# Patient Record
Sex: FEMALE | Race: WHITE | Employment: OTHER | URBAN - METROPOLITAN AREA
[De-identification: names, ages, dates, MRNs, and addresses within clinical notes are randomized per-mention and may not be internally consistent; named-entity substitution may affect disease eponyms.]

---

## 2017-01-02 ENCOUNTER — PATIENT OUTREACH (OUTPATIENT)
Dept: CASE MANAGEMENT | Age: 82
End: 2017-01-02

## 2017-01-02 NOTE — PROGRESS NOTES
Patient identified with a potential need for Chronic Care Management services. Called patient to introduce self and availability of Chronic Care Management services.   Patient informed about the following service elements:  · Health information sharing - c

## 2017-02-06 ENCOUNTER — TELEPHONE (OUTPATIENT)
Dept: FAMILY MEDICINE CLINIC | Facility: CLINIC | Age: 82
End: 2017-02-06

## 2017-02-06 DIAGNOSIS — E88.81 METABOLIC SYNDROME: ICD-10-CM

## 2017-02-06 DIAGNOSIS — R73.03 PREDIABETES: Primary | ICD-10-CM

## 2017-02-06 DIAGNOSIS — I10 HYPERTENSION GOAL BP (BLOOD PRESSURE) < 140/90: ICD-10-CM

## 2017-02-06 DIAGNOSIS — R73.9 HYPERGLYCEMIA: ICD-10-CM

## 2017-02-06 DIAGNOSIS — E78.5 HYPERLIPIDEMIA LDL GOAL <100: ICD-10-CM

## 2017-02-06 NOTE — TELEPHONE ENCOUNTER
Patient is requesting labs be placed at THE [facility], has upcoming appt on 3/29/17 with Dr. Brigitte Batista

## 2017-03-28 ENCOUNTER — TELEPHONE (OUTPATIENT)
Dept: FAMILY MEDICINE CLINIC | Facility: CLINIC | Age: 82
End: 2017-03-28

## 2017-03-28 DIAGNOSIS — Z87.2 HX OF ACTINIC KERATOSIS: Primary | ICD-10-CM

## 2017-03-28 RX ORDER — HYDROCODONE BITARTRATE AND ACETAMINOPHEN 5; 325 MG/1; MG/1
TABLET ORAL
Refills: 0 | COMMUNITY
Start: 2017-03-20 | End: 2017-11-01 | Stop reason: ALTCHOICE

## 2017-03-28 RX ORDER — CLINDAMYCIN HYDROCHLORIDE 150 MG/1
CAPSULE ORAL
Refills: 1 | COMMUNITY
Start: 2017-03-20 | End: 2017-11-01 | Stop reason: ALTCHOICE

## 2017-03-28 RX ORDER — CHLORHEXIDINE GLUCONATE 0.12 MG/ML
RINSE ORAL
Refills: 0 | COMMUNITY
Start: 2017-03-20 | End: 2017-10-02 | Stop reason: ALTCHOICE

## 2017-03-28 NOTE — TELEPHONE ENCOUNTER
Referral Request   Received:  Today       Renan [facility] Clinical Staff Cc: [facility] Referral Pool       Phone Number: [phone]                     .Reason for the order/referral: OV   PCP:  Dr Irwin Art   Refer to Provider (first and last name

## 2017-03-28 NOTE — TELEPHONE ENCOUNTER
Called and talked to [address] Meera Santos Their insurance changed and Dr Juliana Steele is no longer in network but Dr Cristy Mays is in their network

## 2017-03-28 NOTE — TELEPHONE ENCOUNTER
Patient has previously see Dr Gianfranco Harden, why are they requesting to see a Derm from [facility]? Not sure if he is in network for HMO?   Please call patient and check

## 2017-03-29 PROBLEM — Z87.2 HX OF ACTINIC KERATOSIS: Status: ACTIVE | Noted: 2017-03-29

## 2017-04-05 ENCOUNTER — CHARTING TRANS (OUTPATIENT)
Dept: OTHER | Age: 82
End: 2017-04-05

## 2017-04-27 ENCOUNTER — TELEPHONE (OUTPATIENT)
Dept: FAMILY MEDICINE CLINIC | Facility: CLINIC | Age: 82
End: 2017-04-27

## 2017-05-01 ENCOUNTER — APPOINTMENT (OUTPATIENT)
Dept: LAB | Age: 82
End: 2017-05-01
Attending: FAMILY MEDICINE
Payer: MEDICARE

## 2017-05-01 DIAGNOSIS — R73.03 PREDIABETES: ICD-10-CM

## 2017-05-01 DIAGNOSIS — E78.5 HYPERLIPIDEMIA LDL GOAL <100: ICD-10-CM

## 2017-05-01 DIAGNOSIS — E88.81 METABOLIC SYNDROME: ICD-10-CM

## 2017-05-01 DIAGNOSIS — I10 HYPERTENSION GOAL BP (BLOOD PRESSURE) < 140/90: ICD-10-CM

## 2017-05-01 DIAGNOSIS — R73.9 HYPERGLYCEMIA: ICD-10-CM

## 2017-05-01 PROCEDURE — 36415 COLL VENOUS BLD VENIPUNCTURE: CPT

## 2017-05-01 PROCEDURE — 80061 LIPID PANEL: CPT

## 2017-05-01 PROCEDURE — 80053 COMPREHEN METABOLIC PANEL: CPT

## 2017-05-01 PROCEDURE — 83036 HEMOGLOBIN GLYCOSYLATED A1C: CPT

## 2017-05-02 ENCOUNTER — MA CHART PREP (OUTPATIENT)
Dept: FAMILY MEDICINE CLINIC | Facility: CLINIC | Age: 82
End: 2017-05-02

## 2017-05-02 PROBLEM — M47.819 SPINAL ARTHRITIS: Status: ACTIVE | Noted: 2017-05-02

## 2017-05-02 PROBLEM — N18.30 CHRONIC KIDNEY DISEASE, STAGE 3 (HCC): Status: ACTIVE | Noted: 2017-05-02

## 2017-05-02 PROBLEM — I70.0 AORTIC ATHEROSCLEROSIS (HCC): Status: ACTIVE | Noted: 2017-05-02

## 2017-05-02 PROBLEM — I70.0 AORTIC ATHEROSCLEROSIS: Status: ACTIVE | Noted: 2017-05-02

## 2017-05-03 ENCOUNTER — OFFICE VISIT (OUTPATIENT)
Dept: FAMILY MEDICINE CLINIC | Facility: CLINIC | Age: 82
End: 2017-05-03

## 2017-05-03 VITALS
WEIGHT: 161 LBS | HEIGHT: 62.5 IN | RESPIRATION RATE: 12 BRPM | SYSTOLIC BLOOD PRESSURE: 118 MMHG | BODY MASS INDEX: 28.89 KG/M2 | DIASTOLIC BLOOD PRESSURE: 80 MMHG | TEMPERATURE: 97 F | HEART RATE: 66 BPM

## 2017-05-03 DIAGNOSIS — Z00.00 ANNUAL PHYSICAL EXAM: Primary | ICD-10-CM

## 2017-05-03 DIAGNOSIS — I70.0 AORTIC ATHEROSCLEROSIS (HCC): ICD-10-CM

## 2017-05-03 DIAGNOSIS — Z00.00 ENCOUNTER FOR ANNUAL HEALTH EXAMINATION: ICD-10-CM

## 2017-05-03 DIAGNOSIS — E78.5 HYPERLIPIDEMIA LDL GOAL <100: ICD-10-CM

## 2017-05-03 DIAGNOSIS — Z87.2 HX OF ACTINIC KERATOSIS: ICD-10-CM

## 2017-05-03 DIAGNOSIS — R73.03 PREDIABETES: ICD-10-CM

## 2017-05-03 DIAGNOSIS — I10 HYPERTENSION GOAL BP (BLOOD PRESSURE) < 140/90: ICD-10-CM

## 2017-05-03 DIAGNOSIS — Z78.0 POSTMENOPAUSAL: ICD-10-CM

## 2017-05-03 DIAGNOSIS — M47.819 SPINAL ARTHRITIS: ICD-10-CM

## 2017-05-03 DIAGNOSIS — N39.41 URGE INCONTINENCE OF URINE: ICD-10-CM

## 2017-05-03 DIAGNOSIS — N18.30 CHRONIC KIDNEY DISEASE, STAGE 3 (HCC): ICD-10-CM

## 2017-05-03 PROCEDURE — 96160 PT-FOCUSED HLTH RISK ASSMT: CPT | Performed by: FAMILY MEDICINE

## 2017-05-03 PROCEDURE — G0439 PPPS, SUBSEQ VISIT: HCPCS | Performed by: FAMILY MEDICINE

## 2017-05-03 PROCEDURE — 99397 PER PM REEVAL EST PAT 65+ YR: CPT | Performed by: FAMILY MEDICINE

## 2017-05-03 NOTE — PATIENT INSTRUCTIONS
Capsaicin topical cream (Chili peppers) or aspicream    Clary Quinones's SCREENING SCHEDULE   Tests on this list are recommended by your physician but may not be covered, or covered at this frequency, by your insurer.  Please check with your insurance c following criteria:   • Men who are 73-68 years old and have smoked more than 100 cigarettes in their lifetime   • Anyone with a family history    Colorectal Cancer Screening  Covered up to Age 76     Colonoscopy Screen   Covered every 10 years- more often or performed in visit on 09/23/16  -FLU VACC PRSV FREE INC ANTIG    Please get every year    Pneumococcal 13 (Prevnar)  Covered Once after 65 No orders found for this or any previous visit.  Please get once after your 65th birthday    Pneumococcal 23 (Pneum

## 2017-05-03 NOTE — PROGRESS NOTES
HPI:   Charlotte Swanson is a 80year old female who presents for a MA (Medicare Advantage) Supervisit (Once per calendar year).     Doing well, due for dexa, still with back pain, no change with voltaren  Annual Physical due on 09/23/2017        Patient C Transdermal Gel Apply 4 g topically daily as needed. Cholecalciferol (VITAMIN D) 2000 UNITS Oral Cap Take 1 tablet by mouth daily. Aspirin 81 MG Oral Chew Tab Chew 81 mg by mouth daily.    Calcium Carbonate-Vit D-Min (CALCIUM 1200 OR) Take 1 tablet by m the following:    Height as of this encounter: 62.5\". Weight as of this encounter: 161 lb.     Medicare Hearing Assessment  (Required for AWV/SWV)    Hearing Screening    Time taken:  5/3/2017  1:56 PM   Entry User:  Prabha Arn   Screening Method: Normal range of motion. Lymphadenopathy:     She has no cervical adenopathy. She has no axillary adenopathy. Neurological: She is alert and oriented to person, place, and time. She has normal strength and normal reflexes.  No cranial nerve deficit o LOVASTATIN 20 MG Oral Tab                    Musculoskeletal    Spinal arthritis ([facility] [address]    Overview     Seen on multiple Xr spine,  Mild sacroiliac arthritis, worse on the right- T#3 or Norco 5 prn         Current Assessment & Plan     Daily sx, PT in past, in [facility]:    [address] [place] [address] does not have a Power of  for [employer] on file in [facility]  The patient has this document but we do not have it in [facility], and patient is instructed to get our office a copy of it for scanning into Epic          PLAN:  T hazards?: 0-No    Are you on multiple medications?: 0-No    Does pain affect your day to day activities?: 0-No     Have you had any memory issues?: 0-No    Fall/Risk Scorin    Scoring Interpretation: 0 - 3 No Risk     Depression Screening (PHQ-2/PHQ-9) Blood Annually No results found for: FOB No flowsheet data found. Glaucoma Screening      Ophthalmology Visit Annually: Diabetics, FHx Glaucoma, AA>50, > 65 No flowsheet data found.     Bone Density Screening      Dexascan Every two years No resu 05/01/2014 5.9*       No flowsheet data found. Creat/alb ratio  Annually      LDL  Annually LDL CHOLESTEROL (mg/dL)   Date Value   05/01/2017 108     LDL CHOLESTROL (mg/dL)   Date Value   05/01/2014 69    No flowsheet data found.      Dilated Eye exam

## 2017-05-13 DIAGNOSIS — E78.5 HYPERLIPIDEMIA LDL GOAL <100: Primary | ICD-10-CM

## 2017-05-13 RX ORDER — LOVASTATIN 20 MG/1
TABLET ORAL
Qty: 90 TABLET | Refills: 1 | Status: SHIPPED | OUTPATIENT
Start: 2017-05-13 | End: 2017-11-01

## 2017-05-13 NOTE — TELEPHONE ENCOUNTER
Rx request for med Lovastatin 20 mg from [facility]. Last refilled on 11/14/16 # 90 with 1 refill. LOV 5/3/17 with Dr. Brii Leach for medicare supervisit and hyperlipidemia. Last Lipid and CMP done on 5/1/17, Alt = 23.     Future Appointments  Date Time P

## 2017-05-15 ENCOUNTER — APPOINTMENT (OUTPATIENT)
Dept: LAB | Age: 82
End: 2017-05-15
Attending: FAMILY MEDICINE
Payer: MEDICARE

## 2017-05-15 ENCOUNTER — TELEPHONE (OUTPATIENT)
Dept: FAMILY MEDICINE CLINIC | Facility: CLINIC | Age: 82
End: 2017-05-15

## 2017-05-15 DIAGNOSIS — R10.9 RIGHT FLANK PAIN: Primary | ICD-10-CM

## 2017-05-15 DIAGNOSIS — R10.9 RIGHT FLANK PAIN: ICD-10-CM

## 2017-05-15 PROCEDURE — 87086 URINE CULTURE/COLONY COUNT: CPT

## 2017-05-15 NOTE — TELEPHONE ENCOUNTER
Called [facility] to call back has had this it was discussed at last office visit now lower on Rt hand side radiating up back higher than waist awoke during night with this worried about UTI kidney having some frequency has been drinking cranberry juice so no bu

## 2017-05-16 ENCOUNTER — LAB ENCOUNTER (OUTPATIENT)
Dept: LAB | Age: 82
End: 2017-05-16
Attending: FAMILY MEDICINE
Payer: MEDICARE

## 2017-05-16 ENCOUNTER — TELEPHONE (OUTPATIENT)
Dept: FAMILY MEDICINE CLINIC | Facility: CLINIC | Age: 82
End: 2017-05-16

## 2017-05-16 DIAGNOSIS — R10.9 RT FLANK PAIN: Primary | ICD-10-CM

## 2017-05-16 DIAGNOSIS — R10.9 RT FLANK PAIN: ICD-10-CM

## 2017-05-16 PROCEDURE — 87086 URINE CULTURE/COLONY COUNT: CPT

## 2017-05-16 PROCEDURE — 81001 URINALYSIS AUTO W/SCOPE: CPT

## 2017-05-16 NOTE — TELEPHONE ENCOUNTER
Called Lab. Found out that Urine Specimen leaked all out into the bag. They need a new specimen. Called Pt and advised. Pt will go back to complete.

## 2017-05-16 NOTE — TELEPHONE ENCOUNTER
Eddie Lorenz is calling from THE [facility] lab to speak to nurse or Dr. Brad Duran to let either one know why appointment was cancelled.

## 2017-05-18 ENCOUNTER — HOSPITAL ENCOUNTER (OUTPATIENT)
Dept: BONE DENSITY | Age: 82
Discharge: HOME | End: 2017-05-18
Attending: FAMILY MEDICINE
Payer: MEDICARE

## 2017-05-18 DIAGNOSIS — Z78.0 POSTMENOPAUSAL: ICD-10-CM

## 2017-05-18 PROCEDURE — 77080 DXA BONE DENSITY AXIAL: CPT | Performed by: FAMILY MEDICINE

## 2017-05-18 NOTE — PROGRESS NOTES
Quick Note:    Your bone density has improved. Continue your current treatment measures and repeat the Bone Density Dexa Scan in 2-3 years.   ______

## 2017-09-20 DIAGNOSIS — M19.90 ARTHRITIS: ICD-10-CM

## 2017-09-20 NOTE — TELEPHONE ENCOUNTER
Pt requesting refill on Voltaren. LOV 05/03/17 and labs 05/01/17. Will you approve pended refill ? Routed to Dr Nadja Rowell.

## 2017-09-20 NOTE — TELEPHONE ENCOUNTER
Patient calling for a refill of Diclofenac Sodium (VOLTAREN) 1 % Transdermal Gel, would like sent to [place]

## 2017-10-02 ENCOUNTER — OFFICE VISIT (OUTPATIENT)
Dept: FAMILY MEDICINE CLINIC | Facility: CLINIC | Age: 82
End: 2017-10-02

## 2017-10-02 VITALS
HEART RATE: 88 BPM | WEIGHT: 163.81 LBS | RESPIRATION RATE: 16 BRPM | HEIGHT: 63.5 IN | BODY MASS INDEX: 28.66 KG/M2 | SYSTOLIC BLOOD PRESSURE: 148 MMHG | TEMPERATURE: 99 F | DIASTOLIC BLOOD PRESSURE: 78 MMHG

## 2017-10-02 DIAGNOSIS — R35.0 FREQUENCY OF URINATION: Primary | ICD-10-CM

## 2017-10-02 DIAGNOSIS — N30.00 ACUTE CYSTITIS WITHOUT HEMATURIA: ICD-10-CM

## 2017-10-02 PROCEDURE — 87186 SC STD MICRODIL/AGAR DIL: CPT | Performed by: PHYSICIAN ASSISTANT

## 2017-10-02 PROCEDURE — 87088 URINE BACTERIA CULTURE: CPT | Performed by: PHYSICIAN ASSISTANT

## 2017-10-02 PROCEDURE — 87086 URINE CULTURE/COLONY COUNT: CPT | Performed by: PHYSICIAN ASSISTANT

## 2017-10-02 PROCEDURE — 99213 OFFICE O/P EST LOW 20 MIN: CPT | Performed by: PHYSICIAN ASSISTANT

## 2017-10-02 PROCEDURE — 81003 URINALYSIS AUTO W/O SCOPE: CPT | Performed by: PHYSICIAN ASSISTANT

## 2017-10-02 RX ORDER — NITROFURANTOIN 25; 75 MG/1; MG/1
100 CAPSULE ORAL 2 TIMES DAILY
Qty: 14 CAPSULE | Refills: 0 | Status: SHIPPED | OUTPATIENT
Start: 2017-10-02 | End: 2017-10-19 | Stop reason: ALTCHOICE

## 2017-10-02 NOTE — PROGRESS NOTES
CC:  Patient presents with:  UTI: Has been experiencing frequency beginning yesterday. No c/o pain      HPI: Gisell Rashid presents with complaints of increased urinary frequency. Symptoms have been present for 2 days.  She denies nausea, vomiting, abdominal candelaria TS PO Q 4 TO 6 H PRN P Disp:  Rfl: 0   [DISCONTINUED] oxybutynin 5 MG Oral Tablet SR 24 Hr Take 1 Tab by mouth daily. Disp: 90 Tab Rfl: 3     No current facility-administered medications on file prior to visit.      Review of Systems:     Constitutional: No verbalizes understanding. Patient is notified to call with any questions, complications, allergies, or worsening or changing symptoms. Patient is to call with any side effects or complications from the treatments as a result of today.     Reviewed Past Med

## 2017-10-19 ENCOUNTER — OFFICE VISIT (OUTPATIENT)
Dept: FAMILY MEDICINE CLINIC | Facility: CLINIC | Age: 82
End: 2017-10-19

## 2017-10-19 VITALS
DIASTOLIC BLOOD PRESSURE: 60 MMHG | BODY MASS INDEX: 29.07 KG/M2 | HEIGHT: 62.25 IN | RESPIRATION RATE: 10 BRPM | HEART RATE: 60 BPM | SYSTOLIC BLOOD PRESSURE: 116 MMHG | WEIGHT: 160 LBS

## 2017-10-19 DIAGNOSIS — N30.01 ACUTE CYSTITIS WITH HEMATURIA: Primary | ICD-10-CM

## 2017-10-19 DIAGNOSIS — N39.41 URGE INCONTINENCE OF URINE: ICD-10-CM

## 2017-10-19 PROCEDURE — 87186 SC STD MICRODIL/AGAR DIL: CPT | Performed by: FAMILY MEDICINE

## 2017-10-19 PROCEDURE — 99213 OFFICE O/P EST LOW 20 MIN: CPT | Performed by: FAMILY MEDICINE

## 2017-10-19 PROCEDURE — 87088 URINE BACTERIA CULTURE: CPT | Performed by: FAMILY MEDICINE

## 2017-10-19 PROCEDURE — 81003 URINALYSIS AUTO W/O SCOPE: CPT | Performed by: FAMILY MEDICINE

## 2017-10-19 PROCEDURE — 87086 URINE CULTURE/COLONY COUNT: CPT | Performed by: FAMILY MEDICINE

## 2017-10-19 RX ORDER — CIPROFLOXACIN 250 MG/1
250 TABLET, FILM COATED ORAL 2 TIMES DAILY
Qty: 14 TABLET | Refills: 0 | Status: SHIPPED | OUTPATIENT
Start: 2017-10-19 | End: 2017-10-29

## 2017-10-19 NOTE — PROGRESS NOTES
Alek Helena is a 80year old female. HPI:   Patient here on 10/2 with UTI sx, cx showed e coli. Treated with macrobid (culture sensitive). Her sx did improved, but returned after a few days. Having frequency, incontinence.   No fever, chills or h

## 2017-10-26 ENCOUNTER — APPOINTMENT (OUTPATIENT)
Dept: LAB | Age: 82
End: 2017-10-26
Attending: FAMILY MEDICINE
Payer: MEDICARE

## 2017-10-26 DIAGNOSIS — N30.01 ACUTE CYSTITIS WITH HEMATURIA: ICD-10-CM

## 2017-10-26 PROCEDURE — 87086 URINE CULTURE/COLONY COUNT: CPT

## 2017-10-26 PROCEDURE — 36415 COLL VENOUS BLD VENIPUNCTURE: CPT

## 2017-11-01 ENCOUNTER — OFFICE VISIT (OUTPATIENT)
Dept: FAMILY MEDICINE CLINIC | Facility: CLINIC | Age: 82
End: 2017-11-01

## 2017-11-01 VITALS
HEART RATE: 64 BPM | TEMPERATURE: 97 F | BODY MASS INDEX: 29.43 KG/M2 | RESPIRATION RATE: 14 BRPM | WEIGHT: 164 LBS | DIASTOLIC BLOOD PRESSURE: 74 MMHG | SYSTOLIC BLOOD PRESSURE: 142 MMHG | HEIGHT: 62.75 IN

## 2017-11-01 DIAGNOSIS — E78.5 HYPERLIPIDEMIA LDL GOAL <100: ICD-10-CM

## 2017-11-01 DIAGNOSIS — I10 HYPERTENSION GOAL BP (BLOOD PRESSURE) < 140/90: ICD-10-CM

## 2017-11-01 DIAGNOSIS — M47.819 SPINAL ARTHRITIS: ICD-10-CM

## 2017-11-01 DIAGNOSIS — R73.03 PREDIABETES: Primary | ICD-10-CM

## 2017-11-01 DIAGNOSIS — R73.9 HYPERGLYCEMIA: ICD-10-CM

## 2017-11-01 DIAGNOSIS — N18.30 CHRONIC KIDNEY DISEASE, STAGE 3 (HCC): ICD-10-CM

## 2017-11-01 DIAGNOSIS — I70.0 AORTIC ATHEROSCLEROSIS (HCC): ICD-10-CM

## 2017-11-01 PROCEDURE — 99214 OFFICE O/P EST MOD 30 MIN: CPT | Performed by: FAMILY MEDICINE

## 2017-11-01 RX ORDER — LOVASTATIN 20 MG/1
20 TABLET ORAL NIGHTLY
Qty: 90 TABLET | Refills: 3 | Status: SHIPPED | OUTPATIENT
Start: 2017-11-01 | End: 2018-10-28

## 2017-11-02 NOTE — ASSESSMENT & PLAN NOTE
As for her Pre-Diabetes, it is well controlled, no significant medication side effects noted. Recommendations are: continue present meds, lose weight by increased dietary compliance and exercise and will check labs as ordered.       Lab Results  Compone

## 2017-11-02 NOTE — ASSESSMENT & PLAN NOTE
Stable, Continue present management.     Cholesterol Lowering Medications          Lovastatin 20 MG Oral Tab

## 2017-11-02 NOTE — ASSESSMENT & PLAN NOTE
Continue to follow.      Lab Results  Component Value Date/Time   CREATSERUM 0.96 05/01/2017 08:13 AM   GFR 54 (L) 05/01/2017 08:13 AM   GFRNAA 64 05/01/2014 08:00 AM

## 2017-11-06 ENCOUNTER — LAB ENCOUNTER (OUTPATIENT)
Dept: LAB | Age: 82
End: 2017-11-06
Attending: FAMILY MEDICINE
Payer: MEDICARE

## 2017-11-06 DIAGNOSIS — R73.9 HYPERGLYCEMIA: ICD-10-CM

## 2017-11-06 DIAGNOSIS — N18.30 CHRONIC KIDNEY DISEASE, STAGE 3 (HCC): ICD-10-CM

## 2017-11-06 DIAGNOSIS — R73.03 PREDIABETES: ICD-10-CM

## 2017-11-06 PROCEDURE — 80053 COMPREHEN METABOLIC PANEL: CPT

## 2017-11-06 PROCEDURE — 36415 COLL VENOUS BLD VENIPUNCTURE: CPT

## 2017-11-06 PROCEDURE — 83036 HEMOGLOBIN GLYCOSYLATED A1C: CPT

## 2017-11-06 PROCEDURE — 80061 LIPID PANEL: CPT

## 2017-11-06 PROCEDURE — 85025 COMPLETE CBC W/AUTO DIFF WBC: CPT

## 2018-01-15 ENCOUNTER — TELEPHONE (OUTPATIENT)
Dept: FAMILY MEDICINE CLINIC | Facility: CLINIC | Age: 83
End: 2018-01-15

## 2018-01-15 NOTE — TELEPHONE ENCOUNTER
This is a request for the PA in Dr Elvira Valle office. This patient has an active referral valid through Nov 2018 for visit to his office.    Front Staff, Please call the patient and inform her that she has an active referral valid for 6 visit through Nov

## 2018-01-15 NOTE — TELEPHONE ENCOUNTER
[facility]  [facility] Clinical Staff Cc: [facility] Referral Pool   Phone Number: [phone]             .Reason for the order/referral:appt today at 1:40   PCP:  Dr Isaac Jimenez   Refer to Provider (first and last name):Fide Odom   Specialty: Doug

## 2018-01-18 ENCOUNTER — CHARTING TRANS (OUTPATIENT)
Dept: OTHER | Age: 83
End: 2018-01-18

## 2018-01-31 ENCOUNTER — TELEPHONE (OUTPATIENT)
Dept: FAMILY MEDICINE CLINIC | Facility: CLINIC | Age: 83
End: 2018-01-31

## 2018-01-31 DIAGNOSIS — M47.819 SPINAL ARTHRITIS: Primary | ICD-10-CM

## 2018-01-31 DIAGNOSIS — M41.9 SCOLIOSIS OF LUMBAR SPINE, UNSPECIFIED SCOLIOSIS TYPE: ICD-10-CM

## 2018-01-31 DIAGNOSIS — M48.062 SPINAL STENOSIS OF LUMBAR REGION WITH NEUROGENIC CLAUDICATION: ICD-10-CM

## 2018-01-31 NOTE — TELEPHONE ENCOUNTER
A representative from [address] [place] [address] Drive a pain specialist office calling and they are recommend her for shots but now they need a referral for the injections so they are looking for a referral for 6 visits that will cover appts and the injections.  Appt is Febr

## 2018-01-31 NOTE — TELEPHONE ENCOUNTER
Talked to Marquise Santos RN we need the CPT code for the office visit, injection sites (how many sites) and the medication called office and got CPT codes for Evaluate and treat [id number] and lumbar Epidural injection CPT 26853.

## 2018-02-03 NOTE — TELEPHONE ENCOUNTER
Seeing Sherif ramachandran [place], OV 1/31/18  MEDICAL DECISION MAKING:  Impression: Lumbar scoliosis  Lumbar stenosis with claudicatory low back pain  Plan: MRI results were reviewed. Pt is intact on exam. She will f/u with pain service for LESIs.  She is not inter

## 2018-02-07 PROBLEM — M51.369 DDD (DEGENERATIVE DISC DISEASE), LUMBAR: Status: ACTIVE | Noted: 2018-02-07

## 2018-02-07 PROBLEM — M48.062 SPINAL STENOSIS OF LUMBAR REGION WITH NEUROGENIC CLAUDICATION: Status: ACTIVE | Noted: 2018-02-07

## 2018-02-07 PROBLEM — M54.16 LUMBAR RADICULITIS: Status: ACTIVE | Noted: 2018-02-07

## 2018-02-07 PROBLEM — M47.816 LUMBAR SPONDYLOSIS: Status: ACTIVE | Noted: 2018-02-07

## 2018-02-07 PROBLEM — M41.9 SCOLIOSIS OF LUMBAR SPINE, UNSPECIFIED SCOLIOSIS TYPE: Status: ACTIVE | Noted: 2018-02-07

## 2018-02-07 PROBLEM — M51.36 DDD (DEGENERATIVE DISC DISEASE), LUMBAR: Status: ACTIVE | Noted: 2018-02-07

## 2018-04-26 ENCOUNTER — TELEPHONE (OUTPATIENT)
Dept: FAMILY MEDICINE CLINIC | Facility: CLINIC | Age: 83
End: 2018-04-26

## 2018-04-30 PROBLEM — M54.16 LUMBAR RADICULITIS: Status: RESOLVED | Noted: 2018-02-07 | Resolved: 2018-04-30

## 2018-04-30 PROBLEM — M41.9 SCOLIOSIS OF LUMBAR SPINE, UNSPECIFIED SCOLIOSIS TYPE: Status: RESOLVED | Noted: 2018-02-07 | Resolved: 2018-04-30

## 2018-04-30 PROBLEM — Z87.2 HX OF ACTINIC KERATOSIS: Status: RESOLVED | Noted: 2017-03-29 | Resolved: 2018-04-30

## 2018-04-30 PROBLEM — M51.36 DDD (DEGENERATIVE DISC DISEASE), LUMBAR: Status: RESOLVED | Noted: 2018-02-07 | Resolved: 2018-04-30

## 2018-04-30 PROBLEM — M47.816 LUMBAR SPONDYLOSIS: Status: RESOLVED | Noted: 2018-02-07 | Resolved: 2018-04-30

## 2018-04-30 PROBLEM — M51.369 DDD (DEGENERATIVE DISC DISEASE), LUMBAR: Status: RESOLVED | Noted: 2018-02-07 | Resolved: 2018-04-30

## 2018-05-02 ENCOUNTER — OFFICE VISIT (OUTPATIENT)
Dept: FAMILY MEDICINE CLINIC | Facility: CLINIC | Age: 83
End: 2018-05-02

## 2018-05-02 VITALS
HEART RATE: 72 BPM | HEIGHT: 63 IN | DIASTOLIC BLOOD PRESSURE: 80 MMHG | RESPIRATION RATE: 14 BRPM | TEMPERATURE: 98 F | WEIGHT: 161 LBS | SYSTOLIC BLOOD PRESSURE: 128 MMHG | BODY MASS INDEX: 28.53 KG/M2

## 2018-05-02 DIAGNOSIS — M47.819 SPINAL ARTHRITIS: ICD-10-CM

## 2018-05-02 DIAGNOSIS — I10 HYPERTENSION GOAL BP (BLOOD PRESSURE) < 140/90: ICD-10-CM

## 2018-05-02 DIAGNOSIS — E78.5 HYPERLIPIDEMIA LDL GOAL <100: ICD-10-CM

## 2018-05-02 DIAGNOSIS — Z00.00 ANNUAL PHYSICAL EXAM: Primary | ICD-10-CM

## 2018-05-02 DIAGNOSIS — Z13.29 SCREENING FOR THYROID DISORDER: ICD-10-CM

## 2018-05-02 DIAGNOSIS — I70.0 AORTIC ATHEROSCLEROSIS (HCC): ICD-10-CM

## 2018-05-02 DIAGNOSIS — R73.03 PREDIABETES: ICD-10-CM

## 2018-05-02 DIAGNOSIS — N39.41 URGE INCONTINENCE OF URINE: ICD-10-CM

## 2018-05-02 DIAGNOSIS — Z00.00 LABORATORY EXAMINATION ORDERED AS PART OF A ROUTINE GENERAL MEDICAL EXAMINATION: ICD-10-CM

## 2018-05-02 DIAGNOSIS — M48.062 SPINAL STENOSIS OF LUMBAR REGION WITH NEUROGENIC CLAUDICATION: ICD-10-CM

## 2018-05-02 DIAGNOSIS — R73.9 HYPERGLYCEMIA: ICD-10-CM

## 2018-05-02 DIAGNOSIS — Z00.00 ENCOUNTER FOR ANNUAL HEALTH EXAMINATION: ICD-10-CM

## 2018-05-02 DIAGNOSIS — N18.30 CHRONIC KIDNEY DISEASE, STAGE 3 (HCC): ICD-10-CM

## 2018-05-02 PROCEDURE — G0439 PPPS, SUBSEQ VISIT: HCPCS | Performed by: FAMILY MEDICINE

## 2018-05-02 PROCEDURE — 96160 PT-FOCUSED HLTH RISK ASSMT: CPT | Performed by: FAMILY MEDICINE

## 2018-05-02 PROCEDURE — 99397 PER PM REEVAL EST PAT 65+ YR: CPT | Performed by: FAMILY MEDICINE

## 2018-05-02 NOTE — PROGRESS NOTES
HPI:   Alek Ahnr is a 80year old female who presents for a MA (Medicare Advantage) Supervisit (Once per calendar year). Doing well  Annual Physical due on 05/03/2018    dry cough for a long time. Ongoing since February, PND.  And just     Fall/ 22 11/06/2017   ALT 23 11/06/2017   CA 9.4 11/06/2017   ALB 3.8 11/06/2017   CREATSERUM 1.03 (H) 11/06/2017   GLU 98 11/06/2017        CBC  (most recent labs)     Lab Results  Component Value Date   WBC 5.2 11/06/2017   HGB 13.3 11/06/2017   .0 11/0 Negative. Negative for activity change, appetite change, chills and fever. HENT: Negative. Eyes: Negative. Respiratory: Negative. Negative for shortness of breath. Cardiovascular: Negative. Negative for chest pain and palpitations.    Yi Sosa dry. No oropharyngeal exudate. Eyes: Conjunctivae and EOM are normal. Pupils are equal, round, and reactive to light. Neck: Trachea normal, normal range of motion and phonation normal. Neck supple. Normal carotid pulses present. No edema present.  No th 02/26/2014        ASSESSMENT AND OTHER RELEVANT CHRONIC CONDITIONS:   Sparkle Denis is a 80year old female who presents for a Medicare Assessment.      PLAN SUMMARY:   Diagnoses and all orders for this visit:    Annual physical exam    Hypertension go Genitourinary    Chronic kidney disease, stage 3    Overview     GFR 54, due to HTN         Current Assessment & Plan     Stable GFR, continue to follow         Relevant Orders    MICROALB/CREAT RATIO, RANDOM URINE       Other    Urinary incontinence    Ov for quick review of chart, separate sheet to patient  [address] Internal Lab or Procedure External Lab or Procedure   Diabetes Screening      HbgA1C   Annually HGBA1C (%)   Date Value   05/01/2014 5.9 (H)     HgbA1C (% Activity if applicable)     Influenza  Covered Annually 09/29/2017 Please get every year    Pneumococcal 13 (Prevnar)  Covered Once after 65 09/25/2015 Please get once after your 65th birthday    Pneumococcal 23 (Pneumovax)  Covered Once after 65 09/10/200

## 2018-05-02 NOTE — PATIENT INSTRUCTIONS
Zantac 150 2x daily to replace prilosec 20      For cough: Mucinex 600 twice daily to loosen mucous. Take as needed. Store brand guaifenesin is OK as well.      Clary Quinones's SCREENING SCHEDULE   Tests on this list are recommended by your physician b covered service except at the Welcome to Medicare Visit    Abdominal aortic aneurysm screening (once between ages 73-68) IPPE only No results found for this or any previous visit.  Limited to patients who meet one of the following criteria:   • Men who are CHLAMYDIA No flowsheet data found. Screening Mammogram      Mammogram    Recommend Annually to at least age 76, and as needed after 76 There are no preventive care reminders to display for this patient.  Please get this Mammogram regularly   Immunization and print using their home computer and printer. (the forms are also available in [address])  [email or web address] org  This link also has information from the [facility] regarding Advance Directives.

## 2018-10-03 ENCOUNTER — TELEPHONE (OUTPATIENT)
Dept: FAMILY MEDICINE CLINIC | Facility: CLINIC | Age: 83
End: 2018-10-03

## 2018-10-03 DIAGNOSIS — L57.0 ACTINIC KERATOSIS: Primary | ICD-10-CM

## 2018-10-03 NOTE — TELEPHONE ENCOUNTER
Referral request Dr. Edison Peña, Dermatology    Patient seen by Dr. Lidia Clifford M.D. 5/2/2018  Office notes from Dr. Brad Duran 5/2/18  MEDICAL INFORMATION:   She  has a past medical history of Back pain; Back problem; and actinic keratosis (3/29/2017).

## 2018-10-17 ENCOUNTER — CHARTING TRANS (OUTPATIENT)
Dept: OTHER | Age: 83
End: 2018-10-17

## 2018-10-18 ENCOUNTER — APPOINTMENT (OUTPATIENT)
Dept: LAB | Age: 83
End: 2018-10-18
Attending: RADIOLOGY
Payer: MEDICARE

## 2018-10-18 DIAGNOSIS — R73.9 HYPERGLYCEMIA: ICD-10-CM

## 2018-10-18 DIAGNOSIS — E78.5 HYPERLIPIDEMIA LDL GOAL <100: ICD-10-CM

## 2018-10-18 DIAGNOSIS — I10 HYPERTENSION GOAL BP (BLOOD PRESSURE) < 140/90: ICD-10-CM

## 2018-10-18 DIAGNOSIS — N18.30 CHRONIC KIDNEY DISEASE, STAGE 3 (HCC): ICD-10-CM

## 2018-10-18 DIAGNOSIS — Z13.29 SCREENING FOR THYROID DISORDER: ICD-10-CM

## 2018-10-18 DIAGNOSIS — R73.03 PREDIABETES: ICD-10-CM

## 2018-10-18 PROCEDURE — 83036 HEMOGLOBIN GLYCOSYLATED A1C: CPT

## 2018-10-18 PROCEDURE — 84443 ASSAY THYROID STIM HORMONE: CPT

## 2018-10-18 PROCEDURE — 82043 UR ALBUMIN QUANTITATIVE: CPT

## 2018-10-18 PROCEDURE — 82570 ASSAY OF URINE CREATININE: CPT

## 2018-10-18 PROCEDURE — 80061 LIPID PANEL: CPT

## 2018-10-18 PROCEDURE — 80053 COMPREHEN METABOLIC PANEL: CPT

## 2018-10-28 DIAGNOSIS — E78.5 HYPERLIPIDEMIA LDL GOAL <100: ICD-10-CM

## 2018-10-30 RX ORDER — LOVASTATIN 20 MG/1
TABLET ORAL
Qty: 90 TABLET | Refills: 2 | Status: SHIPPED | OUTPATIENT
Start: 2018-10-30 | End: 2019-05-01

## 2018-11-01 ENCOUNTER — OFFICE VISIT (OUTPATIENT)
Dept: FAMILY MEDICINE CLINIC | Facility: CLINIC | Age: 83
End: 2018-11-01
Payer: COMMERCIAL

## 2018-11-01 VITALS
WEIGHT: 163 LBS | TEMPERATURE: 97 F | HEART RATE: 70 BPM | DIASTOLIC BLOOD PRESSURE: 64 MMHG | RESPIRATION RATE: 16 BRPM | BODY MASS INDEX: 28.53 KG/M2 | HEIGHT: 63.5 IN | SYSTOLIC BLOOD PRESSURE: 120 MMHG

## 2018-11-01 DIAGNOSIS — N18.30 CHRONIC KIDNEY DISEASE, STAGE 3 (HCC): ICD-10-CM

## 2018-11-01 DIAGNOSIS — I10 HYPERTENSION GOAL BP (BLOOD PRESSURE) < 140/90: Primary | ICD-10-CM

## 2018-11-01 DIAGNOSIS — E78.5 HYPERLIPIDEMIA LDL GOAL <100: ICD-10-CM

## 2018-11-01 DIAGNOSIS — R73.03 PREDIABETES: ICD-10-CM

## 2018-11-01 DIAGNOSIS — I70.0 AORTIC ATHEROSCLEROSIS (HCC): ICD-10-CM

## 2018-11-01 PROCEDURE — 99214 OFFICE O/P EST MOD 30 MIN: CPT | Performed by: FAMILY MEDICINE

## 2018-11-01 NOTE — ASSESSMENT & PLAN NOTE
As for her Pre-Diabetes, it is well controlled, no significant medication side effects noted. Recommendations are: continue present meds, lose weight by increased dietary compliance and exercise and will check labs as ordered.     Lab Results   Componen

## 2018-11-01 NOTE — PROGRESS NOTES
HPI:   Sparkle Denis is a 80year old female who presents for recheck of her Pre-diabetes. We have been following this elevated Blood sugars and patieint is doing very well.     Patient presents with:  Blood Sugar: 6 month prediabetes f/u  Chest Conges Cardiovascular: Normal rate, regular rhythm, S1 normal, S2 normal and normal heart sounds. No murmur heard. Pulses:       Posterior tibial pulses are 2+ on the right side, and 2+ on the left side. Edema not present.   Pulmonary/Chest: Effort normal 10/18/2018 08:03 AM                  Other    Prediabetes    Overview     A1c 6.0% 10/2014         Current Assessment & Plan     As for her Pre-Diabetes, it is well controlled, no significant medication side effects noted.      Recommendations are: continue

## 2018-11-01 NOTE — ASSESSMENT & PLAN NOTE
Stable, annual labs just done .   Lab Results   Component Value Date/Time    CREATSERUM 0.89 10/18/2018 08:03 AM    GFR 49 (L) 11/06/2017 08:23 AM    GFRNAA 58 (L) 10/18/2018 08:03 AM

## 2019-01-17 ENCOUNTER — OFFICE VISIT (OUTPATIENT)
Dept: DERMATOLOGY | Age: 84
End: 2019-01-17

## 2019-01-17 DIAGNOSIS — D48.5 NEOPLASM OF UNCERTAIN BEHAVIOR OF SKIN: Primary | ICD-10-CM

## 2019-01-17 PROCEDURE — 11310 SHAVE SKIN LESION 0.5 CM/<: CPT | Performed by: DERMATOLOGY

## 2019-01-17 PROCEDURE — 99214 OFFICE O/P EST MOD 30 MIN: CPT | Performed by: DERMATOLOGY

## 2019-01-17 RX ORDER — NIACIN 500 MG
500 TABLET ORAL
COMMUNITY

## 2019-01-17 RX ORDER — ASPIRIN 81 MG/1
81 TABLET, CHEWABLE ORAL
COMMUNITY

## 2019-01-17 RX ORDER — OMEPRAZOLE 20 MG/1
20 CAPSULE, DELAYED RELEASE ORAL
COMMUNITY

## 2019-01-17 RX ORDER — LOVASTATIN 20 MG/1
TABLET ORAL
COMMUNITY
Start: 2018-10-30

## 2019-01-17 RX ORDER — CHLORAL HYDRATE 500 MG
1 CAPSULE ORAL
COMMUNITY

## 2019-01-22 LAB — PATH REPORT PLASRBC-IMP: NORMAL

## 2019-02-17 RX ORDER — MULTIVIT-MIN/IRON/FOLIC ACID/K 18-600-40
CAPSULE ORAL
COMMUNITY

## 2019-04-02 ENCOUNTER — TELEPHONE (OUTPATIENT)
Dept: FAMILY MEDICINE CLINIC | Facility: CLINIC | Age: 84
End: 2019-04-02

## 2019-04-02 DIAGNOSIS — L82.1 SEBORRHEIC KERATOSES: Primary | ICD-10-CM

## 2019-04-02 NOTE — TELEPHONE ENCOUNTER
Referral request Dr. Bay Solis M.D.  Dermatology    Patient had seen Dr. Bay Solis M.D. 10/17/18 with a history of inflamed/irritating seborrheic keratoses

## 2019-04-03 ENCOUNTER — OFFICE VISIT (OUTPATIENT)
Dept: DERMATOLOGY | Age: 84
End: 2019-04-03

## 2019-04-03 DIAGNOSIS — L57.0 ACTINIC KERATOSES: Primary | ICD-10-CM

## 2019-04-03 PROCEDURE — 99213 OFFICE O/P EST LOW 20 MIN: CPT | Performed by: DERMATOLOGY

## 2019-04-03 PROCEDURE — 17000 DESTRUCT PREMALG LESION: CPT | Performed by: DERMATOLOGY

## 2019-04-25 ENCOUNTER — TELEPHONE (OUTPATIENT)
Dept: FAMILY MEDICINE CLINIC | Facility: CLINIC | Age: 84
End: 2019-04-25

## 2019-04-27 ENCOUNTER — MA CHART PREP (OUTPATIENT)
Dept: FAMILY MEDICINE CLINIC | Facility: CLINIC | Age: 84
End: 2019-04-27

## 2019-05-01 ENCOUNTER — OFFICE VISIT (OUTPATIENT)
Dept: FAMILY MEDICINE CLINIC | Facility: CLINIC | Age: 84
End: 2019-05-01
Payer: COMMERCIAL

## 2019-05-01 VITALS
TEMPERATURE: 98 F | SYSTOLIC BLOOD PRESSURE: 112 MMHG | DIASTOLIC BLOOD PRESSURE: 60 MMHG | HEIGHT: 63 IN | HEART RATE: 72 BPM | WEIGHT: 156 LBS | RESPIRATION RATE: 14 BRPM | BODY MASS INDEX: 27.64 KG/M2

## 2019-05-01 DIAGNOSIS — M47.819 SPINAL ARTHRITIS: ICD-10-CM

## 2019-05-01 DIAGNOSIS — N18.30 CHRONIC KIDNEY DISEASE, STAGE 3 (HCC): ICD-10-CM

## 2019-05-01 DIAGNOSIS — M48.062 SPINAL STENOSIS OF LUMBAR REGION WITH NEUROGENIC CLAUDICATION: ICD-10-CM

## 2019-05-01 DIAGNOSIS — E78.5 HYPERLIPIDEMIA LDL GOAL <100: ICD-10-CM

## 2019-05-01 DIAGNOSIS — N39.41 URGE INCONTINENCE OF URINE: ICD-10-CM

## 2019-05-01 DIAGNOSIS — Z00.00 ANNUAL PHYSICAL EXAM: Primary | ICD-10-CM

## 2019-05-01 DIAGNOSIS — R73.9 HYPERGLYCEMIA: ICD-10-CM

## 2019-05-01 DIAGNOSIS — Z00.00 ENCOUNTER FOR ANNUAL HEALTH EXAMINATION: ICD-10-CM

## 2019-05-01 DIAGNOSIS — I10 ESSENTIAL HYPERTENSION: ICD-10-CM

## 2019-05-01 DIAGNOSIS — R73.03 PREDIABETES: ICD-10-CM

## 2019-05-01 DIAGNOSIS — Z00.00 LABORATORY EXAMINATION ORDERED AS PART OF A ROUTINE GENERAL MEDICAL EXAMINATION: ICD-10-CM

## 2019-05-01 DIAGNOSIS — I70.0 AORTIC ATHEROSCLEROSIS (HCC): ICD-10-CM

## 2019-05-01 PROCEDURE — 96160 PT-FOCUSED HLTH RISK ASSMT: CPT | Performed by: FAMILY MEDICINE

## 2019-05-01 PROCEDURE — G0439 PPPS, SUBSEQ VISIT: HCPCS | Performed by: FAMILY MEDICINE

## 2019-05-01 PROCEDURE — 99397 PER PM REEVAL EST PAT 65+ YR: CPT | Performed by: FAMILY MEDICINE

## 2019-05-01 RX ORDER — LOVASTATIN 20 MG/1
20 TABLET ORAL NIGHTLY
Qty: 90 TABLET | Refills: 4 | Status: SHIPPED | OUTPATIENT
Start: 2019-05-01 | End: 2020-05-11

## 2019-05-01 NOTE — PROGRESS NOTES
HPI:   Paty Mcgarry is a 80year old female who presents for a MA (Medicare Advantage) Supervisit (Once per calendar year). Doing well, rizwana eleft knee pain with ROM, more stress from 's GNB surgery .  Going to [facility] [place] next month  Kennedy Sullivan Component Value Date    AST 19 10/18/2018    ALT 19 10/18/2018    CA 9.6 10/18/2018    ALB 3.8 10/18/2018    TSH 3.290 10/18/2018    CREATSERUM 0.89 10/18/2018    GLU 94 10/18/2018        CBC  (most recent labs)   Lab Results   Component Value Date    WB 112/60   Pulse 72   Temp 97.8 °F (36.6 °C) (Oral)   Resp 14   Ht 63\"   Wt 156 lb   BMI 27.63 kg/m²  Estimated body mass index is 27.63 kg/m² as calculated from the following:    Height as of this encounter: 63\". Weight as of this encounter: 156 lb. There is no tenderness. There is no rebound and no guarding. No hernia. Musculoskeletal: Normal range of motion. Right knee: Normal.        Left knee: She exhibits swelling. She exhibits normal range of motion and no effusion.    Lymphadenopathy: Overview     Lovastatin 20, niacin 500         Current Assessment & Plan     Stable, Continue present management.     Cholesterol Lowering Medications          LOVASTATIN 20 MG Oral Tab                 Relevant Medications    Lovastatin 20 MG Oral Tab    Ao are well controlled, no significant medication side effects noted.    PLAN: will continue present medications, reviewed diet, exercise and weight control, and labs as ordered                 Other Visit Diagnoses     Annual physical exam    -  Primary    En preventive care reminders to display for this patient. Update Health Maintenance if applicable    Flex Sigmoidoscopy Screen every 10 years No results found for this or any previous visit. No flowsheet data found.      Fecal Occult Blood Annually No results cover unless Medically needed    Zoster   Not covered by Medicare Part B No vaccine history found This may be covered with your pharmacy  prescription benefits                    Template: [address] [[address]]

## 2019-05-01 NOTE — PATIENT INSTRUCTIONS
Clary Quinones's SCREENING SCHEDULE   Tests on this list are recommended by your physician but may not be covered, or covered at this frequency, by your insurer. Please check with your insurance carrier before scheduling to verify coverage.    PREVENT patients who meet one of the following criteria:   • Men who are 73-68 years old and have smoked more than 100 cigarettes in their lifetime   • Anyone with a family history    Colorectal Cancer Screening  Covered up to Age 76     Colonoscopy Screen   Cover this patient.  Please get this Mammogram regularly   Immunizations      Influenza  Covered Annually Orders placed or performed in visit on 09/23/16   • FLU VACC PRSV FREE INC ANTIG    Please get every year    Pneumococcal 13 (Prevnar)  Covered Once after 65 regarding Advance Directives.

## 2019-07-15 DIAGNOSIS — E78.5 HYPERLIPIDEMIA LDL GOAL <100: ICD-10-CM

## 2019-07-16 RX ORDER — LOVASTATIN 20 MG/1
TABLET ORAL
Qty: 90 TABLET | Refills: 1 | Status: SHIPPED | OUTPATIENT
Start: 2019-07-16 | End: 2019-11-06

## 2019-07-26 ENCOUNTER — TELEPHONE (OUTPATIENT)
Dept: FAMILY MEDICINE CLINIC | Facility: CLINIC | Age: 84
End: 2019-07-26

## 2019-07-26 NOTE — TELEPHONE ENCOUNTER
Called patient and they are using [facility] now no longer with [facility] called and informed [facility] of this change

## 2019-07-26 NOTE — TELEPHONE ENCOUNTER
[facility] called checking status of refill for the LOVASTATIN 20 MG Oral Tab, states did not received the refill from 07/16 asked if can resend?

## 2019-08-07 ENCOUNTER — TELEPHONE (OUTPATIENT)
Dept: FAMILY MEDICINE CLINIC | Facility: CLINIC | Age: 84
End: 2019-08-07

## 2019-08-07 DIAGNOSIS — R73.03 PREDIABETES: Primary | ICD-10-CM

## 2019-08-07 DIAGNOSIS — Z98.49 HISTORY OF CATARACT EXTRACTION, UNSPECIFIED LATERALITY: ICD-10-CM

## 2019-08-07 NOTE — TELEPHONE ENCOUNTER
Referral request Dr. Lu Glover M.D.,Ophthalmology    Patient seen by Lis Trinh M.D. 5/1/19  Office notes from Dr. Lis Trinh M.D. 5/1/19  cataract surgery, complex (2005);

## 2019-10-07 ENCOUNTER — OFFICE VISIT (OUTPATIENT)
Dept: FAMILY MEDICINE CLINIC | Facility: CLINIC | Age: 84
End: 2019-10-07
Payer: COMMERCIAL

## 2019-10-07 VITALS
WEIGHT: 159.38 LBS | TEMPERATURE: 97 F | HEART RATE: 65 BPM | BODY MASS INDEX: 28.59 KG/M2 | SYSTOLIC BLOOD PRESSURE: 140 MMHG | DIASTOLIC BLOOD PRESSURE: 68 MMHG | HEIGHT: 62.5 IN | RESPIRATION RATE: 18 BRPM

## 2019-10-07 DIAGNOSIS — R35.1 NOCTURIA MORE THAN TWICE PER NIGHT: ICD-10-CM

## 2019-10-07 DIAGNOSIS — R35.89 POLYURIA: ICD-10-CM

## 2019-10-07 DIAGNOSIS — R05.9 COUGH: Primary | ICD-10-CM

## 2019-10-07 DIAGNOSIS — J01.00 ACUTE NON-RECURRENT MAXILLARY SINUSITIS: ICD-10-CM

## 2019-10-07 PROCEDURE — 99213 OFFICE O/P EST LOW 20 MIN: CPT | Performed by: FAMILY MEDICINE

## 2019-10-07 RX ORDER — BENZONATATE 200 MG/1
200 CAPSULE ORAL 3 TIMES DAILY PRN
Qty: 30 CAPSULE | Refills: 0 | Status: SHIPPED | OUTPATIENT
Start: 2019-10-07 | End: 2019-11-06 | Stop reason: ALTCHOICE

## 2019-10-07 RX ORDER — AZITHROMYCIN 250 MG/1
TABLET, FILM COATED ORAL
Qty: 6 TABLET | Refills: 0 | Status: SHIPPED | OUTPATIENT
Start: 2019-10-07 | End: 2019-11-06 | Stop reason: ALTCHOICE

## 2019-10-07 NOTE — PROGRESS NOTES
Roger Hernandez is a 80year old female. S:  Patient presents today with the following concerns:  · Began as a cold. Cough is productive especially in the morning. Yellow mucous. Less as day goes on. Occasionally feels dyspnea. No fevers/chills. any unusual skin lesions  EYES:denies vision change  LUNGS: denies shortness of breath with exertion  CARDIOVASCULAR: denies chest pain on exertion  GI: denies abdominal pain.   No N/V/D/C  : denies dysuria  MUSCULOSKELETAL: denies back pain  NEURO: denie

## 2019-10-11 ENCOUNTER — TELEPHONE (OUTPATIENT)
Dept: FAMILY MEDICINE CLINIC | Facility: CLINIC | Age: 84
End: 2019-10-11

## 2019-10-11 DIAGNOSIS — L82.1 OTHER SEBORRHEIC KERATOSIS: Primary | ICD-10-CM

## 2019-10-11 NOTE — TELEPHONE ENCOUNTER
Referral request Dr. Amando Fall M.D.,Dermatology    Office notes from Dr. Cassy Owens M.D. 5/1/2019  Rizwana Hampton (DERMATOLOGY) Actinic keratoses

## 2019-10-23 ENCOUNTER — OFFICE VISIT (OUTPATIENT)
Dept: DERMATOLOGY | Age: 84
End: 2019-10-23

## 2019-10-23 DIAGNOSIS — L57.0 ACTINIC KERATOSES: Primary | ICD-10-CM

## 2019-10-23 PROCEDURE — 99213 OFFICE O/P EST LOW 20 MIN: CPT | Performed by: DERMATOLOGY

## 2019-10-23 PROCEDURE — 17000 DESTRUCT PREMALG LESION: CPT | Performed by: DERMATOLOGY

## 2019-10-28 ENCOUNTER — APPOINTMENT (OUTPATIENT)
Dept: LAB | Age: 84
End: 2019-10-28
Attending: FAMILY MEDICINE
Payer: MEDICARE

## 2019-10-28 DIAGNOSIS — R73.9 HYPERGLYCEMIA: ICD-10-CM

## 2019-10-28 DIAGNOSIS — Z00.00 LABORATORY EXAMINATION ORDERED AS PART OF A ROUTINE GENERAL MEDICAL EXAMINATION: ICD-10-CM

## 2019-10-28 PROCEDURE — 83036 HEMOGLOBIN GLYCOSYLATED A1C: CPT

## 2019-10-28 PROCEDURE — 85027 COMPLETE CBC AUTOMATED: CPT

## 2019-10-28 PROCEDURE — 80053 COMPREHEN METABOLIC PANEL: CPT

## 2019-10-28 PROCEDURE — 80061 LIPID PANEL: CPT

## 2019-10-28 PROCEDURE — 84443 ASSAY THYROID STIM HORMONE: CPT

## 2019-11-06 ENCOUNTER — OFFICE VISIT (OUTPATIENT)
Dept: FAMILY MEDICINE CLINIC | Facility: CLINIC | Age: 84
End: 2019-11-06
Payer: COMMERCIAL

## 2019-11-06 VITALS
SYSTOLIC BLOOD PRESSURE: 130 MMHG | RESPIRATION RATE: 12 BRPM | DIASTOLIC BLOOD PRESSURE: 72 MMHG | HEART RATE: 60 BPM | HEIGHT: 62 IN | WEIGHT: 158 LBS | TEMPERATURE: 99 F | BODY MASS INDEX: 29.08 KG/M2

## 2019-11-06 DIAGNOSIS — N18.30 CHRONIC KIDNEY DISEASE, STAGE 3 (HCC): ICD-10-CM

## 2019-11-06 DIAGNOSIS — I10 ESSENTIAL HYPERTENSION: Primary | ICD-10-CM

## 2019-11-06 DIAGNOSIS — R73.03 PREDIABETES: ICD-10-CM

## 2019-11-06 DIAGNOSIS — M19.90 ARTHRITIS: ICD-10-CM

## 2019-11-06 DIAGNOSIS — M47.816 ARTHRITIS OF FACET JOINT OF LUMBAR SPINE: ICD-10-CM

## 2019-11-06 DIAGNOSIS — E78.2 MIXED HYPERLIPIDEMIA: ICD-10-CM

## 2019-11-06 PROCEDURE — 99214 OFFICE O/P EST MOD 30 MIN: CPT | Performed by: FAMILY MEDICINE

## 2019-11-06 NOTE — ASSESSMENT & PLAN NOTE
Lab Results   Component Value Date/Time    CREATSERUM 1.08 (H) 10/28/2019 08:10 AM    GFR 49 (L) 11/06/2017 08:23 AM    GFRNAA 46 (L) 10/28/2019 08:10 AM

## 2019-11-06 NOTE — PROGRESS NOTES
HPI:   Carol Hopkins is a 80year old female who presents for recheck of her Pre-diabetes. We have been following this elevated Blood sugars and patieint is doing well overall. .  Back pain persistent, limiting cleaning, no real help with injections. Normocephalic. Neck: Normal range of motion. Cardiovascular: Normal rate, regular rhythm, S1 normal, S2 normal and normal heart sounds. No murmur heard. Pulses:       Posterior tibial pulses are 2+ on the right side and 2+ on the left side.     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  Now voltaren Gel

## 2020-01-06 ENCOUNTER — OFFICE VISIT (OUTPATIENT)
Dept: FAMILY MEDICINE CLINIC | Facility: CLINIC | Age: 85
End: 2020-01-06
Payer: COMMERCIAL

## 2020-01-06 VITALS
DIASTOLIC BLOOD PRESSURE: 70 MMHG | RESPIRATION RATE: 12 BRPM | WEIGHT: 160 LBS | HEART RATE: 60 BPM | HEIGHT: 62.5 IN | BODY MASS INDEX: 28.71 KG/M2 | TEMPERATURE: 97 F | SYSTOLIC BLOOD PRESSURE: 136 MMHG

## 2020-01-06 DIAGNOSIS — I70.0 AORTIC ATHEROSCLEROSIS (HCC): ICD-10-CM

## 2020-01-06 DIAGNOSIS — I10 ESSENTIAL HYPERTENSION: Primary | ICD-10-CM

## 2020-01-06 DIAGNOSIS — N18.30 CHRONIC KIDNEY DISEASE, STAGE 3 (HCC): ICD-10-CM

## 2020-01-06 DIAGNOSIS — M47.816 ARTHRITIS OF FACET JOINT OF LUMBAR SPINE: ICD-10-CM

## 2020-01-06 PROCEDURE — 99213 OFFICE O/P EST LOW 20 MIN: CPT | Performed by: FAMILY MEDICINE

## 2020-01-06 RX ORDER — LISINOPRIL 10 MG/1
10 TABLET ORAL DAILY
Qty: 90 TABLET | Refills: 3 | Status: SHIPPED | OUTPATIENT
Start: 2020-01-06 | End: 2020-10-27

## 2020-01-06 NOTE — PROGRESS NOTES
Patient presents with:  Blood Pressure: has been elevated for the past few days       Subjective   HPI:   This is a 80year old female coming in for elevated BP and light headed feeling.  Using no current meds, but lisinopril in past, stopped when lower lev demonstration of an abdominal aortic aneurysm. \" lovastatin 20         Current Assessment & Plan     Stable function. Continue present management          Essential hypertension - Primary    Overview     lisinoril 20 until 6/2015, then off with /80.

## 2020-01-30 ENCOUNTER — TELEPHONE (OUTPATIENT)
Dept: FAMILY MEDICINE CLINIC | Facility: CLINIC | Age: 85
End: 2020-01-30

## 2020-02-04 NOTE — ASSESSMENT & PLAN NOTE
Lab Results   Component Value Date/Time    CREATSERUM 1.08 (H) 10/28/2019 08:10 AM    GFR 49 (L) 11/06/2017 08:23 AM    GFRNAA 46 (L) 10/28/2019 08:10 AM      grasdulally worsening, continue to follow

## 2020-02-05 ENCOUNTER — OFFICE VISIT (OUTPATIENT)
Dept: FAMILY MEDICINE CLINIC | Facility: CLINIC | Age: 85
End: 2020-02-05
Payer: COMMERCIAL

## 2020-02-05 VITALS
RESPIRATION RATE: 18 BRPM | SYSTOLIC BLOOD PRESSURE: 128 MMHG | BODY MASS INDEX: 28.71 KG/M2 | HEIGHT: 62.5 IN | HEART RATE: 67 BPM | DIASTOLIC BLOOD PRESSURE: 70 MMHG | TEMPERATURE: 97 F | WEIGHT: 160 LBS

## 2020-02-05 DIAGNOSIS — R73.03 PREDIABETES: ICD-10-CM

## 2020-02-05 DIAGNOSIS — E78.2 MIXED HYPERLIPIDEMIA: ICD-10-CM

## 2020-02-05 DIAGNOSIS — N39.41 URGE INCONTINENCE OF URINE: ICD-10-CM

## 2020-02-05 DIAGNOSIS — N18.30 CHRONIC KIDNEY DISEASE, STAGE 3 (HCC): ICD-10-CM

## 2020-02-05 DIAGNOSIS — M48.062 SPINAL STENOSIS OF LUMBAR REGION WITH NEUROGENIC CLAUDICATION: ICD-10-CM

## 2020-02-05 DIAGNOSIS — M47.816 ARTHRITIS OF FACET JOINT OF LUMBAR SPINE: ICD-10-CM

## 2020-02-05 DIAGNOSIS — I70.0 AORTIC ATHEROSCLEROSIS (HCC): ICD-10-CM

## 2020-02-05 DIAGNOSIS — Z00.00 ENCOUNTER FOR ANNUAL HEALTH EXAMINATION: ICD-10-CM

## 2020-02-05 DIAGNOSIS — Z00.00 ANNUAL PHYSICAL EXAM: Primary | ICD-10-CM

## 2020-02-05 DIAGNOSIS — I10 ESSENTIAL HYPERTENSION: ICD-10-CM

## 2020-02-05 PROCEDURE — G0439 PPPS, SUBSEQ VISIT: HCPCS | Performed by: FAMILY MEDICINE

## 2020-02-05 PROCEDURE — 96160 PT-FOCUSED HLTH RISK ASSMT: CPT | Performed by: FAMILY MEDICINE

## 2020-02-05 PROCEDURE — 99397 PER PM REEVAL EST PAT 65+ YR: CPT | Performed by: FAMILY MEDICINE

## 2020-02-05 NOTE — PATIENT INSTRUCTIONS
Clary Quinones's SCREENING SCHEDULE   Tests on this list are recommended by your physician but may not be covered, or covered at this frequency, by your insurer. Please check with your insurance carrier before scheduling to verify coverage.    PREVENT Limited to patients who meet one of the following criteria:   • Men who are 73-68 years old and have smoked more than 100 cigarettes in their lifetime   • Anyone with a family history    Colorectal Cancer Screening  Covered up to Age 76     Colonoscopy Scr display for this patient.  Please get this Mammogram regularly   Immunizations      Influenza  Covered Annually Orders placed or performed in visit on 09/23/16   • FLU VACC PRSV FREE INC ANTIG    Please get every year    Pneumococcal 13 (Prevnar)  Covered O Association regarding Advance Directives.

## 2020-02-05 NOTE — PROGRESS NOTES
HPI:   Sparkle Denis is a 80year old female who presents for a MA (Medicare Advantage) Supervisit (Once per calendar year). 2-3 weeks ago had one night of some SOB.    Annual Physical due on 05/01/2020        Fall/Risk Assessment   She has been scr Chemistry Labs:   Lab Results   Component Value Date    AST 22 10/28/2019    ALT 21 10/28/2019    CA 9.3 10/28/2019    ALB 3.8 10/28/2019    TSH 3.470 10/28/2019    CREATSERUM 1.08 (H) 10/28/2019     (H) 10/28/2019        CBC  (most recent labs)   L abdominal pain. Endocrine: Negative for polydipsia, polyphagia and polyuria. Skin: Negative for rash. Allergic/Immunologic: Negative for immunocompromised state. Neurological: Negative for weakness and numbness.    Hematological: Negative for adenop bruit not present. Pulmonary/Chest: Effort normal and breath sounds normal. No respiratory distress. She has no decreased breath sounds. She has no wheezes. She has no rales. She exhibits no tenderness. Abdominal: Soft.  Bowel sounds are normal. She exhi Aortic atherosclerosis ([address]    Overview     Seen on 2011 L spine Xray, \"Arterial calcifications are present. No direct demonstration of an abdominal aortic aneurysm. \" lovastatin 20         Current Assessment & Plan     Stable in statin.  Continue pres it is well controlled, no significant medication side effects noted. Recommendations are: continue present meds, lose weight by increased dietary compliance and exercise and will check labs as ordered.     Lab Results   Component Value Date    A1C 5.9 ( Value   05/01/2014 69        EKG - w/ Initial Preventative Physical Exam only, or if medically necessary Electrocardiogram date     Colorectal Cancer Screening      Colonoscopy Screen every 10 years There are no preventive care reminders to display for [facility] abusers     Tetanus Toxoid  Only covered with a cut with metal- TD and TDaP Not covered by Medicare Part B) No vaccine history found This may be covered with your prescription benefits, but Medicare does not cover unless Medically needed    Zoster   Not co

## 2020-04-02 ENCOUNTER — TELEPHONE (OUTPATIENT)
Dept: DERMATOLOGY | Age: 85
End: 2020-04-02

## 2020-04-07 ENCOUNTER — APPOINTMENT (OUTPATIENT)
Dept: DERMATOLOGY | Age: 85
End: 2020-04-07

## 2020-04-14 ENCOUNTER — TELEPHONE (OUTPATIENT)
Dept: UROLOGY | Facility: HOSPITAL | Age: 85
End: 2020-04-14

## 2020-04-14 NOTE — TELEPHONE ENCOUNTER
Given circumstances surrounding COVID-19 recommend this visit be conducted as a video visit with pt's consent.   Pt agrees  Edda Vallecillo

## 2020-04-21 ENCOUNTER — VIRTUAL PHONE E/M (OUTPATIENT)
Dept: UROLOGY | Facility: HOSPITAL | Age: 85
End: 2020-04-21
Attending: OBSTETRICS & GYNECOLOGY
Payer: MEDICARE

## 2020-04-21 ENCOUNTER — TELEPHONE (OUTPATIENT)
Dept: UROLOGY | Facility: HOSPITAL | Age: 85
End: 2020-04-21

## 2020-04-21 VITALS — BODY MASS INDEX: 27.63 KG/M2 | WEIGHT: 154 LBS | HEIGHT: 62.5 IN

## 2020-04-21 DIAGNOSIS — N39.41 URGE INCONTINENCE: ICD-10-CM

## 2020-04-21 DIAGNOSIS — R35.1 NOCTURIA: Primary | ICD-10-CM

## 2020-04-21 DIAGNOSIS — R39.15 URINARY URGENCY: ICD-10-CM

## 2020-04-21 DIAGNOSIS — R35.0 URINARY FREQUENCY: ICD-10-CM

## 2020-04-21 RX ORDER — POLYETHYLENE GLYCOL 3350 17 G/17G
17 POWDER, FOR SOLUTION ORAL DAILY
COMMUNITY

## 2020-04-21 RX ORDER — TROSPIUM CHLORIDE ER 60 MG/1
60 CAPSULE ORAL DAILY
Qty: 90 CAPSULE | Refills: 3 | Status: SHIPPED | OUTPATIENT
Start: 2020-04-21 | End: 2020-05-06 | Stop reason: RX

## 2020-04-21 NOTE — PATIENT INSTRUCTIONS
[facility]    TIMED VOIDING    For women experiencing urinary incontinence from overactive bladder or pelvic weakness, retraining the bladder is of great importance.   The bladder responds very well to this te suck on a hard candy or take small sips of water if you become thirsty instead of drinking large amounts. Diuretic medications such as high blood pressure medicine should be taken during the daytime if possible.     When you are able to hold your urine wit

## 2020-04-21 NOTE — PROGRESS NOTES
Pastor Hwang, DO  4/21/2020     Referred by Dr. Ayden De Los Santos  Given circumstances surrounding COVID-19 this visit is being conducted as a video visit with pt's consent. History collected via telephone  Video portion via doxy. me/khurram (pt unable to conne Allergies:    Latex                   RASH  Penicillins             RASH  Polysporin [Bacitra*    RASH  Adhesive Tape           RASH    Medications:  PEG 3350 Oral Powd Pack, Take 17 g by mouth daily. , Disp: , Rfl:   lisinopril 10 MG Oral Tab, Take 1 t Discussed dietary & weight management with potential improvements in symptoms with weight loss.     Diagnostic Items:  ucx with s/sx of UTI    Medications Discussed:  OAB meds    Treatment Plan, Non-surgical:   RN teaching/pt education done    Treatment Kandice

## 2020-04-27 ENCOUNTER — TELEPHONE (OUTPATIENT)
Dept: UROLOGY | Facility: HOSPITAL | Age: 85
End: 2020-04-27

## 2020-04-27 DIAGNOSIS — N39.41 URGE INCONTINENCE: Primary | ICD-10-CM

## 2020-04-27 RX ORDER — TROSPIUM CHLORIDE ER 60 MG/1
60 CAPSULE ORAL DAILY
Qty: 30 CAPSULE | Refills: 3 | Status: SHIPPED | OUTPATIENT
Start: 2020-04-27 | End: 2020-05-06 | Stop reason: RX

## 2020-04-27 NOTE — TELEPHONE ENCOUNTER
Patient  Cynthia Bridges called today requesting that the Trospium Chloride er 60 mg be sent to [facility]. Which order sent per his request. Patient's  verbalized understanding.

## 2020-04-30 ENCOUNTER — TELEPHONE (OUTPATIENT)
Dept: UROLOGY | Facility: HOSPITAL | Age: 85
End: 2020-04-30

## 2020-04-30 DIAGNOSIS — N39.41 URGE INCONTINENCE: Primary | ICD-10-CM

## 2020-04-30 RX ORDER — TROSPIUM CHLORIDE ER 60 MG/1
60 CAPSULE ORAL DAILY
Qty: 30 CAPSULE | Refills: 3 | Status: SHIPPED | OUTPATIENT
Start: 2020-04-30 | End: 2020-05-06

## 2020-04-30 NOTE — TELEPHONE ENCOUNTER
Spoke with patients  Jo Duran) he is requesting that we send his wife's prescription for Trospium Chloride ER 60 mg to [facility] pharmacy. He has called and verified he would be able to receive this prescription for less money per month at [facility] vs [facility].

## 2020-05-04 ENCOUNTER — TELEPHONE (OUTPATIENT)
Dept: UROLOGY | Facility: HOSPITAL | Age: 85
End: 2020-05-04

## 2020-05-04 NOTE — TELEPHONE ENCOUNTER
calling to check status of Trospium sent to [facility] mail order pharmacy  Trospium Chloride ER 60 MG Oral Capsule SR 24 Hr 30 capsule 3 4/30/2020     Sig - Route:  Take 1 capsule (60 mg total) by mouth daily. - Oral    Sent to pharmacy as: Trospium

## 2020-05-06 ENCOUNTER — TELEPHONE (OUTPATIENT)
Dept: UROLOGY | Facility: HOSPITAL | Age: 85
End: 2020-05-06

## 2020-05-06 DIAGNOSIS — N39.41 URGE INCONTINENCE: ICD-10-CM

## 2020-05-06 RX ORDER — TROSPIUM CHLORIDE ER 60 MG/1
60 CAPSULE ORAL DAILY
Qty: 90 CAPSULE | Refills: 2 | Status: SHIPPED | OUTPATIENT
Start: 2020-05-06 | End: 2020-05-11

## 2020-05-06 RX ORDER — TROSPIUM CHLORIDE ER 60 MG/1
60 CAPSULE ORAL DAILY
Qty: 30 CAPSULE | Refills: 3 | Status: SHIPPED | OUTPATIENT
Start: 2020-05-06 | End: 2020-05-06 | Stop reason: RX

## 2020-05-06 NOTE — TELEPHONE ENCOUNTER
called, still having difficulty getting Trospium from [facility], called Tianna at [phone] per Tawny Hylton, pt [facility] local [facility] to get RX 90 day supply for $123.21, called , [facility] [place], [place]

## 2020-05-11 ENCOUNTER — TELEPHONE (OUTPATIENT)
Dept: UROLOGY | Facility: HOSPITAL | Age: 85
End: 2020-05-11

## 2020-05-11 DIAGNOSIS — E78.5 HYPERLIPIDEMIA LDL GOAL <100: ICD-10-CM

## 2020-05-11 RX ORDER — LOVASTATIN 20 MG/1
20 TABLET ORAL NIGHTLY
Qty: 90 TABLET | Refills: 0 | Status: SHIPPED | OUTPATIENT
Start: 2020-05-11 | End: 2020-06-15

## 2020-05-11 RX ORDER — LOVASTATIN 20 MG/1
20 TABLET ORAL NIGHTLY
Qty: 90 TABLET | Refills: 4 | Status: CANCELLED | OUTPATIENT
Start: 2020-05-11

## 2020-05-11 RX ORDER — TROSPIUM CHLORIDE ER 60 MG/1
60 CAPSULE ORAL DAILY
Qty: 60 CAPSULE | Refills: 3 | Status: SHIPPED | OUTPATIENT
Start: 2020-05-11 | End: 2020-05-11

## 2020-05-11 RX ORDER — TROSPIUM CHLORIDE ER 60 MG/1
60 CAPSULE ORAL DAILY
Qty: 60 CAPSULE | Refills: 3 | Status: SHIPPED | OUTPATIENT
Start: 2020-05-11 | End: 2020-08-25

## 2020-05-11 NOTE — TELEPHONE ENCOUNTER
Requested Prescriptions     Pending Prescriptions Disp Refills   • Lovastatin 20 MG Oral Tab 90 tablet 4     Sig: Take 1 tablet (20 mg total) by mouth nightly.      LOV 2/5/2020     Patient was asked to follow-up in: 6 months    Appointment scheduled: 8/5/2

## 2020-05-11 NOTE — TELEPHONE ENCOUNTER
Received call from pt's  regarding Trospium prescription. Trospium is currently on back order from mail order [facility] #[id number]). [facility] retail pharmacy in [facility] has 60 day supply.  Script changed to Trospium 60mg daily #60 with 3 refi

## 2020-05-18 ENCOUNTER — TELEPHONE (OUTPATIENT)
Dept: UROLOGY | Facility: HOSPITAL | Age: 85
End: 2020-05-18

## 2020-05-18 DIAGNOSIS — R35.0 URINARY FREQUENCY: Primary | ICD-10-CM

## 2020-05-18 RX ORDER — NITROFURANTOIN 25; 75 MG/1; MG/1
100 CAPSULE ORAL 2 TIMES DAILY
Qty: 14 CAPSULE | Refills: 0 | Status: SHIPPED | OUTPATIENT
Start: 2020-05-18 | End: 2020-08-05

## 2020-05-18 NOTE — TELEPHONE ENCOUNTER
Patient called today stating she \"had a bad night.' She was up every hour peeing through out the night. \" She reports burning once while voiding. Denies fever at this time.  Patient states she feels uncomfortably dropping of a urine culture at this time, D

## 2020-05-28 ENCOUNTER — TELEPHONE (OUTPATIENT)
Dept: UROLOGY | Facility: HOSPITAL | Age: 85
End: 2020-05-28

## 2020-05-28 NOTE — TELEPHONE ENCOUNTER
Pt called to report she completed empiric antibiotics and feels better. She received patient education materials from Dr. Junior Francisco recent telephone visit. Answered all questions about the materials, pt verbalizes understanding.   She reports also getti

## 2020-06-08 ENCOUNTER — TELEPHONE (OUTPATIENT)
Dept: UROLOGY | Facility: HOSPITAL | Age: 85
End: 2020-06-08

## 2020-06-13 DIAGNOSIS — E78.5 HYPERLIPIDEMIA LDL GOAL <100: ICD-10-CM

## 2020-06-15 RX ORDER — LOVASTATIN 20 MG/1
TABLET ORAL
Qty: 90 TABLET | Refills: 0 | Status: SHIPPED | OUTPATIENT
Start: 2020-06-15 | End: 2020-09-08

## 2020-06-15 NOTE — TELEPHONE ENCOUNTER
Requested Prescriptions     Pending Prescriptions Disp Refills   • LOVASTATIN 20 MG Oral Tab [Pharmacy Med Name: LOVASTATIN  20MG  TAB] 90 tablet 0     Sig: TAKE 1 TABLET BY MOUTH IN  THE EVENING     LOV 2/5/2020       Appointment scheduled: 8/5/2020 Allie Arevalo,

## 2020-06-22 ENCOUNTER — TELEPHONE (OUTPATIENT)
Dept: FAMILY MEDICINE CLINIC | Facility: CLINIC | Age: 85
End: 2020-06-22

## 2020-06-22 NOTE — TELEPHONE ENCOUNTER
Patient's spouse called would like to know if patient''s lisinopril 10mg can be transfer to [facility]. Patient was using local pharmacy for refills but would like to switch to mail order. If any questions please contact patient.

## 2020-06-22 NOTE — TELEPHONE ENCOUNTER
Advised Pt to have script transferred to [facility].  Pt has 3 (90) refills at [facility] and will transfer to [facility]

## 2020-07-29 ENCOUNTER — TELEPHONE (OUTPATIENT)
Dept: FAMILY MEDICINE CLINIC | Facility: CLINIC | Age: 85
End: 2020-07-29

## 2020-07-29 DIAGNOSIS — Z00.00 LABORATORY EXAMINATION ORDERED AS PART OF A ROUTINE GENERAL MEDICAL EXAMINATION: ICD-10-CM

## 2020-07-29 DIAGNOSIS — E78.2 MIXED HYPERLIPIDEMIA: Primary | ICD-10-CM

## 2020-07-29 DIAGNOSIS — N18.30 CHRONIC KIDNEY DISEASE, STAGE 3 (HCC): ICD-10-CM

## 2020-07-29 DIAGNOSIS — R73.03 PREDIABETES: ICD-10-CM

## 2020-07-29 DIAGNOSIS — R73.9 HYPERGLYCEMIA: ICD-10-CM

## 2020-07-29 NOTE — TELEPHONE ENCOUNTER
Patient is scheduled to see Dr. René Stokes 8/5 for follow up and would like to know if blood work is needed prior to visit. Pt scheduled lab appointment for tomorrow in case lab work was needed. Please contact Spouse.  Thanks

## 2020-07-29 NOTE — TELEPHONE ENCOUNTER
Griselda Nailer from the [place] office asking if blood work will be needed as pt has an appt tomorrow morning

## 2020-07-30 ENCOUNTER — APPOINTMENT (OUTPATIENT)
Dept: LAB | Age: 85
End: 2020-07-30
Attending: FAMILY MEDICINE
Payer: MEDICARE

## 2020-07-30 DIAGNOSIS — E78.2 MIXED HYPERLIPIDEMIA: ICD-10-CM

## 2020-07-30 DIAGNOSIS — N18.30 CHRONIC KIDNEY DISEASE, STAGE 3 (HCC): ICD-10-CM

## 2020-07-30 DIAGNOSIS — Z00.00 LABORATORY EXAMINATION ORDERED AS PART OF A ROUTINE GENERAL MEDICAL EXAMINATION: ICD-10-CM

## 2020-07-30 DIAGNOSIS — R73.03 PREDIABETES: ICD-10-CM

## 2020-07-30 DIAGNOSIS — R73.9 HYPERGLYCEMIA: ICD-10-CM

## 2020-07-30 LAB
ALBUMIN SERPL-MCNC: 3.5 G/DL (ref 3.4–5)
ALBUMIN/GLOB SERPL: 0.9 {RATIO} (ref 1–2)
ALP LIVER SERPL-CCNC: 123 U/L (ref 55–142)
ALT SERPL-CCNC: 16 U/L (ref 13–56)
ANION GAP SERPL CALC-SCNC: 2 MMOL/L (ref 0–18)
AST SERPL-CCNC: 18 U/L (ref 15–37)
BILIRUB SERPL-MCNC: 0.4 MG/DL (ref 0.1–2)
BUN BLD-MCNC: 32 MG/DL (ref 7–18)
BUN/CREAT SERPL: 18.2 (ref 10–20)
CALCIUM BLD-MCNC: 9.7 MG/DL (ref 8.5–10.1)
CHLORIDE SERPL-SCNC: 108 MMOL/L (ref 98–112)
CHOLEST SMN-MCNC: 152 MG/DL (ref ?–200)
CO2 SERPL-SCNC: 28 MMOL/L (ref 21–32)
CREAT BLD-MCNC: 1.76 MG/DL (ref 0.55–1.02)
DEPRECATED RDW RBC AUTO: 41.1 FL (ref 35.1–46.3)
ERYTHROCYTE [DISTWIDTH] IN BLOOD BY AUTOMATED COUNT: 11.4 % (ref 11–15)
EST. AVERAGE GLUCOSE BLD GHB EST-MCNC: 120 MG/DL (ref 68–126)
GLOBULIN PLAS-MCNC: 3.7 G/DL (ref 2.8–4.4)
GLUCOSE BLD-MCNC: 105 MG/DL (ref 70–99)
HBA1C MFR BLD HPLC: 5.8 % (ref ?–5.7)
HCT VFR BLD AUTO: 38.4 % (ref 35–48)
HDLC SERPL-MCNC: 31 MG/DL (ref 40–59)
HGB BLD-MCNC: 12.4 G/DL (ref 12–16)
LDLC SERPL CALC-MCNC: 86 MG/DL (ref ?–100)
M PROTEIN MFR SERPL ELPH: 7.2 G/DL (ref 6.4–8.2)
MCH RBC QN AUTO: 31.5 PG (ref 26–34)
MCHC RBC AUTO-ENTMCNC: 32.3 G/DL (ref 31–37)
MCV RBC AUTO: 97.5 FL (ref 80–100)
NONHDLC SERPL-MCNC: 121 MG/DL (ref ?–130)
OSMOLALITY SERPL CALC.SUM OF ELEC: 293 MOSM/KG (ref 275–295)
PATIENT FASTING Y/N/NP: YES
PATIENT FASTING Y/N/NP: YES
PLATELET # BLD AUTO: 252 10(3)UL (ref 150–450)
POTASSIUM SERPL-SCNC: 5.2 MMOL/L (ref 3.5–5.1)
RBC # BLD AUTO: 3.94 X10(6)UL (ref 3.8–5.3)
SODIUM SERPL-SCNC: 138 MMOL/L (ref 136–145)
TRIGL SERPL-MCNC: 173 MG/DL (ref 30–149)
VLDLC SERPL CALC-MCNC: 35 MG/DL (ref 0–30)
WBC # BLD AUTO: 6.7 X10(3) UL (ref 4–11)

## 2020-07-30 PROCEDURE — 80061 LIPID PANEL: CPT

## 2020-07-30 PROCEDURE — 83036 HEMOGLOBIN GLYCOSYLATED A1C: CPT

## 2020-07-30 PROCEDURE — 85027 COMPLETE CBC AUTOMATED: CPT

## 2020-07-30 PROCEDURE — 80053 COMPREHEN METABOLIC PANEL: CPT

## 2020-07-30 NOTE — TELEPHONE ENCOUNTER
Diagnoses and all orders for this visit:    Mixed hyperlipidemia  -     COMP METABOLIC PANEL (14); Future  -     LIPID PANEL; Future    Chronic kidney disease, stage 3 (HCC)  -     COMP METABOLIC PANEL (14);  Future  -     CBC, PLATELET; NO DIFFERENTIAL; Fu

## 2020-08-03 PROBLEM — N18.4 CHRONIC KIDNEY DISEASE, STAGE 4 (SEVERE) (HCC): Status: ACTIVE | Noted: 2017-05-02

## 2020-08-05 ENCOUNTER — OFFICE VISIT (OUTPATIENT)
Dept: DERMATOLOGY | Age: 85
End: 2020-08-05

## 2020-08-05 ENCOUNTER — OFFICE VISIT (OUTPATIENT)
Dept: FAMILY MEDICINE CLINIC | Facility: CLINIC | Age: 85
End: 2020-08-05
Payer: COMMERCIAL

## 2020-08-05 ENCOUNTER — TELEPHONE (OUTPATIENT)
Dept: FAMILY MEDICINE CLINIC | Facility: CLINIC | Age: 85
End: 2020-08-05

## 2020-08-05 VITALS
DIASTOLIC BLOOD PRESSURE: 68 MMHG | TEMPERATURE: 97 F | HEART RATE: 66 BPM | WEIGHT: 154.19 LBS | RESPIRATION RATE: 17 BRPM | HEIGHT: 62.5 IN | SYSTOLIC BLOOD PRESSURE: 144 MMHG | BODY MASS INDEX: 27.67 KG/M2

## 2020-08-05 DIAGNOSIS — Z00.00 LABORATORY EXAMINATION ORDERED AS PART OF A ROUTINE GENERAL MEDICAL EXAMINATION: ICD-10-CM

## 2020-08-05 DIAGNOSIS — E78.2 MIXED HYPERLIPIDEMIA: ICD-10-CM

## 2020-08-05 DIAGNOSIS — I10 ESSENTIAL HYPERTENSION: Primary | ICD-10-CM

## 2020-08-05 DIAGNOSIS — N18.4 CHRONIC KIDNEY DISEASE, STAGE 4 (SEVERE) (HCC): ICD-10-CM

## 2020-08-05 DIAGNOSIS — R73.03 PREDIABETES: ICD-10-CM

## 2020-08-05 DIAGNOSIS — E03.8 SUBCLINICAL HYPOTHYROIDISM: ICD-10-CM

## 2020-08-05 DIAGNOSIS — I70.0 AORTIC ATHEROSCLEROSIS (HCC): ICD-10-CM

## 2020-08-05 DIAGNOSIS — L57.0 ACTINIC KERATOSES: Primary | ICD-10-CM

## 2020-08-05 DIAGNOSIS — R73.9 HYPERGLYCEMIA: ICD-10-CM

## 2020-08-05 PROCEDURE — 3078F DIAST BP <80 MM HG: CPT | Performed by: FAMILY MEDICINE

## 2020-08-05 PROCEDURE — 99213 OFFICE O/P EST LOW 20 MIN: CPT | Performed by: DERMATOLOGY

## 2020-08-05 PROCEDURE — 3008F BODY MASS INDEX DOCD: CPT | Performed by: FAMILY MEDICINE

## 2020-08-05 PROCEDURE — 99214 OFFICE O/P EST MOD 30 MIN: CPT | Performed by: FAMILY MEDICINE

## 2020-08-05 PROCEDURE — 3077F SYST BP >= 140 MM HG: CPT | Performed by: FAMILY MEDICINE

## 2020-08-05 PROCEDURE — 17000 DESTRUCT PREMALG LESION: CPT | Performed by: DERMATOLOGY

## 2020-08-05 NOTE — TELEPHONE ENCOUNTER
Please enter lab orders for the patient's upcoming physical appointment. Physical scheduled:    Your appointments     Date & Time Appointment Department [facility])    Feb 10, 2021  9:30 AM LACHELLE CALVO Supervisit with Carito Lebron MD [address] [place] [address] Claudene Justin

## 2020-08-05 NOTE — PROGRESS NOTES
HPI:   Patient presents with: Follow - Up: 6 month. Post Nasal Drip: \"has been going on forever\"    Subjective   Peter Sanchez is a 80year old female who presents for recheck of her hypertension.  She has been taking medications as instructed, wi well-nourished. No distress. HENT:   Head: Normocephalic. Neck: Normal range of motion. Neck supple. Cardiovascular: Normal rate, regular rhythm, S1 normal, S2 normal and normal heart sounds. No murmur heard.   Pulses:       Posterior tibial pulses 07/30/2020    A1C 5.9 (H) 10/28/2019                       Labs in 2-4 weeks. If GFR still low, will get renal eval  Return in about 6 months (around 2/5/2021).     Pinky Underwood, 8/5/2020  10:46 AM

## 2020-08-10 ENCOUNTER — APPOINTMENT (OUTPATIENT)
Dept: LAB | Age: 85
End: 2020-08-10
Attending: FAMILY MEDICINE
Payer: MEDICARE

## 2020-08-10 ENCOUNTER — TELEPHONE (OUTPATIENT)
Dept: FAMILY MEDICINE CLINIC | Facility: CLINIC | Age: 85
End: 2020-08-10

## 2020-08-10 DIAGNOSIS — R35.0 URINARY FREQUENCY: Primary | ICD-10-CM

## 2020-08-10 DIAGNOSIS — N18.4 CHRONIC KIDNEY DISEASE, STAGE 4 (SEVERE) (HCC): ICD-10-CM

## 2020-08-10 LAB
CREAT BLD-MCNC: 1.78 MG/DL (ref 0.55–1.02)
POTASSIUM SERPL-SCNC: 5 MMOL/L (ref 3.5–5.1)

## 2020-08-10 PROCEDURE — 82565 ASSAY OF CREATININE: CPT

## 2020-08-10 PROCEDURE — 84132 ASSAY OF SERUM POTASSIUM: CPT

## 2020-08-10 NOTE — TELEPHONE ENCOUNTER
Referral request Dr. Devorah Mazariegos DO  Urogynecology    Patient had virtual visit with Dr. Ryanne Maria 4/21/2020    Office notes from Dr. Rich Sommer DO 4/21/2020  Diagnostic Items:  ucx with s/sx of UTI     Medications Discussed:  OAB meds     Tr

## 2020-08-11 ENCOUNTER — TELEPHONE (OUTPATIENT)
Dept: FAMILY MEDICINE CLINIC | Facility: CLINIC | Age: 85
End: 2020-08-11

## 2020-08-11 DIAGNOSIS — N17.9 AKI (ACUTE KIDNEY INJURY) (HCC): Primary | ICD-10-CM

## 2020-08-11 NOTE — PROGRESS NOTES
Still increased creating, will get   Diagnoses and all orders for this visit:    MARY (acute kidney injury) ([facility] [address]  -     CREATININE, SERUM; Future  -     POTASSIUM; Future  -     CREATININE, 24-HOUR URINE;  Future     renal if elevalted

## 2020-08-11 NOTE — TELEPHONE ENCOUNTER
Patient notified she will need to  24 hour urine container from lab and will then collect urine into container every time she goes for 24 hours. She verbalized understanding and agrees with plan.

## 2020-08-14 ENCOUNTER — APPOINTMENT (OUTPATIENT)
Dept: LAB | Age: 85
End: 2020-08-14
Attending: FAMILY MEDICINE
Payer: MEDICARE

## 2020-08-14 DIAGNOSIS — N17.9 AKI (ACUTE KIDNEY INJURY) (HCC): ICD-10-CM

## 2020-08-14 LAB
CREAT BLD-MCNC: 1.88 MG/DL (ref 0.55–1.02)
CREAT UR-SCNC: 0.78 G/24 HR (ref 0.6–1.8)
POTASSIUM SERPL-SCNC: 4.5 MMOL/L (ref 3.5–5.1)
SPECIMEN VOL UR: 1500 ML

## 2020-08-14 PROCEDURE — 82565 ASSAY OF CREATININE: CPT

## 2020-08-14 PROCEDURE — 82570 ASSAY OF URINE CREATININE: CPT

## 2020-08-14 PROCEDURE — 84132 ASSAY OF SERUM POTASSIUM: CPT

## 2020-08-17 ENCOUNTER — TELEPHONE (OUTPATIENT)
Dept: FAMILY MEDICINE CLINIC | Facility: CLINIC | Age: 85
End: 2020-08-17

## 2020-08-17 NOTE — TELEPHONE ENCOUNTER
Per Referral Department Dr. Edwin Ramesh is no longer in her [address] plan. Asked patient to return my call.

## 2020-08-17 NOTE — TELEPHONE ENCOUNTER
[facility] Marcel[facility] Collins M[facility] Chaparro Vallecillo no longer takes this insurance. I called her insurance and was given the name of Dr. Marilu Stern M.D. in [facility]. He is a urologist.   Can I refer patient to him instead?

## 2020-08-18 NOTE — TELEPHONE ENCOUNTER
Do we have a link I could look at.  UROLOGY is NOT approriate, urogynecology is a specific specialty

## 2020-08-18 NOTE — TELEPHONE ENCOUNTER
Dr. Toyin Quiroz again this morning and since they do not have an in network urogynecologist they are letting me put in for an out of network exception.    Kaleb Malone know that I think they are going to let her go see this specialist

## 2020-08-20 ENCOUNTER — TELEPHONE (OUTPATIENT)
Dept: FAMILY MEDICINE CLINIC | Facility: CLINIC | Age: 85
End: 2020-08-20

## 2020-08-20 NOTE — TELEPHONE ENCOUNTER
Patient notified I have spoken to the insurance again today, referral is still pending for Olimpia Gonzalez M.D. Patient voiced understanding.   I will let her know when I hear back from [facility]

## 2020-08-20 NOTE — TELEPHONE ENCOUNTER
Patient has problem with kidnies and she would like to get a diet that will help sent 2 diet plans for kidney dysfunction

## 2020-08-22 ENCOUNTER — TELEPHONE (OUTPATIENT)
Dept: FAMILY MEDICINE CLINIC | Facility: CLINIC | Age: 85
End: 2020-08-22

## 2020-08-22 NOTE — TELEPHONE ENCOUNTER
Received medical records request from [address] [place] [address] requesting last office notes and last 3 lab results.  All records in [address] [place] [address] Rd, printed and faxed to [address] [address]

## 2020-08-25 ENCOUNTER — TELEPHONE (OUTPATIENT)
Dept: UROLOGY | Facility: HOSPITAL | Age: 85
End: 2020-08-25

## 2020-08-25 RX ORDER — TROSPIUM CHLORIDE ER 60 MG/1
60 CAPSULE ORAL DAILY
Qty: 90 CAPSULE | Refills: 0 | Status: SHIPPED | OUTPATIENT
Start: 2020-08-25 | End: 2020-09-15

## 2020-08-25 NOTE — TELEPHONE ENCOUNTER
patient left a message asking if she could have a cheaper medication other than Trospium, her insurance will cover Vesicare and Ditropan at a cost savings, discussed with patient 's recommendation for Trospium as it does not cause memory loss as some of

## 2020-09-01 NOTE — TELEPHONE ENCOUNTER
Called [facility] today to speak with representative Sravani Snell asked them to return my call.

## 2020-09-02 ENCOUNTER — OFFICE VISIT (OUTPATIENT)
Dept: FAMILY MEDICINE CLINIC | Facility: CLINIC | Age: 85
End: 2020-09-02
Payer: COMMERCIAL

## 2020-09-02 VITALS
TEMPERATURE: 98 F | HEIGHT: 62.5 IN | RESPIRATION RATE: 17 BRPM | HEART RATE: 97 BPM | DIASTOLIC BLOOD PRESSURE: 60 MMHG | BODY MASS INDEX: 27.02 KG/M2 | SYSTOLIC BLOOD PRESSURE: 116 MMHG | WEIGHT: 150.63 LBS

## 2020-09-02 DIAGNOSIS — N17.9 AKI (ACUTE KIDNEY INJURY) (HCC): ICD-10-CM

## 2020-09-02 DIAGNOSIS — N18.4 CHRONIC KIDNEY DISEASE, STAGE 4 (SEVERE) (HCC): Primary | ICD-10-CM

## 2020-09-02 PROCEDURE — 99213 OFFICE O/P EST LOW 20 MIN: CPT | Performed by: FAMILY MEDICINE

## 2020-09-02 PROCEDURE — 3074F SYST BP LT 130 MM HG: CPT | Performed by: FAMILY MEDICINE

## 2020-09-02 PROCEDURE — 3008F BODY MASS INDEX DOCD: CPT | Performed by: FAMILY MEDICINE

## 2020-09-02 PROCEDURE — 3078F DIAST BP <80 MM HG: CPT | Performed by: FAMILY MEDICINE

## 2020-09-02 RX ORDER — OMEPRAZOLE 20 MG/1
20 CAPSULE, DELAYED RELEASE ORAL
COMMUNITY
End: 2020-10-27

## 2020-09-02 NOTE — PROGRESS NOTES
Patient presents with:  Test Results: on Kidney test       Subjective   HPI:   This is a 80year old female coming in for MARY, increased creatini, and elevated urone 24 urine, feeling tired, low BP recently , rare use of PPI since Dx this last months.    de She has a normal mood and affect.  Her speech is normal and behavior is normal. Judgment and thought content normal. Cognition and memory are normal.            Assessment and Plan:     Problem List Items Addressed This Visit        Genitourinary    Chronic

## 2020-09-03 NOTE — TELEPHONE ENCOUNTER
Patient notified that Dr. Jayson Shrestha is out of network. Patient voiced understanding and has discussed this with Dr. René Stokes M.D. as well.

## 2020-09-06 DIAGNOSIS — E78.5 HYPERLIPIDEMIA LDL GOAL <100: ICD-10-CM

## 2020-09-08 ENCOUNTER — LAB ENCOUNTER (OUTPATIENT)
Dept: LAB | Age: 85
End: 2020-09-08
Attending: FAMILY MEDICINE
Payer: MEDICARE

## 2020-09-08 DIAGNOSIS — N18.4 CHRONIC KIDNEY DISEASE, STAGE 4 (SEVERE) (HCC): ICD-10-CM

## 2020-09-08 DIAGNOSIS — N17.9 AKI (ACUTE KIDNEY INJURY) (HCC): ICD-10-CM

## 2020-09-08 LAB
CREAT BLD-MCNC: 1.91 MG/DL (ref 0.55–1.02)
POTASSIUM SERPL-SCNC: 4.3 MMOL/L (ref 3.5–5.1)

## 2020-09-08 PROCEDURE — 82565 ASSAY OF CREATININE: CPT

## 2020-09-08 PROCEDURE — 84132 ASSAY OF SERUM POTASSIUM: CPT

## 2020-09-08 RX ORDER — LOVASTATIN 20 MG/1
TABLET ORAL
Qty: 90 TABLET | Refills: 0 | Status: SHIPPED | OUTPATIENT
Start: 2020-09-08 | End: 2020-11-30

## 2020-09-08 NOTE — TELEPHONE ENCOUNTER
Requested Prescriptions     Pending Prescriptions Disp Refills   • LOVASTATIN 20 MG Oral Tab [Pharmacy Med Name: LOVASTATIN  20MG  TAB] 90 tablet 3     Sig: TAKE 1 TABLET BY MOUTH IN  THE EVENING     LOV 9/2/2020         Appointment scheduled: 2/10/2021 Demond

## 2020-09-09 ENCOUNTER — MED REC SCAN ONLY (OUTPATIENT)
Dept: FAMILY MEDICINE CLINIC | Facility: CLINIC | Age: 85
End: 2020-09-09

## 2020-09-15 RX ORDER — TROSPIUM CHLORIDE ER 60 MG/1
60 CAPSULE ORAL DAILY
Qty: 90 CAPSULE | Refills: 3 | Status: SHIPPED | OUTPATIENT
Start: 2020-09-15

## 2020-09-15 RX ORDER — TROSPIUM CHLORIDE ER 60 MG/1
60 CAPSULE ORAL DAILY
Qty: 90 CAPSULE | Refills: 0 | Status: SHIPPED | OUTPATIENT
Start: 2020-09-15 | End: 2020-09-15

## 2020-10-27 ENCOUNTER — OFFICE VISIT (OUTPATIENT)
Dept: NEPHROLOGY | Facility: CLINIC | Age: 85
End: 2020-10-27
Payer: COMMERCIAL

## 2020-10-27 VITALS — SYSTOLIC BLOOD PRESSURE: 144 MMHG | DIASTOLIC BLOOD PRESSURE: 82 MMHG | BODY MASS INDEX: 27 KG/M2 | WEIGHT: 149.38 LBS

## 2020-10-27 DIAGNOSIS — N17.9 AKI (ACUTE KIDNEY INJURY) (HCC): Primary | ICD-10-CM

## 2020-10-27 DIAGNOSIS — R63.4 WEIGHT LOSS: ICD-10-CM

## 2020-10-27 DIAGNOSIS — N18.30 STAGE 3 CHRONIC KIDNEY DISEASE, UNSPECIFIED WHETHER STAGE 3A OR 3B CKD (HCC): ICD-10-CM

## 2020-10-27 PROCEDURE — 3077F SYST BP >= 140 MM HG: CPT | Performed by: INTERNAL MEDICINE

## 2020-10-27 PROCEDURE — 99204 OFFICE O/P NEW MOD 45 MIN: CPT | Performed by: INTERNAL MEDICINE

## 2020-10-27 PROCEDURE — 3079F DIAST BP 80-89 MM HG: CPT | Performed by: INTERNAL MEDICINE

## 2020-10-27 NOTE — PROGRESS NOTES
Nephrology Consult Note    REASON FOR CONSULT: MARY / CKD 3    ASSESSMENT/PLAN:        1) MARY / CKD 3- recently higher serum creatinine of 1.7 to 1.8 mg/dL noted in July above her baseline of 1.0 mg/dL 10/19 may be due to urinary retention given history of or hepatic disease    ROS:    Denies fever/chills  Denies wt loss/gain  Denies HA or visual changes  Denies CP or palpitations  Denies SOB/cough/hemoptysis  Denies abd or flank pain  Denies N/V/D  Denies change in urinary habits or gross hematuria  Denies RASH  Polysporin [facility]*    RASH  Adhesive Tape           RASH    Social History:  Social History    Socioeconomic History      Marital status:       Spouse name: Not on file      Number of children: Not on file      Years of education: Not on ike

## 2020-11-11 ENCOUNTER — LAB ENCOUNTER (OUTPATIENT)
Dept: LAB | Age: 85
End: 2020-11-11
Attending: PEDIATRICS
Payer: MEDICARE

## 2020-11-11 ENCOUNTER — APPOINTMENT (OUTPATIENT)
Dept: LAB | Age: 85
End: 2020-11-11
Attending: PEDIATRICS
Payer: MEDICARE

## 2020-11-11 ENCOUNTER — HOSPITAL ENCOUNTER (OUTPATIENT)
Dept: ULTRASOUND IMAGING | Age: 85
Discharge: HOME | End: 2020-11-11
Attending: INTERNAL MEDICINE
Payer: MEDICARE

## 2020-11-11 DIAGNOSIS — R63.4 WEIGHT LOSS: ICD-10-CM

## 2020-11-11 DIAGNOSIS — N18.30 STAGE 3 CHRONIC KIDNEY DISEASE, UNSPECIFIED WHETHER STAGE 3A OR 3B CKD (HCC): ICD-10-CM

## 2020-11-11 DIAGNOSIS — N17.9 AKI (ACUTE KIDNEY INJURY) (HCC): ICD-10-CM

## 2020-11-11 PROCEDURE — 83516 IMMUNOASSAY NONANTIBODY: CPT

## 2020-11-11 PROCEDURE — 86038 ANTINUCLEAR ANTIBODIES: CPT

## 2020-11-11 PROCEDURE — 83883 ASSAY NEPHELOMETRY NOT SPEC: CPT

## 2020-11-11 PROCEDURE — 86160 COMPLEMENT ANTIGEN: CPT

## 2020-11-11 PROCEDURE — 80048 BASIC METABOLIC PNL TOTAL CA: CPT

## 2020-11-11 PROCEDURE — 86255 FLUORESCENT ANTIBODY SCREEN: CPT

## 2020-11-11 PROCEDURE — 83876 ASSAY MYELOPEROXIDASE: CPT

## 2020-11-11 PROCEDURE — 84165 PROTEIN E-PHORESIS SERUM: CPT

## 2020-11-11 PROCEDURE — 36415 COLL VENOUS BLD VENIPUNCTURE: CPT

## 2020-11-11 PROCEDURE — 86334 IMMUNOFIX E-PHORESIS SERUM: CPT

## 2020-11-11 PROCEDURE — 76700 US EXAM ABDOM COMPLETE: CPT | Performed by: INTERNAL MEDICINE

## 2020-11-16 ENCOUNTER — TELEPHONE (OUTPATIENT)
Dept: NEPHROLOGY | Facility: CLINIC | Age: 85
End: 2020-11-16

## 2020-11-16 DIAGNOSIS — R80.9 PROTEINURIA, UNSPECIFIED TYPE: ICD-10-CM

## 2020-11-16 DIAGNOSIS — N18.30 STAGE 3 CHRONIC KIDNEY DISEASE, UNSPECIFIED WHETHER STAGE 3A OR 3B CKD (HCC): Primary | ICD-10-CM

## 2020-11-16 NOTE — TELEPHONE ENCOUNTER
Left VM for pt- US with atrophic R kidney of unclear duration; await UA / quantify proteinuria; serologies / SPEP neg- thx kendall

## 2020-11-23 ENCOUNTER — PATIENT MESSAGE (OUTPATIENT)
Dept: NEPHROLOGY | Facility: CLINIC | Age: 85
End: 2020-11-23

## 2020-11-23 DIAGNOSIS — N18.30 STAGE 3 CHRONIC KIDNEY DISEASE, UNSPECIFIED WHETHER STAGE 3A OR 3B CKD (HCC): Primary | ICD-10-CM

## 2020-11-26 DIAGNOSIS — E78.5 HYPERLIPIDEMIA LDL GOAL <100: ICD-10-CM

## 2020-11-30 ENCOUNTER — LAB ENCOUNTER (OUTPATIENT)
Dept: LAB | Age: 85
End: 2020-11-30
Attending: INTERNAL MEDICINE
Payer: MEDICARE

## 2020-11-30 DIAGNOSIS — N18.30 STAGE 3 CHRONIC KIDNEY DISEASE, UNSPECIFIED WHETHER STAGE 3A OR 3B CKD (HCC): ICD-10-CM

## 2020-11-30 PROCEDURE — 82570 ASSAY OF URINE CREATININE: CPT

## 2020-11-30 PROCEDURE — 87086 URINE CULTURE/COLONY COUNT: CPT

## 2020-11-30 PROCEDURE — 87077 CULTURE AEROBIC IDENTIFY: CPT

## 2020-11-30 PROCEDURE — 81001 URINALYSIS AUTO W/SCOPE: CPT

## 2020-11-30 PROCEDURE — 84156 ASSAY OF PROTEIN URINE: CPT

## 2020-11-30 RX ORDER — LOVASTATIN 20 MG/1
TABLET ORAL
Qty: 90 TABLET | Refills: 1 | Status: SHIPPED | OUTPATIENT
Start: 2020-11-30 | End: 2021-05-12

## 2020-11-30 NOTE — TELEPHONE ENCOUNTER
Requested Prescriptions     Pending Prescriptions Disp Refills   • LOVASTATIN 20 MG Oral Tab [Pharmacy Med Name: LOVASTATIN  20MG  TAB] 90 tablet 3     Sig: TAKE 1 TABLET BY MOUTH IN  THE EVENING     LOV 9/2/2020     Patient was asked to follow-up in: 6 mo

## 2020-11-30 NOTE — TELEPHONE ENCOUNTER
D/W pt and - US shows small R kidney of unknown etiology / duration; no mass / obstruction; await urine studies- ursula argueta

## 2020-11-30 NOTE — TELEPHONE ENCOUNTER
From: Danilo Marino  To: María Merino MD  Sent: 11/23/2020 9:41 AM CST  Subject: Test Results Andrés Umer is waiting for your comments regarding her tests .  Thanks

## 2020-12-02 ENCOUNTER — TELEPHONE (OUTPATIENT)
Dept: NEPHROLOGY | Facility: CLINIC | Age: 85
End: 2020-12-02

## 2020-12-02 DIAGNOSIS — N18.30 STAGE 3 CHRONIC KIDNEY DISEASE, UNSPECIFIED WHETHER STAGE 3A OR 3B CKD (HCC): Primary | ICD-10-CM

## 2020-12-03 NOTE — TELEPHONE ENCOUNTER
D/W pt- UA bland without proteinuria; no sx of UTI despite pyuria- will defer abx; US shows only unilateral renal atrophy; serologies / SPEP all neg- no further w/u; no biopsy at this point; will check BMP q3 months- ursula argueta

## 2020-12-03 NOTE — TELEPHONE ENCOUNTER
Addendum- d/w pt- agree with avoiding PPI (stopped recently by Dr. Donavan Severe) to eliminate risk of interstitial nephritis)- use H2 blocker as needed- ursula argueta

## 2021-01-11 PROBLEM — E03.8 SUBCLINICAL HYPOTHYROIDISM: Status: ACTIVE | Noted: 2021-01-11

## 2021-02-01 ENCOUNTER — TELEPHONE (OUTPATIENT)
Dept: FAMILY MEDICINE CLINIC | Facility: CLINIC | Age: 86
End: 2021-02-01

## 2021-02-01 ENCOUNTER — OFFICE VISIT (OUTPATIENT)
Dept: FAMILY MEDICINE CLINIC | Facility: CLINIC | Age: 86
End: 2021-02-01
Payer: COMMERCIAL

## 2021-02-01 VITALS
HEIGHT: 63 IN | SYSTOLIC BLOOD PRESSURE: 128 MMHG | DIASTOLIC BLOOD PRESSURE: 80 MMHG | WEIGHT: 149.19 LBS | RESPIRATION RATE: 22 BRPM | BODY MASS INDEX: 26.43 KG/M2 | TEMPERATURE: 98 F | HEART RATE: 98 BPM

## 2021-02-01 DIAGNOSIS — R35.0 URINARY FREQUENCY: Primary | ICD-10-CM

## 2021-02-01 LAB
MULTISTIX LOT#: 1044 NUMERIC
PH, URINE: 7 (ref 4.5–8)
SPECIFIC GRAVITY: 1.02 (ref 1–1.03)
URINE-COLOR: YELLOW
UROBILINOGEN,SEMI-QN: 0.2 MG/DL (ref 0–1.9)

## 2021-02-01 PROCEDURE — 87086 URINE CULTURE/COLONY COUNT: CPT | Performed by: FAMILY MEDICINE

## 2021-02-01 PROCEDURE — 81003 URINALYSIS AUTO W/O SCOPE: CPT | Performed by: FAMILY MEDICINE

## 2021-02-01 PROCEDURE — 3008F BODY MASS INDEX DOCD: CPT | Performed by: FAMILY MEDICINE

## 2021-02-01 PROCEDURE — 99213 OFFICE O/P EST LOW 20 MIN: CPT | Performed by: FAMILY MEDICINE

## 2021-02-01 PROCEDURE — 87077 CULTURE AEROBIC IDENTIFY: CPT | Performed by: FAMILY MEDICINE

## 2021-02-01 PROCEDURE — 3079F DIAST BP 80-89 MM HG: CPT | Performed by: FAMILY MEDICINE

## 2021-02-01 PROCEDURE — 3074F SYST BP LT 130 MM HG: CPT | Performed by: FAMILY MEDICINE

## 2021-02-01 NOTE — PROGRESS NOTES
Subjective   HPI:   This is a 80year old female coming in for had concerns including Urinary Frequency (its been going on for a week, frequency of going to the bathroom). .  No dysuria. But up 304 x daily. Medium amount.      HPI   See reviewed tab for PMSF

## 2021-02-01 NOTE — TELEPHONE ENCOUNTER
Pt requesting a call back regarding a bladder infection.  Appt offered but wants to talk to a nurse first

## 2021-02-01 NOTE — TELEPHONE ENCOUNTER
Pt states that she has had to get up 3-4 times last night and the night before to urinate. Denies pain with urination or a fever.     Future Appointments   Date Time Provider Laurent Sawyer   2/1/2021  3:30 PM Rosalia Cesar MD [facility]   2/2/202

## 2021-02-02 ENCOUNTER — LAB ENCOUNTER (OUTPATIENT)
Dept: LAB | Age: 86
End: 2021-02-02
Attending: FAMILY MEDICINE
Payer: MEDICARE

## 2021-02-02 ENCOUNTER — TELEPHONE (OUTPATIENT)
Dept: FAMILY MEDICINE CLINIC | Facility: CLINIC | Age: 86
End: 2021-02-02

## 2021-02-02 DIAGNOSIS — E78.2 MIXED HYPERLIPIDEMIA: ICD-10-CM

## 2021-02-02 DIAGNOSIS — N30.00 ACUTE CYSTITIS WITHOUT HEMATURIA: Primary | ICD-10-CM

## 2021-02-02 DIAGNOSIS — Z00.00 LABORATORY EXAMINATION ORDERED AS PART OF A ROUTINE GENERAL MEDICAL EXAMINATION: ICD-10-CM

## 2021-02-02 DIAGNOSIS — R73.9 HYPERGLYCEMIA: ICD-10-CM

## 2021-02-02 DIAGNOSIS — N18.4 CHRONIC KIDNEY DISEASE, STAGE 4 (SEVERE) (HCC): ICD-10-CM

## 2021-02-02 DIAGNOSIS — R73.03 PREDIABETES: ICD-10-CM

## 2021-02-02 DIAGNOSIS — E03.8 SUBCLINICAL HYPOTHYROIDISM: ICD-10-CM

## 2021-02-02 LAB
ALBUMIN SERPL-MCNC: 3.5 G/DL (ref 3.4–5)
ALBUMIN/GLOB SERPL: 0.8 {RATIO} (ref 1–2)
ALP LIVER SERPL-CCNC: 138 U/L
ALT SERPL-CCNC: 19 U/L
ANION GAP SERPL CALC-SCNC: 4 MMOL/L (ref 0–18)
AST SERPL-CCNC: 18 U/L (ref 15–37)
BILIRUB SERPL-MCNC: 0.3 MG/DL (ref 0.1–2)
BUN BLD-MCNC: 37 MG/DL (ref 7–18)
BUN/CREAT SERPL: 20.7 (ref 10–20)
CALCIUM BLD-MCNC: 9.7 MG/DL (ref 8.5–10.1)
CHLORIDE SERPL-SCNC: 107 MMOL/L (ref 98–112)
CHOLEST SMN-MCNC: 146 MG/DL (ref ?–200)
CO2 SERPL-SCNC: 29 MMOL/L (ref 21–32)
CREAT BLD-MCNC: 1.79 MG/DL
DEPRECATED RDW RBC AUTO: 42.5 FL (ref 35.1–46.3)
ERYTHROCYTE [DISTWIDTH] IN BLOOD BY AUTOMATED COUNT: 12.2 % (ref 11–15)
EST. AVERAGE GLUCOSE BLD GHB EST-MCNC: 126 MG/DL (ref 68–126)
GLOBULIN PLAS-MCNC: 4.3 G/DL (ref 2.8–4.4)
GLUCOSE BLD-MCNC: 93 MG/DL (ref 70–99)
HBA1C MFR BLD HPLC: 6 % (ref ?–5.7)
HCT VFR BLD AUTO: 38.6 %
HDLC SERPL-MCNC: 38 MG/DL (ref 40–59)
HGB BLD-MCNC: 12.4 G/DL
LDLC SERPL CALC-MCNC: 78 MG/DL (ref ?–100)
M PROTEIN MFR SERPL ELPH: 7.8 G/DL (ref 6.4–8.2)
MCH RBC QN AUTO: 30.8 PG (ref 26–34)
MCHC RBC AUTO-ENTMCNC: 32.1 G/DL (ref 31–37)
MCV RBC AUTO: 95.8 FL
NONHDLC SERPL-MCNC: 108 MG/DL (ref ?–130)
OSMOLALITY SERPL CALC.SUM OF ELEC: 298 MOSM/KG (ref 275–295)
PATIENT FASTING Y/N/NP: NO
PATIENT FASTING Y/N/NP: NO
PLATELET # BLD AUTO: 243 10(3)UL (ref 150–450)
POTASSIUM SERPL-SCNC: 4.7 MMOL/L (ref 3.5–5.1)
RBC # BLD AUTO: 4.03 X10(6)UL
SODIUM SERPL-SCNC: 140 MMOL/L (ref 136–145)
TRIGL SERPL-MCNC: 149 MG/DL (ref 30–149)
TSI SER-ACNC: 2.84 MIU/ML (ref 0.36–3.74)
VLDLC SERPL CALC-MCNC: 30 MG/DL (ref 0–30)
WBC # BLD AUTO: 6.4 X10(3) UL (ref 4–11)

## 2021-02-02 PROCEDURE — 85027 COMPLETE CBC AUTOMATED: CPT

## 2021-02-02 PROCEDURE — 36415 COLL VENOUS BLD VENIPUNCTURE: CPT

## 2021-02-02 PROCEDURE — 80053 COMPREHEN METABOLIC PANEL: CPT

## 2021-02-02 PROCEDURE — 83036 HEMOGLOBIN GLYCOSYLATED A1C: CPT

## 2021-02-02 PROCEDURE — 84443 ASSAY THYROID STIM HORMONE: CPT

## 2021-02-02 PROCEDURE — 80061 LIPID PANEL: CPT

## 2021-02-02 RX ORDER — CEPHALEXIN 500 MG/1
500 CAPSULE ORAL 3 TIMES DAILY
Qty: 21 CAPSULE | Refills: 0 | Status: SHIPPED | OUTPATIENT
Start: 2021-02-02 | End: 2021-02-09

## 2021-02-02 NOTE — TELEPHONE ENCOUNTER
Diagnoses and all orders for this visit:    Acute cystitis without hematuria  -     cephALEXin 500 MG Oral Cap; Take 1 capsule (500 mg total) by mouth 3 (three) times daily for 7 days. OK to notify.  Thanks, Tye Willis MD

## 2021-02-02 NOTE — TELEPHONE ENCOUNTER
Pt was in yesterday and Dr Ayden De Los Santos sent them a my chart message about putting pt on Cephalexin and her  couldn't get his computer to respond Dr Ayden De Los Santos so he calling to let us know that they would like Dr Ayden De Los Santos to call that into her local pharmacy.

## 2021-02-04 ENCOUNTER — TELEPHONE (OUTPATIENT)
Dept: FAMILY MEDICINE CLINIC | Facility: CLINIC | Age: 86
End: 2021-02-04

## 2021-02-09 NOTE — ASSESSMENT & PLAN NOTE
Stable, Continue present management.     Thyroid  (most recent labs)   Lab Results   Component Value Date/Time    TSH 2.840 02/02/2021 09:08 AM

## 2021-02-09 NOTE — ASSESSMENT & PLAN NOTE
Stable GFR.  Continue present management   Lab Results   Component Value Date/Time    CREATSERUM 1.79 (H) 02/02/2021 09:08 AM    GFR 49 (L) 11/06/2017 08:23 AM    GFRNAA 25 (L) 02/02/2021 09:08 AM

## 2021-02-10 ENCOUNTER — OFFICE VISIT (OUTPATIENT)
Dept: FAMILY MEDICINE CLINIC | Facility: CLINIC | Age: 86
End: 2021-02-10
Payer: COMMERCIAL

## 2021-02-10 ENCOUNTER — TELEPHONE (OUTPATIENT)
Dept: FAMILY MEDICINE CLINIC | Facility: CLINIC | Age: 86
End: 2021-02-10

## 2021-02-10 VITALS
BODY MASS INDEX: 24.92 KG/M2 | HEART RATE: 83 BPM | SYSTOLIC BLOOD PRESSURE: 132 MMHG | HEIGHT: 63 IN | DIASTOLIC BLOOD PRESSURE: 80 MMHG | WEIGHT: 140.63 LBS | TEMPERATURE: 97 F | RESPIRATION RATE: 18 BRPM

## 2021-02-10 DIAGNOSIS — N18.4 CHRONIC KIDNEY DISEASE, STAGE 4 (SEVERE) (HCC): ICD-10-CM

## 2021-02-10 DIAGNOSIS — E03.8 SUBCLINICAL HYPOTHYROIDISM: ICD-10-CM

## 2021-02-10 DIAGNOSIS — I70.0 AORTIC ATHEROSCLEROSIS (HCC): ICD-10-CM

## 2021-02-10 DIAGNOSIS — I10 ESSENTIAL HYPERTENSION: ICD-10-CM

## 2021-02-10 DIAGNOSIS — M48.062 SPINAL STENOSIS OF LUMBAR REGION WITH NEUROGENIC CLAUDICATION: ICD-10-CM

## 2021-02-10 DIAGNOSIS — M47.816 ARTHRITIS OF FACET JOINT OF LUMBAR SPINE: ICD-10-CM

## 2021-02-10 DIAGNOSIS — R73.03 PREDIABETES: ICD-10-CM

## 2021-02-10 DIAGNOSIS — E78.2 MIXED HYPERLIPIDEMIA: ICD-10-CM

## 2021-02-10 DIAGNOSIS — Z00.00 ENCOUNTER FOR ANNUAL HEALTH EXAMINATION: ICD-10-CM

## 2021-02-10 DIAGNOSIS — N39.41 URGE INCONTINENCE OF URINE: ICD-10-CM

## 2021-02-10 DIAGNOSIS — Z00.00 ANNUAL PHYSICAL EXAM: Primary | ICD-10-CM

## 2021-02-10 PROCEDURE — 3075F SYST BP GE 130 - 139MM HG: CPT | Performed by: FAMILY MEDICINE

## 2021-02-10 PROCEDURE — 96160 PT-FOCUSED HLTH RISK ASSMT: CPT | Performed by: FAMILY MEDICINE

## 2021-02-10 PROCEDURE — 99397 PER PM REEVAL EST PAT 65+ YR: CPT | Performed by: FAMILY MEDICINE

## 2021-02-10 PROCEDURE — 3079F DIAST BP 80-89 MM HG: CPT | Performed by: FAMILY MEDICINE

## 2021-02-10 PROCEDURE — G0439 PPPS, SUBSEQ VISIT: HCPCS | Performed by: FAMILY MEDICINE

## 2021-02-10 PROCEDURE — 3008F BODY MASS INDEX DOCD: CPT | Performed by: FAMILY MEDICINE

## 2021-02-10 RX ORDER — DOXYCYCLINE HYCLATE 100 MG
100 TABLET ORAL 2 TIMES DAILY
Qty: 20 TABLET | Refills: 0 | Status: SHIPPED | OUTPATIENT
Start: 2021-02-10 | End: 2021-02-20

## 2021-02-10 RX ORDER — AMOXICILLIN 500 MG/1
1000 CAPSULE ORAL 2 TIMES DAILY
Qty: 40 CAPSULE | Refills: 0 | Status: CANCELLED | OUTPATIENT
Start: 2021-02-10

## 2021-02-10 NOTE — TELEPHONE ENCOUNTER
Pt's  called in stating he and his wife just had an appointment with Dr. Zenovia Cooks and he was supposed to send a prescription for amoxicillin to her pharmacy so that pt could start today.  Spouse states prescription was never sent and Dr. Zenovia Cooks wanted pt t

## 2021-02-10 NOTE — TELEPHONE ENCOUNTER
Spoke with Darryleryle Burr and her , telling them that Jagdish Hurst sent Doxycycline (due to her allergy to PCN) into [facility] in [place]. They both verbalized understanding.

## 2021-02-10 NOTE — PROGRESS NOTES
HPI:   Wayne Horne is a [address]year old female who presents for a MA (Medicare Advantage) Supervisit (Once per calendar year).     Overall doing well, no new complaints, stable overall function, work-up with Dr. Galindo Daniel for kidney stage IV is underway and s Labs:   Lab Results   Component Value Date    CHOLEST 146 02/02/2021    HDL 38 (L) 02/02/2021    LDL 78 02/02/2021    TRIG 149 02/02/2021          Last Chemistry Labs:   Lab Results   Component Value Date    AST 18 02/02/2021    ALT 19 02/02/2021    CA 9.7 reports previous alcohol use. She reports that she does not use drugs.      REVIEW OF SYSTEMS:   Review of Systems      EXAM:   /80   Pulse 83   Temp 97.3 °F (36.3 °C) (Temporal)   Resp 18   Ht 5' 3\" (1.6 m)   Wt 140 lb 9.6 oz (63.8 kg)   BMI 24.91 k spine Xray, \"Arterial calcifications are present. No direct demonstration of an abdominal aortic aneurysm. \" lovastatin 20         Current Assessment & Plan     Stable continue present management          Essential hypertension    Overview     lisinoril 2 PLATELET    Urinary incontinence    Overview     Stopped Vesicare 5,  Oxybutynin XL 5 2016.  Myrtbiq too expensive         Current Assessment & Plan     Stable continue present management             Other    Prediabetes    Overview     A1c 6.0% 10/2014 provided for quick review of chart, separate sheet to patient  [address] Internal Lab or Procedure External Lab or Procedure   Diabetes Screening      HbgA1C   Annually HGBA1C (%)   Date Value   05/01/2014 5.9 (H) applicable)     Influenza  Covered Annually 09/18/2020 Please get every year    Pneumococcal 13 (Prevnar)  Covered Once after 65 09/25/2015 Please get once after your 65th birthday    Pneumococcal 23 (Pneumovax)  Covered Once after 65 09/10/2008 Please get

## 2021-02-18 ENCOUNTER — OFFICE VISIT (OUTPATIENT)
Dept: DERMATOLOGY | Age: 86
End: 2021-02-18

## 2021-02-18 DIAGNOSIS — L82.1 SEBORRHEIC KERATOSIS: Primary | ICD-10-CM

## 2021-02-18 DIAGNOSIS — D18.01 CHERRY ANGIOMA: ICD-10-CM

## 2021-02-18 PROCEDURE — 99213 OFFICE O/P EST LOW 20 MIN: CPT | Performed by: DERMATOLOGY

## 2021-03-01 ENCOUNTER — TELEPHONE (OUTPATIENT)
Dept: NEPHROLOGY | Facility: CLINIC | Age: 86
End: 2021-03-01

## 2021-03-01 ENCOUNTER — LAB ENCOUNTER (OUTPATIENT)
Dept: LAB | Age: 86
End: 2021-03-01
Attending: INTERNAL MEDICINE
Payer: MEDICARE

## 2021-03-01 DIAGNOSIS — R80.9 PROTEINURIA, UNSPECIFIED TYPE: ICD-10-CM

## 2021-03-01 DIAGNOSIS — N18.30 STAGE 3 CHRONIC KIDNEY DISEASE, UNSPECIFIED WHETHER STAGE 3A OR 3B CKD (HCC): ICD-10-CM

## 2021-03-01 LAB
ANION GAP SERPL CALC-SCNC: 9 MMOL/L (ref 0–18)
BILIRUB UR QL STRIP.AUTO: NEGATIVE
BUN BLD-MCNC: 31 MG/DL (ref 7–18)
BUN/CREAT SERPL: 17.4 (ref 10–20)
CALCIUM BLD-MCNC: 10 MG/DL (ref 8.5–10.1)
CHLORIDE SERPL-SCNC: 107 MMOL/L (ref 98–112)
CLARITY UR REFRACT.AUTO: CLEAR
CO2 SERPL-SCNC: 25 MMOL/L (ref 21–32)
COLOR UR AUTO: YELLOW
CREAT BLD-MCNC: 1.78 MG/DL
CREAT UR-SCNC: 111 MG/DL
GLUCOSE BLD-MCNC: 110 MG/DL (ref 70–99)
GLUCOSE UR STRIP.AUTO-MCNC: NEGATIVE MG/DL
KETONES UR STRIP.AUTO-MCNC: NEGATIVE MG/DL
NITRITE UR QL STRIP.AUTO: NEGATIVE
OSMOLALITY SERPL CALC.SUM OF ELEC: 299 MOSM/KG (ref 275–295)
PATIENT FASTING Y/N/NP: NO
PH UR STRIP.AUTO: 6 [PH] (ref 5–8)
POTASSIUM SERPL-SCNC: 4.2 MMOL/L (ref 3.5–5.1)
PROT UR STRIP.AUTO-MCNC: NEGATIVE MG/DL
PROT UR-MCNC: 19.7 MG/DL
RBC UR QL AUTO: NEGATIVE
SODIUM SERPL-SCNC: 141 MMOL/L (ref 136–145)
SP GR UR STRIP.AUTO: 1.01 (ref 1–1.03)
UROBILINOGEN UR STRIP.AUTO-MCNC: <2 MG/DL

## 2021-03-01 PROCEDURE — 82570 ASSAY OF URINE CREATININE: CPT

## 2021-03-01 PROCEDURE — 36415 COLL VENOUS BLD VENIPUNCTURE: CPT

## 2021-03-01 PROCEDURE — 81001 URINALYSIS AUTO W/SCOPE: CPT

## 2021-03-01 PROCEDURE — 84156 ASSAY OF PROTEIN URINE: CPT

## 2021-03-01 PROCEDURE — 80048 BASIC METABOLIC PNL TOTAL CA: CPT

## 2021-05-07 DIAGNOSIS — E78.5 HYPERLIPIDEMIA LDL GOAL <100: ICD-10-CM

## 2021-05-12 RX ORDER — LOVASTATIN 20 MG/1
TABLET ORAL
Qty: 90 TABLET | Refills: 3 | Status: SHIPPED | OUTPATIENT
Start: 2021-05-12

## 2021-05-12 NOTE — TELEPHONE ENCOUNTER
LOVASTATIN  20MG  TAB     Sig: TAKE 1 TABLET BY MOUTH IN  THE EVENING    Disp:  90 tablet    Refills:  3    Start: 5/7/2021    Class: Normal    Non-formulary For: Hyperlipidemia LDL goal <100    To pharmacy: Requesting 1 year supply    Last ordered: 5 jeff

## 2021-05-12 NOTE — TELEPHONE ENCOUNTER
LOV 2/10/21 w/Glas for physical  Last lipid 2/2/21  Upcoming visit schedule July 21 w/Gls  Will refill for one year      Mixed hyperlipidemia      Overview       Lovastatin 20, niacin 500           Current Assessment & Plan       Stable, Continue present m

## 2021-06-09 ENCOUNTER — HOSPITAL ENCOUNTER (OUTPATIENT)
Age: 86
Discharge: HOME | End: 2021-06-09
Payer: MEDICARE

## 2021-06-09 ENCOUNTER — APPOINTMENT (OUTPATIENT)
Dept: CT IMAGING | Age: 86
End: 2021-06-09
Attending: PHYSICIAN ASSISTANT
Payer: MEDICARE

## 2021-06-09 VITALS
DIASTOLIC BLOOD PRESSURE: 89 MMHG | HEART RATE: 84 BPM | RESPIRATION RATE: 14 BRPM | OXYGEN SATURATION: 95 % | SYSTOLIC BLOOD PRESSURE: 175 MMHG

## 2021-06-09 DIAGNOSIS — S01.01XA LACERATION OF SCALP, INITIAL ENCOUNTER: ICD-10-CM
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
## 2021-06-09 PROCEDURE — 90714 TD VACC NO PRESV 7 YRS+ IM: CPT | Performed by: PHYSICIAN ASSISTANT

## 2021-06-09 PROCEDURE — 72125 CT NECK SPINE W/O DYE: CPT | Performed by: PHYSICIAN ASSISTANT

## 2021-06-09 PROCEDURE — 99213 OFFICE O/P EST LOW 20 MIN: CPT | Performed by: PHYSICIAN ASSISTANT

## 2021-06-09 PROCEDURE — 90471 IMMUNIZATION ADMIN: CPT | Performed by: PHYSICIAN ASSISTANT

## 2021-06-09 PROCEDURE — 12011 RPR F/E/E/N/L/M 2.5 CM/<: CPT | Performed by: PHYSICIAN ASSISTANT

## 2021-06-09 PROCEDURE — 70450 CT HEAD/BRAIN W/O DYE: CPT | Performed by: PHYSICIAN ASSISTANT

## 2021-06-09 RX ORDER — TETANUS AND DIPHTHERIA TOXOIDS ADSORBED 2; 2 [LF]/.5ML; [LF]/.5ML
0.5 INJECTION INTRAMUSCULAR ONCE
Status: COMPLETED | OUTPATIENT
Start: 2021-06-09 | End: 2021-06-09

## 2021-06-09 NOTE — ED PROVIDER NOTES
Patient Seen in: [facility] [address] [facility]      History   Patient presents with:  Laceration/Abrasion    Stated Complaint: head injury/fell at home    HPI/Subjective:   HPI    75-year-old female arrives with family.   Just prior to arrival she experienced a f air)       Current:BP (!) 172/88   Pulse 97   Resp 12   SpO2 95%         Physical Exam      Gen: Well appearing, well groomed, alert and aware x 3  Neck: Supple, full range of motion, no thyromegaly or lymphadenopathy. No cervical point tenderness.   Eye e List

## 2021-06-09 NOTE — ED INITIAL ASSESSMENT (HPI)
Pt sts lost balance at 11:40 and hit right side of back of the head on the corner of a wall at home. No LOC.

## 2021-06-21 ENCOUNTER — HOSPITAL ENCOUNTER (OUTPATIENT)
Age: 86
Discharge: HOME | End: 2021-06-21
Payer: MEDICARE

## 2021-06-21 VITALS
TEMPERATURE: 97 F | OXYGEN SATURATION: 95 % | RESPIRATION RATE: 18 BRPM | DIASTOLIC BLOOD PRESSURE: 70 MMHG | HEART RATE: 88 BPM | SYSTOLIC BLOOD PRESSURE: 130 MMHG

## 2021-06-21 DIAGNOSIS — Z48.02 ENCOUNTER FOR STAPLE REMOVAL: Primary | ICD-10-CM

## 2021-06-21 PROCEDURE — 99024 POSTOP FOLLOW-UP VISIT: CPT | Performed by: NURSE PRACTITIONER

## 2021-06-21 NOTE — ED PROVIDER NOTES
Patient Seen in: [facility]      History   Patient presents with:  Sut Stap Aston    Stated Complaint: suture removal    HPI/Subjective:   60-year-old female presents to the IC for removal of 6 staples from the back of the head.   Patient /70   Pulse 88   Resp 18   Temp 97.3 °F (36.3 °C)   Temp src    SpO2 95 %   O2 Device None (Room air)       Current:/70   Pulse 88   Temp 97.3 °F (36.3 °C)   Resp 18   SpO2 95%         Physical Exam  Vitals and nursing note reviewed.    Constitu

## 2021-07-08 ENCOUNTER — OFFICE VISIT (OUTPATIENT)
Dept: FAMILY MEDICINE CLINIC | Facility: CLINIC | Age: 86
End: 2021-07-08
Payer: COMMERCIAL

## 2021-07-08 VITALS
RESPIRATION RATE: 12 BRPM | HEART RATE: 86 BPM | SYSTOLIC BLOOD PRESSURE: 132 MMHG | TEMPERATURE: 98 F | WEIGHT: 151 LBS | DIASTOLIC BLOOD PRESSURE: 80 MMHG | HEIGHT: 62 IN | BODY MASS INDEX: 27.79 KG/M2

## 2021-07-08 DIAGNOSIS — R73.03 PREDIABETES: ICD-10-CM

## 2021-07-08 DIAGNOSIS — E03.8 SUBCLINICAL HYPOTHYROIDISM: ICD-10-CM

## 2021-07-08 DIAGNOSIS — I70.0 AORTIC ATHEROSCLEROSIS (HCC): ICD-10-CM

## 2021-07-08 DIAGNOSIS — I10 ESSENTIAL HYPERTENSION: Primary | ICD-10-CM

## 2021-07-08 DIAGNOSIS — N18.4 CHRONIC KIDNEY DISEASE, STAGE 4 (SEVERE) (HCC): ICD-10-CM

## 2021-07-08 DIAGNOSIS — E78.2 MIXED HYPERLIPIDEMIA: ICD-10-CM

## 2021-07-08 PROCEDURE — 3008F BODY MASS INDEX DOCD: CPT | Performed by: FAMILY MEDICINE

## 2021-07-08 PROCEDURE — 99214 OFFICE O/P EST MOD 30 MIN: CPT | Performed by: FAMILY MEDICINE

## 2021-07-08 PROCEDURE — 3079F DIAST BP 80-89 MM HG: CPT | Performed by: FAMILY MEDICINE

## 2021-07-08 PROCEDURE — 3075F SYST BP GE 130 - 139MM HG: CPT | Performed by: FAMILY MEDICINE

## 2021-07-08 NOTE — PROGRESS NOTES
HPI/Subjective:   Roger Hernandez is a 80year old female who presents for recheck of her Pre-diabetes. We have been following this elevated Blood sugars and patieint is doing well. Fell 1 jeff ago with bending over to get lettuce. 6 stabples.  .    had Heart sounds: Normal heart sounds. No murmur heard. Pulmonary:      Effort: Pulmonary effort is normal. No respiratory distress. Breath sounds: Normal breath sounds. No wheezing.    Abdominal:      General: Bowel sounds are normal.      Palpations: Continue present management. Cholesterol Lowering Medications          LOVASTATIN 20 MG Oral Tab          3. Aortic atherosclerosis (HCC)  Overview:  Seen on 2011 L spine Xray, \"Arterial calcifications are present.  No direct demonstration of an abdomin

## 2021-08-18 ENCOUNTER — OFFICE VISIT (OUTPATIENT)
Dept: DERMATOLOGY | Age: 86
End: 2021-08-18

## 2021-08-18 DIAGNOSIS — L82.1 SEBORRHEIC KERATOSIS: Primary | ICD-10-CM

## 2021-08-18 DIAGNOSIS — Z12.83 SCREENING EXAM FOR SKIN CANCER: ICD-10-CM

## 2021-08-18 PROCEDURE — 99212 OFFICE O/P EST SF 10 MIN: CPT | Performed by: DERMATOLOGY

## 2022-02-04 ENCOUNTER — TELEPHONE (OUTPATIENT)
Dept: FAMILY MEDICINE CLINIC | Facility: CLINIC | Age: 87
End: 2022-02-04

## 2022-02-07 ENCOUNTER — LAB ENCOUNTER (OUTPATIENT)
Dept: LAB | Age: 87
End: 2022-02-07
Attending: FAMILY MEDICINE
Payer: MEDICARE

## 2022-02-07 DIAGNOSIS — E78.2 MIXED HYPERLIPIDEMIA: ICD-10-CM

## 2022-02-07 DIAGNOSIS — I10 ESSENTIAL HYPERTENSION: ICD-10-CM

## 2022-02-07 DIAGNOSIS — N18.4 CHRONIC KIDNEY DISEASE, STAGE 4 (SEVERE) (HCC): ICD-10-CM

## 2022-02-07 DIAGNOSIS — R73.03 PREDIABETES: ICD-10-CM

## 2022-02-07 DIAGNOSIS — E03.8 SUBCLINICAL HYPOTHYROIDISM: ICD-10-CM

## 2022-02-07 PROBLEM — M46.96 UNSPECIFIED INFLAMMATORY SPONDYLOPATHY, LUMBAR REGION: Status: ACTIVE | Noted: 2017-05-02

## 2022-02-07 PROBLEM — M46.96 UNSPECIFIED INFLAMMATORY SPONDYLOPATHY, LUMBAR REGION (HCC): Status: ACTIVE | Noted: 2017-05-02

## 2022-02-07 LAB
ALBUMIN SERPL-MCNC: 3.8 G/DL (ref 3.4–5)
ALBUMIN/GLOB SERPL: 1.2 {RATIO} (ref 1–2)
ALP LIVER SERPL-CCNC: 108 U/L
ALT SERPL-CCNC: 21 U/L
ANION GAP SERPL CALC-SCNC: 5 MMOL/L (ref 0–18)
AST SERPL-CCNC: 17 U/L (ref 15–37)
BASOPHILS # BLD AUTO: 0.05 X10(3) UL (ref 0–0.2)
BASOPHILS NFR BLD AUTO: 0.8 %
BILIRUB SERPL-MCNC: 0.5 MG/DL (ref 0.1–2)
BUN BLD-MCNC: 37 MG/DL (ref 7–18)
CALCIUM BLD-MCNC: 10.1 MG/DL (ref 8.5–10.1)
CHLORIDE SERPL-SCNC: 105 MMOL/L (ref 98–112)
CHOLEST SERPL-MCNC: 155 MG/DL (ref ?–200)
CO2 SERPL-SCNC: 29 MMOL/L (ref 21–32)
CREAT BLD-MCNC: 1.89 MG/DL
EOSINOPHIL # BLD AUTO: 0.24 X10(3) UL (ref 0–0.7)
EOSINOPHIL NFR BLD AUTO: 3.6 %
ERYTHROCYTE [DISTWIDTH] IN BLOOD BY AUTOMATED COUNT: 12.3 %
EST. AVERAGE GLUCOSE BLD GHB EST-MCNC: 123 MG/DL (ref 68–126)
FASTING PATIENT LIPID ANSWER: YES
FASTING STATUS PATIENT QL REPORTED: YES
GLOBULIN PLAS-MCNC: 3.2 G/DL (ref 2.8–4.4)
GLUCOSE BLD-MCNC: 103 MG/DL (ref 70–99)
HBA1C MFR BLD: 5.9 % (ref ?–5.7)
HCT VFR BLD AUTO: 42.7 %
HDLC SERPL-MCNC: 47 MG/DL (ref 40–59)
HGB BLD-MCNC: 13.7 G/DL
IMM GRANULOCYTES # BLD AUTO: 0.02 X10(3) UL (ref 0–1)
LDLC SERPL CALC-MCNC: 84 MG/DL (ref ?–100)
LYMPHOCYTES # BLD AUTO: 1.17 X10(3) UL (ref 1–4)
LYMPHOCYTES NFR BLD AUTO: 17.8 %
MCH RBC QN AUTO: 30.6 PG (ref 26–34)
MCHC RBC AUTO-ENTMCNC: 32.1 G/DL (ref 31–37)
MCV RBC AUTO: 95.3 FL
MONOCYTES # BLD AUTO: 0.52 X10(3) UL (ref 0.1–1)
MONOCYTES NFR BLD AUTO: 7.9 %
NEUTROPHILS # BLD AUTO: 4.58 X10 (3) UL (ref 1.5–7.7)
NEUTROPHILS # BLD AUTO: 4.58 X10(3) UL (ref 1.5–7.7)
NEUTROPHILS NFR BLD AUTO: 69.6 %
NONHDLC SERPL-MCNC: 108 MG/DL (ref ?–130)
OSMOLALITY SERPL CALC.SUM OF ELEC: 297 MOSM/KG (ref 275–295)
PLATELET # BLD AUTO: 211 10(3)UL (ref 150–450)
POTASSIUM SERPL-SCNC: 4.5 MMOL/L (ref 3.5–5.1)
PROT SERPL-MCNC: 7 G/DL (ref 6.4–8.2)
RBC # BLD AUTO: 4.48 X10(6)UL
SODIUM SERPL-SCNC: 139 MMOL/L (ref 136–145)
T4 FREE SERPL-MCNC: 1.1 NG/DL (ref 0.8–1.7)
TRIGL SERPL-MCNC: 136 MG/DL (ref 30–149)
TSI SER-ACNC: 2.45 MIU/ML (ref 0.36–3.74)
VLDLC SERPL CALC-MCNC: 22 MG/DL (ref 0–30)
WBC # BLD AUTO: 6.6 X10(3) UL (ref 4–11)

## 2022-02-07 PROCEDURE — 84443 ASSAY THYROID STIM HORMONE: CPT

## 2022-02-07 PROCEDURE — 36415 COLL VENOUS BLD VENIPUNCTURE: CPT

## 2022-02-07 PROCEDURE — 85025 COMPLETE CBC W/AUTO DIFF WBC: CPT

## 2022-02-07 PROCEDURE — 80053 COMPREHEN METABOLIC PANEL: CPT

## 2022-02-07 PROCEDURE — 83036 HEMOGLOBIN GLYCOSYLATED A1C: CPT

## 2022-02-07 PROCEDURE — 80061 LIPID PANEL: CPT

## 2022-02-07 PROCEDURE — 84439 ASSAY OF FREE THYROXINE: CPT

## 2022-02-08 NOTE — ASSESSMENT & PLAN NOTE
As for her Pre-Diabetes, it is well controlled, no significant medication side effects noted. Recommendations are: continue present meds, lose weight by increased dietary compliance and exercise and will check labs as ordered.     Lab Results   Component Value Date    A1C 5.9 (H) 02/07/2022    A1C 6.0 (H) 02/02/2021

## 2022-02-08 NOTE — ASSESSMENT & PLAN NOTE
Stable, continue present management   Thyroid  (most recent labs)   Lab Results   Component Value Date/Time    TSH 2.450 02/07/2022 09:04 AM    T4F 1.1 02/07/2022 09:04 AM

## 2022-02-08 NOTE — ASSESSMENT & PLAN NOTE
Lab Results   Component Value Date/Time    CREATSERUM 1.89 (H) 02/07/2022 09:04 AM    GFR 49 (L) 11/06/2017 08:23 AM    GFRNAA 23 (L) 02/07/2022 09:04 AM      Stable, continue present management

## 2022-02-09 ENCOUNTER — OFFICE VISIT (OUTPATIENT)
Dept: FAMILY MEDICINE CLINIC | Facility: CLINIC | Age: 87
End: 2022-02-09
Payer: MEDICARE

## 2022-02-09 VITALS
HEART RATE: 74 BPM | SYSTOLIC BLOOD PRESSURE: 132 MMHG | WEIGHT: 145.5 LBS | RESPIRATION RATE: 18 BRPM | BODY MASS INDEX: 26.78 KG/M2 | HEIGHT: 62 IN | DIASTOLIC BLOOD PRESSURE: 66 MMHG

## 2022-02-09 DIAGNOSIS — E78.2 MIXED HYPERLIPIDEMIA: ICD-10-CM

## 2022-02-09 DIAGNOSIS — N18.4 CHRONIC KIDNEY DISEASE, STAGE 4 (SEVERE) (HCC): ICD-10-CM

## 2022-02-09 DIAGNOSIS — M46.96 UNSPECIFIED INFLAMMATORY SPONDYLOPATHY, LUMBAR REGION (HCC): ICD-10-CM

## 2022-02-09 DIAGNOSIS — M48.062 SPINAL STENOSIS OF LUMBAR REGION WITH NEUROGENIC CLAUDICATION: ICD-10-CM

## 2022-02-09 DIAGNOSIS — E03.8 SUBCLINICAL HYPOTHYROIDISM: ICD-10-CM

## 2022-02-09 DIAGNOSIS — E78.5 HYPERLIPIDEMIA LDL GOAL <100: ICD-10-CM

## 2022-02-09 DIAGNOSIS — N39.41 URGE INCONTINENCE OF URINE: ICD-10-CM

## 2022-02-09 DIAGNOSIS — Z00.00 ANNUAL PHYSICAL EXAM: Primary | ICD-10-CM

## 2022-02-09 DIAGNOSIS — I70.0 AORTIC ATHEROSCLEROSIS (HCC): ICD-10-CM

## 2022-02-09 DIAGNOSIS — R73.03 PREDIABETES: ICD-10-CM

## 2022-02-09 DIAGNOSIS — I10 ESSENTIAL HYPERTENSION: ICD-10-CM

## 2022-02-09 DIAGNOSIS — Z00.00 ENCOUNTER FOR ANNUAL HEALTH EXAMINATION: ICD-10-CM

## 2022-02-09 PROCEDURE — G0439 PPPS, SUBSEQ VISIT: HCPCS | Performed by: FAMILY MEDICINE

## 2022-02-09 PROCEDURE — 99214 OFFICE O/P EST MOD 30 MIN: CPT | Performed by: FAMILY MEDICINE

## 2022-02-09 RX ORDER — TROSPIUM CHLORIDE ER 60 MG/1
60 CAPSULE ORAL DAILY
Qty: 90 CAPSULE | Refills: 3 | Status: SHIPPED | OUTPATIENT
Start: 2022-02-09

## 2022-02-09 RX ORDER — LOVASTATIN 20 MG/1
20 TABLET ORAL EVERY EVENING
Qty: 90 TABLET | Refills: 3 | Status: SHIPPED | OUTPATIENT
Start: 2022-02-09

## 2022-02-10 ENCOUNTER — HOSPITAL ENCOUNTER (OUTPATIENT)
Age: 87
Discharge: HOME | End: 2022-02-10
Payer: MEDICARE

## 2022-02-10 ENCOUNTER — APPOINTMENT (OUTPATIENT)
Dept: CT IMAGING | Age: 87
End: 2022-02-10
Attending: PHYSICIAN ASSISTANT
Payer: MEDICARE

## 2022-02-10 VITALS
BODY MASS INDEX: 26.13 KG/M2 | DIASTOLIC BLOOD PRESSURE: 92 MMHG | RESPIRATION RATE: 16 BRPM | SYSTOLIC BLOOD PRESSURE: 164 MMHG | OXYGEN SATURATION: 94 % | HEIGHT: 62 IN | HEART RATE: 101 BPM | TEMPERATURE: 97 F | WEIGHT: 142 LBS
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
## 2022-02-10 DIAGNOSIS — S00.03XA HEMATOMA OF SCALP, INITIAL ENCOUNTER: Primary | ICD-10-CM

## 2022-02-10 PROCEDURE — 99213 OFFICE O/P EST LOW 20 MIN: CPT | Performed by: PHYSICIAN ASSISTANT

## 2022-02-10 PROCEDURE — 70450 CT HEAD/BRAIN W/O DYE: CPT | Performed by: PHYSICIAN ASSISTANT

## 2022-02-10 PROCEDURE — 72125 CT NECK SPINE W/O DYE: CPT | Performed by: PHYSICIAN ASSISTANT

## 2022-02-10 NOTE — ED INITIAL ASSESSMENT (HPI)
Pt here after she slipped opening a door handle. Pt landed on buttocks first then went back, hit head. No LOC. Some neck soreness.

## 2022-02-10 NOTE — ED QUICK NOTES
Spoke to daughter and pt regarding closely watching pt tonight and s/s to watch for. Daughter staying with patient.

## 2022-04-11 NOTE — PROGRESS NOTES
HPI:   Patient presents with:  Hypertension: 6 month f/u   Hyperlipidemia: 6 month f/u       Imelda Méndez is a 80year old female who presents for recheck of her hypertension.  She has been taking medications as instructed, with no medication side eff daily.   Niacin 500 MG Oral Tab Take 500 mg by mouth daily with breakfast.   Omega-3 Fatty Acids (FISH OIL) 1000 MG Oral Cap Take 1 capsule by mouth daily. Multiple Vitamins-Minerals (MULTIVITAMIN & MINERAL OR) Take 1 tablet by mouth daily.      Buffy maxwell Pressure is well controlled, no significant medication side effects noted.      PLAN: will continue present medications, reviewed diet, exercise and weight control, and labs as ordered    As for her cholesterol, Lipids are well controlled, no significant me Current Assessment & Plan     As for her Pre-Diabetes, it is well controlled, no significant medication side effects noted.      Recommendations are: continue present meds, lose weight by increased dietary compliance and exercise and will check labs a Male

## 2022-06-09 ENCOUNTER — PATIENT OUTREACH (OUTPATIENT)
Dept: CASE MANAGEMENT | Age: 87
End: 2022-06-09

## 2022-08-09 NOTE — ASSESSMENT & PLAN NOTE
Stable, Continue present management.     Thyroid  (most recent labs)   Lab Results   Component Value Date/Time    TSH 2.450 02/07/2022 09:04 AM    T4F 1.1 02/07/2022 09:04 AM

## 2022-08-10 ENCOUNTER — TELEPHONE (OUTPATIENT)
Dept: FAMILY MEDICINE CLINIC | Facility: CLINIC | Age: 87
End: 2022-08-10

## 2022-08-10 ENCOUNTER — OFFICE VISIT (OUTPATIENT)
Dept: FAMILY MEDICINE CLINIC | Facility: CLINIC | Age: 87
End: 2022-08-10
Payer: MEDICARE

## 2022-08-10 VITALS
SYSTOLIC BLOOD PRESSURE: 130 MMHG | HEART RATE: 80 BPM | RESPIRATION RATE: 18 BRPM | WEIGHT: 153.19 LBS | BODY MASS INDEX: 28.19 KG/M2 | DIASTOLIC BLOOD PRESSURE: 80 MMHG | HEIGHT: 62 IN

## 2022-08-10 DIAGNOSIS — R73.03 PREDIABETES: Primary | ICD-10-CM

## 2022-08-10 DIAGNOSIS — I10 ESSENTIAL HYPERTENSION: ICD-10-CM

## 2022-08-10 DIAGNOSIS — N18.4 CHRONIC KIDNEY DISEASE, STAGE 4 (SEVERE) (HCC): ICD-10-CM

## 2022-08-10 DIAGNOSIS — E78.2 MIXED HYPERLIPIDEMIA: ICD-10-CM

## 2022-08-10 DIAGNOSIS — E03.8 SUBCLINICAL HYPOTHYROIDISM: ICD-10-CM

## 2022-08-10 PROCEDURE — 99214 OFFICE O/P EST MOD 30 MIN: CPT | Performed by: FAMILY MEDICINE

## 2022-08-10 NOTE — TELEPHONE ENCOUNTER
Please enter lab orders for the patient's upcoming physical appointment. Physical scheduled: Your appointments     Date & Time Appointment Department [facility])    Feb 10, 2023  9:00 AM CST Medicare Annual Well Visit with MD [address], [address], [place]  ([address])            [address], [address], [place] [address] [address] [place] [address] [phone]         [facility])     The patient has been notified to complete fasting labs prior to their physical appointment.

## 2023-02-04 DIAGNOSIS — E78.5 HYPERLIPIDEMIA LDL GOAL <100: ICD-10-CM

## 2023-02-04 DIAGNOSIS — N39.41 URGE INCONTINENCE OF URINE: ICD-10-CM

## 2023-02-06 ENCOUNTER — TELEPHONE (OUTPATIENT)
Dept: FAMILY MEDICINE CLINIC | Facility: CLINIC | Age: 88
End: 2023-02-06

## 2023-02-08 ENCOUNTER — LAB ENCOUNTER (OUTPATIENT)
Dept: LAB | Age: 88
End: 2023-02-08
Attending: FAMILY MEDICINE
Payer: MEDICARE

## 2023-02-08 DIAGNOSIS — N18.4 CHRONIC KIDNEY DISEASE, STAGE 4 (SEVERE) (HCC): ICD-10-CM

## 2023-02-08 DIAGNOSIS — R73.03 PREDIABETES: ICD-10-CM

## 2023-02-08 DIAGNOSIS — E78.2 MIXED HYPERLIPIDEMIA: ICD-10-CM

## 2023-02-08 DIAGNOSIS — E03.8 SUBCLINICAL HYPOTHYROIDISM: ICD-10-CM

## 2023-02-08 DIAGNOSIS — Z00.00 LABORATORY EXAMINATION ORDERED AS PART OF A ROUTINE GENERAL MEDICAL EXAMINATION: ICD-10-CM

## 2023-02-08 DIAGNOSIS — R73.9 HYPERGLYCEMIA: ICD-10-CM

## 2023-02-08 LAB
ALBUMIN SERPL-MCNC: 3.4 G/DL (ref 3.4–5)
ALBUMIN/GLOB SERPL: 0.8 {RATIO} (ref 1–2)
ALP LIVER SERPL-CCNC: 138 U/L
ALT SERPL-CCNC: 17 U/L
ANION GAP SERPL CALC-SCNC: 2 MMOL/L (ref 0–18)
AST SERPL-CCNC: 19 U/L (ref 15–37)
BILIRUB SERPL-MCNC: 0.4 MG/DL (ref 0.1–2)
BUN BLD-MCNC: 28 MG/DL (ref 7–18)
CALCIUM BLD-MCNC: 10.5 MG/DL (ref 8.5–10.1)
CHLORIDE SERPL-SCNC: 104 MMOL/L (ref 98–112)
CHOLEST SERPL-MCNC: 155 MG/DL (ref ?–200)
CO2 SERPL-SCNC: 28 MMOL/L (ref 21–32)
CREAT BLD-MCNC: 1.65 MG/DL
ERYTHROCYTE [DISTWIDTH] IN BLOOD BY AUTOMATED COUNT: 11.9 %
EST. AVERAGE GLUCOSE BLD GHB EST-MCNC: 131 MG/DL (ref 68–126)
FASTING PATIENT LIPID ANSWER: YES
FASTING STATUS PATIENT QL REPORTED: YES
GFR SERPLBLD BASED ON 1.73 SQ M-ARVRAT: 29 ML/MIN/1.73M2 (ref 60–?)
GLOBULIN PLAS-MCNC: 4.4 G/DL (ref 2.8–4.4)
GLUCOSE BLD-MCNC: 104 MG/DL (ref 70–99)
HBA1C MFR BLD: 6.2 % (ref ?–5.7)
HCT VFR BLD AUTO: 42.9 %
HDLC SERPL-MCNC: 39 MG/DL (ref 40–59)
HGB BLD-MCNC: 13.7 G/DL
LDLC SERPL CALC-MCNC: 91 MG/DL (ref ?–100)
MCH RBC QN AUTO: 30.9 PG (ref 26–34)
MCHC RBC AUTO-ENTMCNC: 31.9 G/DL (ref 31–37)
MCV RBC AUTO: 96.6 FL
NONHDLC SERPL-MCNC: 116 MG/DL (ref ?–130)
OSMOLALITY SERPL CALC.SUM OF ELEC: 284 MOSM/KG (ref 275–295)
PLATELET # BLD AUTO: 306 10(3)UL (ref 150–450)
POTASSIUM SERPL-SCNC: 4.8 MMOL/L (ref 3.5–5.1)
PROT SERPL-MCNC: 7.8 G/DL (ref 6.4–8.2)
RBC # BLD AUTO: 4.44 X10(6)UL
SODIUM SERPL-SCNC: 134 MMOL/L (ref 136–145)
TRIGL SERPL-MCNC: 144 MG/DL (ref 30–149)
TSI SER-ACNC: 3.16 MIU/ML (ref 0.36–3.74)
VLDLC SERPL CALC-MCNC: 23 MG/DL (ref 0–30)
WBC # BLD AUTO: 7.2 X10(3) UL (ref 4–11)

## 2023-02-08 PROCEDURE — 36415 COLL VENOUS BLD VENIPUNCTURE: CPT

## 2023-02-08 PROCEDURE — 80061 LIPID PANEL: CPT

## 2023-02-08 PROCEDURE — 80053 COMPREHEN METABOLIC PANEL: CPT

## 2023-02-08 PROCEDURE — 85027 COMPLETE CBC AUTOMATED: CPT

## 2023-02-08 PROCEDURE — 84443 ASSAY THYROID STIM HORMONE: CPT

## 2023-02-08 PROCEDURE — 83036 HEMOGLOBIN GLYCOSYLATED A1C: CPT

## 2023-02-08 RX ORDER — TROSPIUM CHLORIDE ER 60 MG/1
CAPSULE ORAL
Qty: 90 CAPSULE | Refills: 3 | Status: SHIPPED | OUTPATIENT
Start: 2023-02-08

## 2023-02-08 RX ORDER — LOVASTATIN 20 MG/1
TABLET ORAL
Qty: 90 TABLET | Refills: 3 | Status: SHIPPED | OUTPATIENT
Start: 2023-02-08

## 2023-02-08 NOTE — TELEPHONE ENCOUNTER
LOV 08/10/2022 lay/ jorje prediabetes  MAW scheduled 02/10/2023  Lab completed today   Lovastatin refilled for one year  Forward Trospium to Dr Valentino Mayotte, not on protocol

## 2023-02-09 NOTE — ASSESSMENT & PLAN NOTE
Stable, Continue present management.     Thyroid  (most recent labs)   Lab Results   Component Value Date/Time    TSH 3.160 02/08/2023 08:59 AM    T4F 1.1 02/07/2022 09:04 AM

## 2023-02-09 NOTE — ASSESSMENT & PLAN NOTE
Lab Results   Component Value Date/Time    CREATSERUM 1.65 (H) 02/08/2023 08:59 AM    EGFRCR 29 (L) 02/08/2023 08:59 AM    GFR 49 (L) 11/06/2017 08:23 AM    GFRNAA 23 (L) 02/07/2022 09:04 AM      Stable, continue present management

## 2023-02-09 NOTE — ASSESSMENT & PLAN NOTE
As for her Pre-Diabetes, it is well controlled, no significant medication side effects noted. Recommendations are: continue present meds, lose weight by increased dietary compliance and exercise and will check labs as ordered.     Lab Results   Component Value Date    A1C 6.2 (H) 02/08/2023    A1C 5.9 (H) 02/07/2022

## 2023-02-10 ENCOUNTER — HOSPITAL ENCOUNTER (OUTPATIENT)
Dept: GENERAL RADIOLOGY | Age: 88
Discharge: HOME | End: 2023-02-10
Attending: FAMILY MEDICINE
Payer: MEDICARE

## 2023-02-10 ENCOUNTER — OFFICE VISIT (OUTPATIENT)
Dept: FAMILY MEDICINE CLINIC | Facility: CLINIC | Age: 88
End: 2023-02-10
Payer: MEDICARE

## 2023-02-10 VITALS
RESPIRATION RATE: 16 BRPM | BODY MASS INDEX: 28.27 KG/M2 | WEIGHT: 153.63 LBS | SYSTOLIC BLOOD PRESSURE: 134 MMHG | HEIGHT: 62 IN | DIASTOLIC BLOOD PRESSURE: 86 MMHG | HEART RATE: 78 BPM

## 2023-02-10 DIAGNOSIS — E03.8 SUBCLINICAL HYPOTHYROIDISM: ICD-10-CM

## 2023-02-10 DIAGNOSIS — R05.1 ACUTE COUGH: ICD-10-CM

## 2023-02-10 DIAGNOSIS — R73.03 PREDIABETES: ICD-10-CM

## 2023-02-10 DIAGNOSIS — Z00.00 ENCOUNTER FOR ANNUAL HEALTH EXAMINATION: ICD-10-CM

## 2023-02-10 DIAGNOSIS — I70.0 AORTIC ATHEROSCLEROSIS (HCC): ICD-10-CM

## 2023-02-10 DIAGNOSIS — N39.41 URGE INCONTINENCE OF URINE: ICD-10-CM

## 2023-02-10 DIAGNOSIS — N18.4 CHRONIC KIDNEY DISEASE, STAGE 4 (SEVERE) (HCC): ICD-10-CM

## 2023-02-10 DIAGNOSIS — M46.96 UNSPECIFIED INFLAMMATORY SPONDYLOPATHY, LUMBAR REGION (HCC): ICD-10-CM

## 2023-02-10 DIAGNOSIS — Z00.00 ANNUAL PHYSICAL EXAM: Primary | ICD-10-CM

## 2023-02-10 DIAGNOSIS — I10 ESSENTIAL HYPERTENSION: ICD-10-CM

## 2023-02-10 DIAGNOSIS — M48.062 SPINAL STENOSIS OF LUMBAR REGION WITH NEUROGENIC CLAUDICATION: ICD-10-CM

## 2023-02-10 DIAGNOSIS — E78.2 MIXED HYPERLIPIDEMIA: ICD-10-CM

## 2023-02-10 PROCEDURE — G0439 PPPS, SUBSEQ VISIT: HCPCS | Performed by: FAMILY MEDICINE

## 2023-02-10 PROCEDURE — 99214 OFFICE O/P EST MOD 30 MIN: CPT | Performed by: FAMILY MEDICINE

## 2023-02-10 PROCEDURE — 71046 X-RAY EXAM CHEST 2 VIEWS: CPT | Performed by: FAMILY MEDICINE

## 2023-02-10 RX ORDER — LOSARTAN POTASSIUM 25 MG/1
25 TABLET ORAL DAILY
Qty: 90 TABLET | Refills: 3 | Status: SHIPPED | OUTPATIENT
Start: 2023-02-10

## 2023-02-16 ENCOUNTER — TELEPHONE (OUTPATIENT)
Dept: FAMILY MEDICINE CLINIC | Facility: CLINIC | Age: 88
End: 2023-02-16

## 2023-02-16 RX ORDER — AZITHROMYCIN 250 MG/1
TABLET, FILM COATED ORAL
Qty: 6 TABLET | Refills: 0 | Status: SHIPPED | OUTPATIENT
Start: 2023-02-16 | End: 2023-02-21

## 2023-02-16 NOTE — TELEPHONE ENCOUNTER
Results reviewed with pt. She states cough is unchanged. She asks if there's anything she can take to get rid of it?     Routed to Dr. Laurita Aldrich

## 2023-02-20 ENCOUNTER — TELEPHONE (OUTPATIENT)
Dept: FAMILY MEDICINE CLINIC | Facility: CLINIC | Age: 88
End: 2023-02-20

## 2023-02-20 DIAGNOSIS — N39.41 URGE INCONTINENCE OF URINE: ICD-10-CM

## 2023-02-20 NOTE — TELEPHONE ENCOUNTER
This was started by the [place] [facility] approximately 3 years ago. I have only been refilling it for her. Currently her insurance is saying that it is covered. Can we get a list of meds that would be alternatives for her as I will not play the insurance's game if they cannot send the correct information to us. I will be happy to switch it to something different once I know what my options are and I will not randomly switch to a different one.

## 2023-02-20 NOTE — TELEPHONE ENCOUNTER
She is calling she received a letter saying her insurance does not cover TROSPIUM CHLORIDE ER 60 MG Oral Capsule SR 24 Hr she would like to know what else she can take

## 2023-02-21 RX ORDER — MIRABEGRON 25 MG/1
25 TABLET, FILM COATED, EXTENDED RELEASE ORAL DAILY
Qty: 90 TABLET | Refills: 4 | Status: SHIPPED | OUTPATIENT
Start: 2023-02-21

## 2023-02-21 NOTE — TELEPHONE ENCOUNTER
From 2023 formulary  Genitourinary Agents  alfuzosin hcl tab er 24hr 10 mg 2 QL (30 tablets/30 days)  bethanechol chloride tab 5 mg, 10 mg, 25 mg, 50 mg 3  dutasteride cap 0.5 mg 2 QL (30 capsules/30 days)  finasteride tab 5 mg 2 QL (30 tablets/30 days)  MYRBETRIQ - mirabegron granules for oral extended release susp 8 mg/ml 3 QL (3 bottles/28 days)  MYRBETRIQ - mirabegron tab er 24 hr 25 mg, 50 mg 3 QL (30 tablets/30 days)  oxybutynin chloride syrup 5 mg/5ml 4 QL (600 mls/30 days)  oxybutynin chloride tab er 24hr 5 mg 3 QL (30 tablets/30 days)  oxybutynin chloride tab er 24hr 10 mg 3 QL (90 tablets/30 days)  oxybutynin chloride tab er 24hr 15 mg 3 QL (60 tablets/30 days)  oxybutynin chloride tab 5 mg 2 QL (120 tablets/30 days)  penicillamine tab 250 mg 5  tamsulosin hcl cap 0.4 mg 2 QL (60 capsules/30 days)  tolterodine tartrate cap er 24hr 2 mg, 4 mg 4 QL (30 capsules/30 days)

## 2023-02-21 NOTE — TELEPHONE ENCOUNTER
Marcelle Franklin, since this medication was initially started by the [facility] group could patient be referred back to them they might have another medication she could take instead.

## 2023-05-10 ENCOUNTER — TELEPHONE (OUTPATIENT)
Dept: FAMILY MEDICINE CLINIC | Facility: CLINIC | Age: 88
End: 2023-05-10

## 2023-05-10 NOTE — TELEPHONE ENCOUNTER
Pt call because have a medication question , pt request speak to Leola Aguilar , pt don't know the name of the med but she said Leola Aguilar know. Please call her back.

## 2023-08-15 NOTE — ASSESSMENT & PLAN NOTE
Last K was 4.8 done on 2/8/2023. Last Cr was 1.65 done on 2/8/2023. Hypertension meds include losartan 25 MG Oral Tab [[id number]].

## 2023-08-15 NOTE — ASSESSMENT & PLAN NOTE
Last A1c value was 6.2% done 2/8/2023. Sugar control is well controlled, no significant medication side effects noted, Pre-Diabetic. Not currently on Diabetic meds.

## 2023-08-16 ENCOUNTER — OFFICE VISIT (OUTPATIENT)
Dept: FAMILY MEDICINE CLINIC | Facility: CLINIC | Age: 88
End: 2023-08-16
Payer: MEDICARE

## 2023-08-16 VITALS
DIASTOLIC BLOOD PRESSURE: 62 MMHG | SYSTOLIC BLOOD PRESSURE: 128 MMHG | HEART RATE: 84 BPM | HEIGHT: 62 IN | WEIGHT: 150.19 LBS | BODY MASS INDEX: 27.64 KG/M2 | RESPIRATION RATE: 14 BRPM

## 2023-08-16 DIAGNOSIS — N18.4 CHRONIC KIDNEY DISEASE, STAGE 4 (SEVERE) (HCC): ICD-10-CM

## 2023-08-16 DIAGNOSIS — E03.8 SUBCLINICAL HYPOTHYROIDISM: ICD-10-CM

## 2023-08-16 DIAGNOSIS — N39.41 URGE INCONTINENCE OF URINE: ICD-10-CM

## 2023-08-16 DIAGNOSIS — I10 ESSENTIAL HYPERTENSION: ICD-10-CM

## 2023-08-16 DIAGNOSIS — E78.2 MIXED HYPERLIPIDEMIA: ICD-10-CM

## 2023-08-16 DIAGNOSIS — R73.03 PREDIABETES: Primary | ICD-10-CM

## 2023-08-16 PROCEDURE — 99214 OFFICE O/P EST MOD 30 MIN: CPT | Performed by: FAMILY MEDICINE

## 2023-08-16 RX ORDER — PREDNISOLONE ACETATE 10 MG/ML
SUSPENSION/ DROPS OPHTHALMIC
COMMUNITY

## 2023-08-16 RX ORDER — MIRABEGRON 50 MG/1
50 TABLET, FILM COATED, EXTENDED RELEASE ORAL DAILY
Qty: 90 TABLET | Refills: 1 | Status: SHIPPED | OUTPATIENT
Start: 2023-08-16

## 2023-08-16 RX ORDER — CYCLOPENTOLATE HYDROCHLORIDE 10 MG/ML
1 SOLUTION/ DROPS OPHTHALMIC 4 TIMES DAILY
COMMUNITY

## 2023-12-10 ENCOUNTER — HOSPITAL ENCOUNTER (EMERGENCY)
Age: 88
Discharge: HOME | End: 2023-12-10
Payer: MEDICARE

## 2023-12-10 VITALS
WEIGHT: 145 LBS | HEART RATE: 69 BPM | BODY MASS INDEX: 25.69 KG/M2 | HEIGHT: 63 IN | RESPIRATION RATE: 16 BRPM | SYSTOLIC BLOOD PRESSURE: 146 MMHG | TEMPERATURE: 98 F | OXYGEN SATURATION: 93 % | DIASTOLIC BLOOD PRESSURE: 71 MMHG

## 2023-12-10 DIAGNOSIS — H11.31 SUBCONJUNCTIVAL HEMORRHAGE OF RIGHT EYE: Primary | ICD-10-CM

## 2023-12-10 PROCEDURE — 99283 EMERGENCY DEPT VISIT LOW MDM: CPT

## 2023-12-10 PROCEDURE — 99282 EMERGENCY DEPT VISIT SF MDM: CPT

## 2023-12-10 RX ORDER — DORZOLAMIDE HYDROCHLORIDE AND TIMOLOL MALEATE 20; 5 MG/ML; MG/ML
1 SOLUTION/ DROPS OPHTHALMIC 2 TIMES DAILY
COMMUNITY

## 2023-12-10 NOTE — DISCHARGE INSTRUCTIONS
Follow-up with your primary care physician for all of your healthcare needs  Increase fluids keep hydrated  Return to the emergency room if your symptoms or concerns

## 2024-01-30 DIAGNOSIS — E78.5 HYPERLIPIDEMIA LDL GOAL <100: ICD-10-CM

## 2024-01-30 RX ORDER — LOVASTATIN 20 MG/1
TABLET ORAL
Qty: 90 TABLET | Refills: 0 | Status: SHIPPED | OUTPATIENT
Start: 2024-01-30

## 2024-01-30 NOTE — TELEPHONE ENCOUNTER
Requested Prescriptions     Pending Prescriptions Disp Refills    LOVASTATIN 20 MG Oral Tab [Pharmacy Med Name: LOVASTATIN 20MG TABLETS] 90 tablet 3     Sig: TAKE 1 TABLET(20 MG) BY MOUTH EVERY EVENING     LOV 8/16/2023     Patient was asked to follow-up in: 6 months    Appointment scheduled: 2/26/2024 Camilo Perez MD     Medication refilled per protocol.

## 2024-02-08 DIAGNOSIS — I10 ESSENTIAL HYPERTENSION: ICD-10-CM

## 2024-02-08 RX ORDER — LOSARTAN POTASSIUM 25 MG/1
25 TABLET ORAL DAILY
Qty: 90 TABLET | Refills: 0 | Status: SHIPPED | OUTPATIENT
Start: 2024-02-08

## 2024-02-08 NOTE — TELEPHONE ENCOUNTER
Requested Prescriptions     Pending Prescriptions Disp Refills    LOSARTAN 25 MG Oral Tab [Pharmacy Med Name: LOSARTAN 25MG TABLETS] 90 tablet 3     Sig: TAKE 1 TABLET(25 MG) BY MOUTH DAILY     LOV 8/16/2023     Patient was asked to follow-up in: 6 months    Appointment scheduled: 2/26/2024 Camilo Perez MD     Medication refilled per protocol.

## 2024-02-14 RX ORDER — MIRABEGRON 50 MG/1
50 TABLET, FILM COATED, EXTENDED RELEASE ORAL DAILY
Qty: 90 TABLET | Refills: 3 | Status: SHIPPED | OUTPATIENT
Start: 2024-02-14

## 2024-02-14 NOTE — TELEPHONE ENCOUNTER
Refill request for:    Requested Prescriptions     Pending Prescriptions Disp Refills    MYRBETRIQ 50 MG Oral Tablet 24 Hr [Pharmacy Med Name: MYRBETRIQ 50MG TABLETS] 90 tablet 1     Sig: TAKE 1 TABLET(50 MG) BY MOUTH DAILY        LOV 8/16/2023     Patient was asked to follow-up in: 6 months    Appointment due: February 2024    Appointment scheduled: 2/26/2024 Camilo Perez MD    Medication not on protocol.     # 90 with 3 refills routed to Camilo Perez MD for review

## 2024-02-24 NOTE — ASSESSMENT & PLAN NOTE
Cholesterol shows Good control. Long term heart-healthy diet and lifestyle discussed and encouraged to reduce risk of cardiovascular disease.  Cholesterol: 155, done on 2/8/2023.  HDL Cholesterol: 39, done on 2/8/2023.  TriGlycerides 144, done on 2/8/2023.  LDL Cholesterol: 91, done on 2/8/2023.   Cholesterol medications include Lovastatin 20 MG Oral Tab [[id number]], Lovastatin 20 MG Oral Tab [[id number]] (Long-Term Med).

## 2024-02-24 NOTE — ASSESSMENT & PLAN NOTE
2/8/2023: eGFR-Cr 29 (L); Creatinine 1.65 (H) stable, continue present management with labs 2x yearly.

## 2024-02-24 NOTE — ASSESSMENT & PLAN NOTE
BP shows poor control with last BP of 146/71. Lifestyle changes, diet, exercise and weight loss were counseled. Medication changes as listed.   Last K was 4.8 done on 2/8/2023.  Last Cr was 1.65 done on 2/8/2023.  Last eGFR was 29 on 2/8/2023.  BP Meds: losartan Tabs - 25 MG

## 2024-02-26 ENCOUNTER — OFFICE VISIT (OUTPATIENT)
Dept: FAMILY MEDICINE CLINIC | Facility: CLINIC | Age: 89
End: 2024-02-26
Payer: MEDICARE

## 2024-02-26 VITALS
BODY MASS INDEX: 25.73 KG/M2 | DIASTOLIC BLOOD PRESSURE: 72 MMHG | HEIGHT: 63 IN | RESPIRATION RATE: 16 BRPM | WEIGHT: 145.19 LBS | HEART RATE: 78 BPM | SYSTOLIC BLOOD PRESSURE: 124 MMHG

## 2024-02-26 DIAGNOSIS — I70.0 AORTIC ATHEROSCLEROSIS (HCC): ICD-10-CM

## 2024-02-26 DIAGNOSIS — M46.96 UNSPECIFIED INFLAMMATORY SPONDYLOPATHY, LUMBAR REGION (HCC): ICD-10-CM

## 2024-02-26 DIAGNOSIS — E03.8 SUBCLINICAL HYPOTHYROIDISM: ICD-10-CM

## 2024-02-26 DIAGNOSIS — E78.2 MIXED HYPERLIPIDEMIA: ICD-10-CM

## 2024-02-26 DIAGNOSIS — M48.062 SPINAL STENOSIS OF LUMBAR REGION WITH NEUROGENIC CLAUDICATION: ICD-10-CM

## 2024-02-26 DIAGNOSIS — Z13.0 SCREENING FOR IRON DEFICIENCY ANEMIA: ICD-10-CM

## 2024-02-26 DIAGNOSIS — Z13.1 SCREENING FOR DIABETES MELLITUS: ICD-10-CM

## 2024-02-26 DIAGNOSIS — R73.03 PREDIABETES: ICD-10-CM

## 2024-02-26 DIAGNOSIS — N39.41 URGE INCONTINENCE OF URINE: ICD-10-CM

## 2024-02-26 DIAGNOSIS — Z00.00 ENCOUNTER FOR ANNUAL HEALTH EXAMINATION: ICD-10-CM

## 2024-02-26 DIAGNOSIS — I10 ESSENTIAL HYPERTENSION: ICD-10-CM

## 2024-02-26 DIAGNOSIS — N18.4 CHRONIC KIDNEY DISEASE, STAGE 4 (SEVERE) (HCC): ICD-10-CM

## 2024-02-26 DIAGNOSIS — Z00.00 ANNUAL PHYSICAL EXAM: Primary | ICD-10-CM

## 2024-02-26 RX ORDER — ATROPINE SULFATE 10 MG/ML
1 SOLUTION/ DROPS OPHTHALMIC 4 TIMES DAILY
COMMUNITY
Start: 2024-01-02

## 2024-02-26 NOTE — PATIENT INSTRUCTIONS
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   Please check with your insurance carrier before scheduling to verify coverage.   PREVENTATIVE SERVICES FREQUENCY &  COVERAGE DETAILS LAST COMPLETION DATE   Diabetes Screening    Fasting Blood Sugar /  Glucose    One screening every 12 months if never tested or if previously tested but not diagnosed with pre-diabetes   One screening every 6 months if diagnosed with pre-diabetes Lab Results   Component Value Date     (H) 02/08/2023        Cardiovascular Disease Screening    Lipid Panel  Cholesterol  Lipoprotein (HDL)  Triglycerides Covered every 5 years for all Medicare beneficiaries without apparent signs or symptoms of cardiovascular disease Lab Results   Component Value Date    CHOLEST 155 02/08/2023    HDL 39 (L) 02/08/2023    LDL 91 02/08/2023    TRIG 144 02/08/2023         Electrocardiogram (EKG)   Covered if needed at Welcome to Medicare, and non-screening if indicated for medical reasons 08/28/2015      Ultrasound Screening for Abdominal Aortic Aneurysm (AAA) Covered once in a lifetime for one of the following risk factors   • Men who are 65-75 years old and have ever smoked   • Anyone with a family history -     Colorectal Cancer Screening  Covered for ages 50-85; only need ONE of the following:    Colonoscopy   Covered every 10 years    Covered every 2 years if patient is at high risk or previous colonoscopy was abnormal -    No recommendations at this time    Flexible Sigmoidoscopy   Covered every 4 years -    Fecal Occult Blood Test Covered annually -   Bone Density Screening    Bone density screening    Covered every 2 years after age 65 if diagnosed with risk of osteoporosis or estrogen deficiency.    Covered yearly for long-term  glucocorticoid medication use (Steroids) Last Dexa Scan:    XR DEXA BONE DENSITOMETRY (CPT=77080) 05/18/2017      No recommendations at this time   Pap and Pelvic    Pap   Covered every 2 years for women at normal risk; Annually if at high risk -  No recommendations at this time    Chlamydia Annually if high risk -  No recommendations at this time   Screening Mammogram    Mammogram     Recommend annually for all female patients aged 40 and older    One baseline mammogram covered for patients aged 35-39 -    No recommendations at this time    Immunizations    Influenza Covered once per flu season  Please get every year 11/06/2023  No recommendations at this time    Pneumococcal Each vaccine ([id number] & Acyqngnpf24) covered once after 65 Prevnar 13: 09/25/2015    Tatffatva84: 09/10/2008     No recommendations at this time    Hepatitis B One screening covered for patients with certain risk factors   -  No recommendations at this time    Tetanus Toxoid Not covered by Medicare Part B unless medically necessary (cut with metal); may be covered with your pharmacy prescription benefits 06/09/2021    Tetanus, Diptheria and Pertusis TD and TDaP Not covered by Medicare Part B -  No recommendations at this time    Zoster Not covered by Medicare Part B; may be covered with your pharmacy  prescription benefits 02/26/2014  No recommendations at this time     Annual Monitoring of Persistent Medications (ACE/ARB, digoxin diuretics, anticonvulsants)    Potassium Annually Lab Results   Component Value Date    K 4.8 02/08/2023         Creatinine   Annually Lab Results   Component Value Date    CREATSERUM 1.65 (H) 02/08/2023         BUN Annually Lab Results   Component Value Date    BUN 28 (H) 02/08/2023       Drug Serum Conc Annually No results found for: \"DIGOXIN\", \"DIG\", \"VALP\"

## 2024-02-26 NOTE — PROGRESS NOTES
Subjective:   Clary Quinones is a 89 year old female who presents for a MA (Medicare Advantage) Supervisit (Once per calendar year) and scheduled follow up of multiple significant but stable problems. Vision worse, dealing with glaucoma and seeing not as well, holding onto somone aor something. Was some blood befind eyes and that dod not hel p either. Walker at home.   Just refilled losartan, lovastatin and myrbetq, incontinence is still an issue as well,     Still incontinence issues.   History/Other:   Fall Risk Assessment:   She has been screened for Falls and is High Risk. Fall Prevention information provided to patient in After Visit Summary.    Do you feel unsteady when standing or walking?: Yes  Do you worry about falling?: Yes  Have you fallen in the past year?: Yes  How many times have you fallen?: 1  Were you injured?: Yes     Cognitive Assessment:   She had a completely normal cognitive assessment - see flowsheet entries     Functional Ability/Status:   Clary Quinones has some abnormal functions as listed below:  She has Driving difficulties based on screening of functional status. She has Hearing problems based on screening of functional status.She has Vision problems based on screening of functional status. She has Walking problems based on screening of functional status.       Depression Screening (PHQ-2/PHQ-9): PHQ-2 SCORE: 0  , done 2/26/2024   If you checked off any problems, how difficult have these problems made it for you to do your work, take care of things at home, or get along with other people?: Somewhat difficult         Advanced Directives:   She has a Living Will on file in [facility]; reviewed and discussed documents with patient (and family/surrogate if present).  She has a Power of  for Health Care on file in [facility].  Discussed Advance Care Planning with patient (and family/surrogate if present). Standard forms made available to patient in After Visit Summary.      Patient  Active Problem List   Diagnosis    Urinary incontinence    Essential hypertension    Mixed hyperlipidemia    Prediabetes    Unspecified inflammatory spondylopathy, lumbar region (HCC)    Chronic kidney disease, stage 4 (severe) ([facility])    Aortic atherosclerosis ([facility])    Spinal stenosis of lumbar region with neurogenic claudication    Subclinical hypothyroidism     Allergies:  She is allergic to latex, penicillins, polysporin [bacitracin-polymyxin b], and adhesive tape.    Current Medications:  Outpatient Medications Marked as Taking for the 2/26/24 encounter (Office Visit) with Camilo Perez MD   Medication Sig    atropine 1 % Ophthalmic Solution 1 drop 4 (four) times daily.    Mirabegron ER (MYRBETRIQ) 50 MG Oral Tablet 24 Hr Take 1 tablet (50 mg total) by mouth daily.    losartan 25 MG Oral Tab TAKE 1 TABLET(25 MG) BY MOUTH DAILY    Lovastatin 20 MG Oral Tab TAKE 1 TABLET(20 MG) BY MOUTH EVERY EVENING    dorzolamide-timolol 2-0.5 % Ophthalmic Solution Place 1 drop into both eyes 2 (two) times daily.    cyclopentolate 1 % Ophthalmic Solution Place 1 drop into both eyes 4 (four) times daily.    prednisoLONE 1 % Ophthalmic Suspension SHAKE LIQUID AND INSTILL 1 DROP IN BOTH EYES EVERY 4 HOURS    Calcium Carbonate Antacid 600 MG Oral Chew Tab Chew 600 mg by mouth.    PEG 3350 Oral Powd Pack Take 17 g by mouth daily.    Diclofenac Sodium (VOLTAREN) 1 % Transdermal Gel Apply 4 g topically daily as needed.    Probiotic Product (PROBIOTIC-10 OR) Take by mouth.    Niacin 500 MG Oral Tab Take 1 tablet (500 mg total) by mouth daily with breakfast.    Omega-3 Fatty Acids (FISH OIL) 1000 MG Oral Cap Take 1,000 mg by mouth daily.    Multiple Vitamins-Minerals (MULTIVITAMIN & MINERAL OR) Take 1 tablet by mouth daily.       Medical History:  She  has a past medical history of Back pain, Back problem, Glaucoma, and actinic keratosis (03/29/2017).  Surgical History:  She  has a past surgical history that includes appendectomy (1950);  cataract surgery, complex (2005); colonoscopy (05/1998 03/2004); hysterectomy (1976); appendectomy; diagnostic anoscopy; cataract (2005); and colonoscopy (4/2/2014).   Family History:  Her family history includes Cancer in her brother and father; Heart Attack in her brother; Stroke in her mother and sister.  Social History:  She  reports that she has never smoked. She has never used smokeless tobacco. She reports that she does not currently use alcohol. She reports that she does not use drugs.    Tobacco:  She has never smoked tobacco.    CAGE Alcohol Screen:   CAGE screening score of 0 on 2/26/2024, showing low risk of alcohol abuse.      Patient Care Team:  Camilo Perez MD as PCP - General (Family Practice)  Brayan Berry (OPHTHALMOLOGY)  Rafael Bernal (DERMATOLOGY)  Morgan Taylor MD as Consulting Physician (NEPHROLOGY)    Review of Systems   Constitutional: Negative.  Negative for activity change, appetite change, chills and fever.   HENT: Negative.     Eyes: Negative.    Respiratory: Negative.  Negative for shortness of breath.    Cardiovascular: Negative.  Negative for chest pain and palpitations.   Gastrointestinal: Negative.  Negative for abdominal pain.   Genitourinary: Negative.  Negative for dysuria.   Musculoskeletal:  Negative for arthralgias.   Skin: Negative.  Negative for rash.   Allergic/Immunologic: Negative.    Neurological: Negative.        Objective:   Physical Exam  Vitals and nursing note reviewed.   Constitutional:       General: She is not in acute distress.     Appearance: Normal appearance.   HENT:      Head: Normocephalic and atraumatic.      Right Ear: Tympanic membrane and external ear normal.      Left Ear: Tympanic membrane and external ear normal.      Nose: Nose normal.      Mouth/Throat:      Mouth: Mucous membranes are moist.   Eyes:      Extraocular Movements: Extraocular movements intact.      Pupils: Pupils are equal, round, and reactive to light.   Cardiovascular:       Rate and Rhythm: Normal rate and regular rhythm.      Pulses: Normal pulses.           Carotid pulses are 2+ on the right side and 2+ on the left side.       Radial pulses are 2+ on the right side and 2+ on the left side.        Dorsalis pedis pulses are 2+ on the right side and 2+ on the left side.        Posterior tibial pulses are 2+ on the right side and 2+ on the left side.      Heart sounds: Normal heart sounds, S1 normal and S2 normal. No murmur heard.  Pulmonary:      Effort: Pulmonary effort is normal. No respiratory distress.      Breath sounds: Normal breath sounds. No wheezing.   Abdominal:      General: Abdomen is flat. Bowel sounds are normal. There is no distension.      Palpations: Abdomen is soft.      Tenderness: There is no abdominal tenderness.   Musculoskeletal:         General: Normal range of motion.      Cervical back: Normal range of motion and neck supple.      Right lower leg: No edema.      Left lower leg: No edema.   Skin:     General: Skin is warm and dry.      Capillary Refill: Capillary refill takes less than 2 seconds.      Findings: No rash.   Neurological:      General: No focal deficit present.      Mental Status: She is alert and oriented to person, place, and time.   Psychiatric:         Mood and Affect: Mood normal.         Behavior: Behavior normal.         Thought Content: Thought content normal.         Judgment: Judgment normal.         /72   Pulse 78   Resp 16   Ht 5' 3\" (1.6 m)   Wt 145 lb 3.2 oz (65.9 kg)   BMI 25.72 kg/m²  Estimated body mass index is 25.72 kg/m² as calculated from the following:    Height as of this encounter: 5' 3\" (1.6 m).    Weight as of this encounter: 145 lb 3.2 oz (65.9 kg).    Medicare Hearing Assessment:   Hearing Screening    Screening Method: Whisper Test  Whisper Test Result: Fail               Visual Acuity:   Right Eye Visual Acuity: Uncorrected Right Eye Chart Acuity: 20/70   Left Eye Visual Acuity: Uncorrected Left Eye Chart  Acuity: 20/70   Both Eyes Visual Acuity: Uncorrected Both Eyes Chart Acuity: 20/70   Able To Tolerate Visual Acuity: Yes        Assessment & Plan:   Clary Quinones is a 89 year old female who presents for a Medicare Assessment.     1. Annual physical exam (Primary)  2. Essential hypertension  Overview:  lisinoril 20 until 6/2015, then off with /80. Switched to losartan 25 2/10/2023 because of elevation  Assessment & Plan:  BP shows poor control with last BP of 146/71. Lifestyle changes, diet, exercise and weight loss were counseled. Medication changes as listed.   Last K was 4.8 done on 2/8/2023.  Last Cr was 1.65 done on 2/8/2023.  Last eGFR was 29 on 2/8/2023.  BP Meds: losartan Tabs - 25 MG    Orders:  -     TSH W Reflex To Free T4; Future; Expected date: 02/26/2024  -     CBC With Differential With Platelet; Future; Expected date: 02/26/2024  -     Comp Metabolic Panel (14); Future; Expected date: 02/26/2024  -     Detailed, Mod Complex (22870)  3. Chronic kidney disease, stage 4 (severe) ([facility])  Overview:  GFR 46 normally but 29 in 2020, due to HTN, lisinopril 10  Assessment & Plan:  2/8/2023: eGFR-Cr 29 (L); Creatinine 1.65 (H) stable, continue present management with labs 2x yearly.   Orders:  -     CBC With Differential With Platelet; Future; Expected date: 02/26/2024  -     Comp Metabolic Panel (14); Future; Expected date: 02/26/2024  -     Detailed, Mod Complex (76571)  4. Unspecified inflammatory spondylopathy, lumbar region ([facility])  Overview:  Seen on multiple Xr spine,  Mild sacroiliac arthritis, worse on the right- In past, T#3 or Norco 5 prn. Now voltaren Gel  Assessment & Plan:  Stable pain , continue to monitor pain leel and continue to monitor activity levels.   5. Spinal stenosis of lumbar region with neurogenic claudication  Overview:  Dr pan managing  Assessment & Plan:  Stable pain, continue to monitor effective control   6. Aortic atherosclerosis ([facility])  Overview:  Seen on 2011 L  spine Xray, \"Arterial calcifications are present. No direct demonstration of an abdominal aortic aneurysm.\" lovastatin 20  Assessment & Plan:  Stable function. Lovastatin continued, reassess in 6 months.   7. Mixed hyperlipidemia  Overview:  Lovastatin 20, niacin 500  Assessment & Plan:  Cholesterol shows Good control. Long term heart-healthy diet and lifestyle discussed and encouraged to reduce risk of cardiovascular disease.  Cholesterol: 155, done on 2/8/2023.  HDL Cholesterol: 39, done on 2/8/2023.  TriGlycerides 144, done on 2/8/2023.  LDL Cholesterol: 91, done on 2/8/2023.   Cholesterol medications include Lovastatin 20 MG Oral Tab [[id number]], Lovastatin 20 MG Oral Tab [[id number]] (Long-Term Med).     Orders:  -     TSH W Reflex To Free T4; Future; Expected date: 02/26/2024  -     CBC With Differential With Platelet; Future; Expected date: 02/26/2024  -     Comp Metabolic Panel (14); Future; Expected date: 02/26/2024  8. Subclinical hypothyroidism  Overview:  TSH in 3's    Assessment & Plan:  Thyroid shows Good control.   TSH: 3.16, done on 2/8/2023.  FT4: 1.1, done on 2/7/2022.       Orders:  -     Detailed, Mod Complex (14114)  9. Prediabetes  Overview:  A1c 6.0% 10/2014  Assessment & Plan:  Last A1c value was 6.2% done 2/8/2023.    Sugar control is well controlled, no significant medication side effects noted, Pre-Diabetic.  Not currently on Diabetic meds.        Orders:  -     Hemoglobin A1C; Future; Expected date: 02/26/2024  -     Detailed, Mod Complex (55591)  10. Urge incontinence of urine  Overview:  Stopped Vesicare 5,  Oxybutynin XL 5 2016. Myrtbiq too expensive  11. Screening for diabetes mellitus  -     Comp Metabolic Panel (14); Future; Expected date: 02/26/2024  12. Screening for iron deficiency anemia  -     CBC With Differential With Platelet; Future; Expected date: 02/26/2024  13. Encounter for annual health examination    The patient indicates understanding of these issues and agrees to the  plan.  Reinforced healthy diet, lifestyle, and exercise.    Doing well but discussed possible PT/HHC if no change.   Stable function. Reassess 6 months.       Return in about 6 months (around 8/26/2024) for recheck.     Camilo Perez MD, 2/26/2024     Supplementary Documentation:   General Health:  In the past six months, have you lost more than 10 pounds without trying?: 2 - No  Has your appetite been poor?: No  Type of Diet: Balanced  How does the patient maintain a good energy level?: Daily Walks  How would you describe your daily physical activity?: Light  How would you describe your current health state?: Fair  How do you maintain positive mental well-being?: Puzzles;Visiting Friends  On a scale of 0 to 10, with 0 being no pain and 10 being severe pain, what is your pain level?: 1 - (Mild)  In the past six months, have you experienced urine leakage?: 1-Yes  At any time do you feel concerned for the safety/well-being of yourself and/or your children, in your home or elsewhere?: Yes  Have you had any immunizations at another office such as Influenza, Hepatitis B, Tetanus, or Pneumococcal?: No       Clary Quinones's SCREENING SCHEDULE   Tests on this list are recommended by your physician but may not be covered, or covered at this frequency, by your insurer.   Please check with your insurance carrier before scheduling to verify coverage.   PREVENTATIVE SERVICES FREQUENCY &  COVERAGE DETAILS LAST COMPLETION DATE   Diabetes Screening    Fasting Blood Sugar /  Glucose    One screening every 12 months if never tested or if previously tested but not diagnosed with pre-diabetes   One screening every 6 months if diagnosed with pre-diabetes Lab Results   Component Value Date     (H) 02/08/2023        Cardiovascular Disease Screening    Lipid Panel  Cholesterol  Lipoprotein (HDL)  Triglycerides Covered every 5 years for all Medicare beneficiaries without apparent signs or symptoms of cardiovascular disease Lab  Results   Component Value Date    CHOLEST 155 02/08/2023    HDL 39 (L) 02/08/2023    LDL 91 02/08/2023    TRIG 144 02/08/2023         Electrocardiogram (EKG)   Covered if needed at Welcome to Medicare, and non-screening if indicated for medical reasons 08/28/2015      Ultrasound Screening for Abdominal Aortic Aneurysm (AAA) Covered once in a lifetime for one of the following risk factors    Men who are 65-75 years old and have ever smoked    Anyone with a family history -     Colorectal Cancer Screening  Covered for ages 50-85; only need ONE of the following:    Colonoscopy   Covered every 10 years    Covered every 2 years if patient is at high risk or previous colonoscopy was abnormal -    No recommendations at this time    Flexible Sigmoidoscopy   Covered every 4 years -    Fecal Occult Blood Test Covered annually -   Bone Density Screening    Bone density screening    Covered every 2 years after age 65 if diagnosed with risk of osteoporosis or estrogen deficiency.    Covered yearly for long-term glucocorticoid medication use (Steroids) Last Dexa Scan:    XR DEXA BONE DENSITOMETRY (CPT=77080) 05/18/2017      No recommendations at this time   Pap and Pelvic    Pap   Covered every 2 years for women at normal risk; Annually if at high risk -  No recommendations at this time    Chlamydia Annually if high risk -  No recommendations at this time   Screening Mammogram    Mammogram     Recommend annually for all female patients aged 40 and older    One baseline mammogram covered for patients aged 35-39 -    No recommendations at this time    Immunizations    Influenza Covered once per flu season  Please get every year 11/06/2023  No recommendations at this time    Pneumococcal Each vaccine ([id number] & [id number]) covered once after 65 Prevnar 13: 09/25/2015    Znulzgken85: 09/10/2008     No recommendations at this time    Hepatitis B One screening covered for patients with certain risk factors   -  No recommendations at  this time    Tetanus Toxoid Not covered by Medicare Part B unless medically necessary (cut with metal); may be covered with your pharmacy prescription benefits 06/09/2021    Tetanus, Diptheria and Pertusis TD and TDaP Not covered by Medicare Part B -  No recommendations at this time    Zoster Not covered by Medicare Part B; may be covered with your pharmacy  prescription benefits 02/26/2014  No recommendations at this time     Annual Monitoring of Persistent Medications (ACE/ARB, digoxin diuretics, anticonvulsants)    Potassium Annually Lab Results   Component Value Date    K 4.8 02/08/2023         Creatinine   Annually Lab Results   Component Value Date    CREATSERUM 1.65 (H) 02/08/2023         BUN Annually Lab Results   Component Value Date    BUN 28 (H) 02/08/2023       Drug Serum Conc Annually No results found for: \"DIGOXIN\", \"DIG\", \"VALP\"

## 2024-03-21 ENCOUNTER — LAB ENCOUNTER (OUTPATIENT)
Dept: LAB | Age: 89
End: 2024-03-21
Attending: FAMILY MEDICINE
Payer: MEDICARE

## 2024-03-21 DIAGNOSIS — I10 ESSENTIAL HYPERTENSION: ICD-10-CM

## 2024-03-21 DIAGNOSIS — Z13.1 SCREENING FOR DIABETES MELLITUS: ICD-10-CM

## 2024-03-21 DIAGNOSIS — E78.2 MIXED HYPERLIPIDEMIA: ICD-10-CM

## 2024-03-21 DIAGNOSIS — Z13.0 SCREENING FOR IRON DEFICIENCY ANEMIA: ICD-10-CM

## 2024-03-21 DIAGNOSIS — N18.4 CHRONIC KIDNEY DISEASE, STAGE 4 (SEVERE) (HCC): ICD-10-CM

## 2024-03-21 DIAGNOSIS — R73.03 PREDIABETES: ICD-10-CM

## 2024-03-21 LAB
ALBUMIN SERPL-MCNC: 3.6 G/DL (ref 3.4–5)
ALBUMIN/GLOB SERPL: 1.1 {RATIO} (ref 1–2)
ALP LIVER SERPL-CCNC: 97 U/L
ALT SERPL-CCNC: 18 U/L
ANION GAP SERPL CALC-SCNC: 3 MMOL/L (ref 0–18)
AST SERPL-CCNC: 18 U/L (ref 15–37)
BASOPHILS # BLD AUTO: 0.03 X10(3) UL (ref 0–0.2)
BASOPHILS NFR BLD AUTO: 0.5 %
BILIRUB SERPL-MCNC: 0.4 MG/DL (ref 0.1–2)
BUN BLD-MCNC: 32 MG/DL (ref 9–23)
CALCIUM BLD-MCNC: 10.1 MG/DL (ref 8.5–10.1)
CHLORIDE SERPL-SCNC: 112 MMOL/L (ref 98–112)
CO2 SERPL-SCNC: 26 MMOL/L (ref 21–32)
CREAT BLD-MCNC: 1.45 MG/DL
EGFRCR SERPLBLD CKD-EPI 2021: 34 ML/MIN/1.73M2 (ref 60–?)
EOSINOPHIL # BLD AUTO: 0.37 X10(3) UL (ref 0–0.7)
EOSINOPHIL NFR BLD AUTO: 6.1 %
ERYTHROCYTE [DISTWIDTH] IN BLOOD BY AUTOMATED COUNT: 12.2 %
EST. AVERAGE GLUCOSE BLD GHB EST-MCNC: 131 MG/DL (ref 68–126)
FASTING STATUS PATIENT QL REPORTED: YES
GLOBULIN PLAS-MCNC: 3.4 G/DL (ref 2.8–4.4)
GLUCOSE BLD-MCNC: 98 MG/DL (ref 70–99)
HBA1C MFR BLD: 6.2 % (ref ?–5.7)
HCT VFR BLD AUTO: 41.2 %
HGB BLD-MCNC: 12.9 G/DL
IMM GRANULOCYTES # BLD AUTO: 0.04 X10(3) UL (ref 0–1)
IMM GRANULOCYTES NFR BLD: 0.7 %
LYMPHOCYTES # BLD AUTO: 0.96 X10(3) UL (ref 1–4)
LYMPHOCYTES NFR BLD AUTO: 15.9 %
MCH RBC QN AUTO: 30.6 PG (ref 26–34)
MCHC RBC AUTO-ENTMCNC: 31.3 G/DL (ref 31–37)
MCV RBC AUTO: 97.9 FL
MONOCYTES # BLD AUTO: 0.52 X10(3) UL (ref 0.1–1)
MONOCYTES NFR BLD AUTO: 8.6 %
NEUTROPHILS # BLD AUTO: 4.1 X10 (3) UL (ref 1.5–7.7)
NEUTROPHILS # BLD AUTO: 4.1 X10(3) UL (ref 1.5–7.7)
NEUTROPHILS NFR BLD AUTO: 68.2 %
OSMOLALITY SERPL CALC.SUM OF ELEC: 299 MOSM/KG (ref 275–295)
PLATELET # BLD AUTO: 213 10(3)UL (ref 150–450)
POTASSIUM SERPL-SCNC: 4.7 MMOL/L (ref 3.5–5.1)
PROT SERPL-MCNC: 7 G/DL (ref 6.4–8.2)
RBC # BLD AUTO: 4.21 X10(6)UL
SODIUM SERPL-SCNC: 141 MMOL/L (ref 136–145)
TSI SER-ACNC: 3.29 MIU/ML (ref 0.36–3.74)
WBC # BLD AUTO: 6 X10(3) UL (ref 4–11)

## 2024-03-21 PROCEDURE — 83036 HEMOGLOBIN GLYCOSYLATED A1C: CPT

## 2024-03-21 PROCEDURE — 85025 COMPLETE CBC W/AUTO DIFF WBC: CPT

## 2024-03-21 PROCEDURE — 84443 ASSAY THYROID STIM HORMONE: CPT

## 2024-03-21 PROCEDURE — 80053 COMPREHEN METABOLIC PANEL: CPT

## 2024-03-21 PROCEDURE — 36415 COLL VENOUS BLD VENIPUNCTURE: CPT

## 2024-04-10 ENCOUNTER — APPOINTMENT (OUTPATIENT)
Dept: GENERAL RADIOLOGY | Age: 89
End: 2024-04-10
Attending: EMERGENCY MEDICINE
Payer: MEDICARE

## 2024-04-10 ENCOUNTER — APPOINTMENT (OUTPATIENT)
Dept: CT IMAGING | Age: 89
End: 2024-04-10
Attending: EMERGENCY MEDICINE
Payer: MEDICARE

## 2024-04-10 ENCOUNTER — HOSPITAL ENCOUNTER (EMERGENCY)
Age: 89
Discharge: HOME | End: 2024-04-10
Attending: EMERGENCY MEDICINE
Payer: MEDICARE

## 2024-04-10 VITALS
OXYGEN SATURATION: 99 % | WEIGHT: 143 LBS | HEIGHT: 62 IN | BODY MASS INDEX: 26.31 KG/M2 | SYSTOLIC BLOOD PRESSURE: 146 MMHG | DIASTOLIC BLOOD PRESSURE: 79 MMHG | TEMPERATURE: 98 F | HEART RATE: 81 BPM | RESPIRATION RATE: 16 BRPM

## 2024-04-10 DIAGNOSIS — R82.90 ABNORMAL URINALYSIS: ICD-10-CM

## 2024-04-10 DIAGNOSIS — S30.1XXA CONTUSION OF FLANK, INITIAL ENCOUNTER: ICD-10-CM

## 2024-04-10 DIAGNOSIS — S76.012A STRAIN OF LEFT HIP, INITIAL ENCOUNTER: Primary | ICD-10-CM

## 2024-04-10 LAB
ANION GAP SERPL CALC-SCNC: 5 MMOL/L (ref 0–18)
BASOPHILS # BLD AUTO: 0.04 X10(3) UL (ref 0–0.2)
BASOPHILS NFR BLD AUTO: 0.6 %
BILIRUB UR QL STRIP.AUTO: NEGATIVE
BUN BLD-MCNC: 36 MG/DL (ref 9–23)
CALCIUM BLD-MCNC: 9.9 MG/DL (ref 8.5–10.1)
CHLORIDE SERPL-SCNC: 111 MMOL/L (ref 98–112)
CO2 SERPL-SCNC: 25 MMOL/L (ref 21–32)
COLOR UR AUTO: YELLOW
CREAT BLD-MCNC: 1.55 MG/DL
EGFRCR SERPLBLD CKD-EPI 2021: 31 ML/MIN/1.73M2 (ref 60–?)
EOSINOPHIL # BLD AUTO: 0.26 X10(3) UL (ref 0–0.7)
EOSINOPHIL NFR BLD AUTO: 4 %
ERYTHROCYTE [DISTWIDTH] IN BLOOD BY AUTOMATED COUNT: 12.5 %
GLUCOSE BLD-MCNC: 140 MG/DL (ref 70–99)
GLUCOSE UR STRIP.AUTO-MCNC: NEGATIVE MG/DL
HCT VFR BLD AUTO: 39.7 %
HGB BLD-MCNC: 13.2 G/DL
IMM GRANULOCYTES # BLD AUTO: 0.03 X10(3) UL (ref 0–1)
IMM GRANULOCYTES NFR BLD: 0.5 %
KETONES UR STRIP.AUTO-MCNC: NEGATIVE MG/DL
LYMPHOCYTES # BLD AUTO: 0.8 X10(3) UL (ref 1–4)
LYMPHOCYTES NFR BLD AUTO: 12.3 %
MCH RBC QN AUTO: 31.6 PG (ref 26–34)
MCHC RBC AUTO-ENTMCNC: 33.2 G/DL (ref 31–37)
MCV RBC AUTO: 95 FL
MONOCYTES # BLD AUTO: 0.48 X10(3) UL (ref 0.1–1)
MONOCYTES NFR BLD AUTO: 7.4 %
NEUTROPHILS # BLD AUTO: 4.87 X10 (3) UL (ref 1.5–7.7)
NEUTROPHILS # BLD AUTO: 4.87 X10(3) UL (ref 1.5–7.7)
NEUTROPHILS NFR BLD AUTO: 75.2 %
NITRITE UR QL STRIP.AUTO: POSITIVE
OSMOLALITY SERPL CALC.SUM OF ELEC: 303 MOSM/KG (ref 275–295)
PH UR STRIP.AUTO: 7 [PH] (ref 5–8)
PLATELET # BLD AUTO: 213 10(3)UL (ref 150–450)
POTASSIUM SERPL-SCNC: 4.5 MMOL/L (ref 3.5–5.1)
RBC # BLD AUTO: 4.18 X10(6)UL
RBC UR QL AUTO: NEGATIVE
SODIUM SERPL-SCNC: 141 MMOL/L (ref 136–145)
SP GR UR STRIP.AUTO: 1.02 (ref 1–1.03)
UROBILINOGEN UR STRIP.AUTO-MCNC: 0.2 MG/DL
WBC # BLD AUTO: 6.5 X10(3) UL (ref 4–11)

## 2024-04-10 PROCEDURE — 80048 BASIC METABOLIC PNL TOTAL CA: CPT | Performed by: EMERGENCY MEDICINE

## 2024-04-10 PROCEDURE — 99284 EMERGENCY DEPT VISIT MOD MDM: CPT

## 2024-04-10 PROCEDURE — 87186 SC STD MICRODIL/AGAR DIL: CPT | Performed by: EMERGENCY MEDICINE

## 2024-04-10 PROCEDURE — 87086 URINE CULTURE/COLONY COUNT: CPT | Performed by: EMERGENCY MEDICINE

## 2024-04-10 PROCEDURE — 85025 COMPLETE CBC W/AUTO DIFF WBC: CPT | Performed by: EMERGENCY MEDICINE

## 2024-04-10 PROCEDURE — 81001 URINALYSIS AUTO W/SCOPE: CPT | Performed by: EMERGENCY MEDICINE

## 2024-04-10 PROCEDURE — 87088 URINE BACTERIA CULTURE: CPT | Performed by: EMERGENCY MEDICINE

## 2024-04-10 PROCEDURE — 96365 THER/PROPH/DIAG IV INF INIT: CPT

## 2024-04-10 PROCEDURE — 73552 X-RAY EXAM OF FEMUR 2/>: CPT | Performed by: EMERGENCY MEDICINE

## 2024-04-10 PROCEDURE — 73502 X-RAY EXAM HIP UNI 2-3 VIEWS: CPT | Performed by: EMERGENCY MEDICINE

## 2024-04-10 PROCEDURE — 81015 MICROSCOPIC EXAM OF URINE: CPT | Performed by: EMERGENCY MEDICINE

## 2024-04-10 PROCEDURE — 73700 CT LOWER EXTREMITY W/O DYE: CPT | Performed by: EMERGENCY MEDICINE

## 2024-04-10 RX ORDER — CEPHALEXIN 500 MG/1
500 CAPSULE ORAL 4 TIMES DAILY
Qty: 28 CAPSULE | Refills: 0 | Status: SHIPPED | OUTPATIENT
Start: 2024-04-10 | End: 2024-04-17

## 2024-04-10 RX ORDER — ONDANSETRON 2 MG/ML
4 INJECTION INTRAMUSCULAR; INTRAVENOUS
Status: DISCONTINUED | OUTPATIENT
Start: 2024-04-10 | End: 2024-04-11

## 2024-04-10 RX ORDER — HYDROMORPHONE HYDROCHLORIDE 1 MG/ML
0.5 INJECTION, SOLUTION INTRAMUSCULAR; INTRAVENOUS; SUBCUTANEOUS EVERY 30 MIN PRN
Status: DISCONTINUED | OUTPATIENT
Start: 2024-04-10 | End: 2024-04-11

## 2024-04-11 NOTE — ED INITIAL ASSESSMENT (HPI)
Patient states she fell while walking to a chair. Unsure of what caused the fall. Denies loss of consciousness. Patient alert and oriented 4/4. Complains of pain to left hip that radiates down left leg.

## 2024-04-11 NOTE — ED PROVIDER NOTES
Patient Seen in: [facility] In [place]      History     Chief Complaint   Patient presents with    Fall     Stated Complaint: Back pain from fall    Subjective:   HPI    Patient with history of hypertension, chronic kidney disease, spondylopathy of the lower back with severe spinal stenosis and neurogenic claudications.  Patient had a fall today.  Patient recalls walking to a chair.  She describes putting her plate of food on the walker and walking over to her rocking chair to eat dinner.  She locked her chair.  She is unsure exactly why she fell but remembers actually falling and never lost consciousness.  She fell backward toward her left side.  Patient complaining of pain in the left hip that radiates down the leg.  It hurts to move and to bear weight.  She has a bruise on her left flank.  No recent high fever or shaking chills.  No cold or cough.  No vomiting or diarrhea.  No black or bloody stool.  No urinary symptoms.    Objective:   Past Medical History:    Back pain    3 months ago    Back problem    Glaucoma    Hx of actinic keratosis    Per Dr Jose G Gupta - Actinic keratosis destroyed               Past Surgical History:   Procedure Laterality Date    Appendectomy  1950    Appendectomy      Cataract  2005    Cataract surgery, complex  2005    Colonoscopy  05/1998 03/2004    Colonoscopy  4/2/2014    Procedure: COLONOSCOPY;  Surgeon: Harshad Duron MD;  Location:  ENDOSCOPY    Diagnostic anoscopy      Hysterectomy  1976                Social History     Socioeconomic History    Marital status:    Occupational History    Occupation: retired    Tobacco Use    Smoking status: Never    Smokeless tobacco: Never   Vaping Use    Vaping status: Never Used   Substance and Sexual Activity    Alcohol use: Not Currently     Alcohol/week: 0.0 standard drinks of alcohol     Comment: 2 a year     Drug use: No   Other Topics Concern    Caffeine Concern Yes     Comment: 1 coffee or 1 tea   daily    Exercise Yes     Comment: house work     Seat Belt Yes    Self-Exams Yes     Comment: self breast exam occasionally              Review of Systems    Positive for stated complaint: Back pain from fall  Other systems are as noted in HPI.  Constitutional and vital signs reviewed.      All other systems reviewed and negative except as noted above.    Physical Exam     ED Triage Vitals [04/10/24 1930]   BP (!) 162/86   Pulse 84   Resp 18   Temp 97.9 °F (36.6 °C)   Temp src Oral   SpO2 94 %   O2 Device None (Room air)       Current:/79   Pulse 81   Temp 97.9 °F (36.6 °C) (Oral)   Resp 16   Ht 157.5 cm (5' 2\")   Wt 64.9 kg   SpO2 99%   BMI 26.16 kg/m²         Physical Exam  General: The patient is awake, alert, conversant.  Scalp is atraumatic and nontender.  Speech is clear and there is facial symmetry  Eyes: sclera white, conjunctiva pink and moist.  Lids and lashes are normal.  Oromucosa lips are moist  Neck: Nontender midline and paraspinal musculature with good active nontender range of motion  Chest: Nontender  Heart: Normal S1 and S2, without murmur.  Distal pulses are strong and symmetric.  Abdomen: Soft, nondistended.  Completely nontender with no pulsatile mass.  There is a contusion and abrasion noted over the left lower flank just superior to the pelvic brim.  Localized tenderness noted here.  Extremities: Some tenderness with passive range of motion especially in flexion of the left hip..  Calves nonswollen, symmetric, nontender.  No pedal edema.  Neurologic:  Mental status as above.  Patient moves all extremities with good strength and coordination.           ED Course     Labs Reviewed   BASIC METABOLIC PANEL (8) - Abnormal; Notable for the following components:       Result Value    Glucose 140 (*)     BUN 36 (*)     Creatinine 1.55 (*)     Calculated Osmolality 303 (*)     eGFR-Cr 31 (*)     All other components within normal limits   URINALYSIS, ROUTINE - Abnormal; Notable for the  following components:    Clarity Urine Slightly Cloudy (*)     Protein Urine 30 mg/dL (*)     Nitrite Urine Positive (*)     Leukocyte Esterase Urine Moderate (*)     All other components within normal limits   UA MICROSCOPIC ONLY, URINE - Abnormal; Notable for the following components:    WBC Urine 11-20 (*)     Bacteria Urine 3+ (*)     Squamous Epi. Cells Few (*)     All other components within normal limits   URINE CULTURE, ROUTINE - Abnormal; Notable for the following components:    Urine Culture >100,000 CFU/ML Escherichia coli (*)     All other components within normal limits   CBC W/ DIFFERENTIAL - Abnormal; Notable for the following components:    Lymphocyte Absolute 0.80 (*)     All other components within normal limits   CBC WITH DIFFERENTIAL WITH PLATELET    Narrative:     The following orders were created for panel order CBC With Differential With Platelet.  Procedure                               Abnormality         Status                     ---------                               -----------         ------                     CBC W/ DIFFERENTIAL[[id number]]          Abnormal            Final result                 Please view results for these tests on the individual orders.                      MDM      Patient had a nonsyncopal fall onto her left side.  She complains of pain in the left hip.  Fracture there consideration.  Pelvic or femur fracture also considered.    Patient treated with pain medication and closely monitored    CBC shows normal white count, hemoglobin, and platelets  Metabolic panel shows normal electrolytes.  Creatinine elevated similar to previous  Urinalysis shows positive nitrites and leukocytes with 11-20 WBCs and 3+ bacteria    Left hip x-ray    CONCLUSION:  No acute fracture or dislocation is seen at this time.  If clinical symptoms persist then consider follow-up imaging.          Left femur x-ray  I personally reviewed the actual radiographs themselves and my individual  interpretation shows no fracture  radiologist's formal interpretation which I have reviewed  CONCLUSION:  No acute fracture or dislocation is seen at this time.  If clinical symptoms persist then consider follow-up imaging.       CT hip  Pending    Case signed out to my colleague at end of shift to make final disposition.  If CT imaging negative, I believe patient may be safely discharged home.      She did tolerate a trial of ambulation.  I recommend:  Tylenol for pain  Good wound care to the abrasion on your back  Rest  You may apply a cool compress to your injuries 20 minutes at a time     Contact your primary care doctor in the morning to arrange close follow-up                                Medical Decision Making      Disposition and Plan     Clinical Impression:  1. Strain of left hip, initial encounter    2. Contusion of flank, initial encounter    3. Abnormal urinalysis         Disposition:  Discharge  4/10/2024 10:41 pm    Follow-up:  Camilo Perez MD  [address]  [facility] [address]  [phone]    Call in 1 day(s)            Medications Prescribed:  Discharge Medication List as of 4/10/2024 11:08 PM        START taking these medications    Details   cephalexin 500 MG Oral Cap Take 1 capsule (500 mg total) by mouth 4 (four) times daily for 7 days., Normal, Disp-28 capsule, R-0

## 2024-04-11 NOTE — DISCHARGE INSTRUCTIONS
Tylenol for pain  Good wound care to the abrasion on your back  Rest  You may apply a cool compress to your injuries 20 minutes at a time    Keflex antibiotic    Contact your primary care doctor in the morning to arrange close follow-up.  A urine culture was sent which takes about 3 to 5 days to develop.  Your primary care doctor can follow-up on this

## 2024-04-29 DIAGNOSIS — E78.5 HYPERLIPIDEMIA LDL GOAL <100: ICD-10-CM

## 2024-04-30 RX ORDER — LOVASTATIN 20 MG/1
20 TABLET ORAL EVERY EVENING
Qty: 90 TABLET | Refills: 0 | Status: SHIPPED | OUTPATIENT
Start: 2024-04-30

## 2024-04-30 NOTE — TELEPHONE ENCOUNTER
Requested Prescriptions     Pending Prescriptions Disp Refills    LOVASTATIN 20 MG Oral Tab [Pharmacy Med Name: LOVASTATIN 20MG TABLETS] 90 tablet 0     Sig: TAKE 1 TABLET(20 MG) BY MOUTH EVERY EVENING     LOV 2/26/2024     Patient was asked to follow-up in: 6 months    Appointment scheduled: 8/28/2024 Camilo Perez MD     Medication failed protocol.    Routed to Dr. Ana MD

## 2024-05-01 DIAGNOSIS — I10 ESSENTIAL HYPERTENSION: ICD-10-CM

## 2024-05-02 RX ORDER — LOSARTAN POTASSIUM 25 MG/1
25 TABLET ORAL DAILY
Qty: 90 TABLET | Refills: 0 | Status: SHIPPED | OUTPATIENT
Start: 2024-05-02

## 2024-05-02 NOTE — TELEPHONE ENCOUNTER
Requested Prescriptions     Pending Prescriptions Disp Refills    LOSARTAN 25 MG Oral Tab [Pharmacy Med Name: LOSARTAN 25MG TABLETS] 90 tablet 0     Sig: TAKE 1 TABLET(25 MG) BY MOUTH DAILY     LOV 2/26/2024     Patient was asked to follow-up in: 6 months    Appointment scheduled: 8/28/2024 Camilo Perez MD     Medication refilled per protocol.

## 2024-07-16 ENCOUNTER — APPOINTMENT (OUTPATIENT)
Dept: GENERAL RADIOLOGY | Age: 89
End: 2024-07-16
Attending: NURSE PRACTITIONER
Payer: MEDICARE

## 2024-07-16 ENCOUNTER — HOSPITAL ENCOUNTER (EMERGENCY)
Age: 89
Discharge: HOME | End: 2024-07-16
Attending: STUDENT IN AN ORGANIZED HEALTH CARE EDUCATION/TRAINING PROGRAM
Payer: MEDICARE

## 2024-07-16 VITALS
HEART RATE: 74 BPM | TEMPERATURE: 98 F | RESPIRATION RATE: 18 BRPM | HEIGHT: 63 IN | BODY MASS INDEX: 25.69 KG/M2 | WEIGHT: 145 LBS | OXYGEN SATURATION: 95 % | DIASTOLIC BLOOD PRESSURE: 78 MMHG | SYSTOLIC BLOOD PRESSURE: 136 MMHG

## 2024-07-16 DIAGNOSIS — S50.01XA CONTUSION OF RIGHT ELBOW, INITIAL ENCOUNTER: ICD-10-CM

## 2024-07-16 DIAGNOSIS — S42.254A CLOSED NONDISPLACED FRACTURE OF GREATER TUBEROSITY OF RIGHT HUMERUS, INITIAL ENCOUNTER: ICD-10-CM
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
## 2024-07-16 PROCEDURE — 73060 X-RAY EXAM OF HUMERUS: CPT | Performed by: STUDENT IN AN ORGANIZED HEALTH CARE EDUCATION/TRAINING PROGRAM

## 2024-07-16 PROCEDURE — 99284 EMERGENCY DEPT VISIT MOD MDM: CPT

## 2024-07-16 PROCEDURE — 73080 X-RAY EXAM OF ELBOW: CPT | Performed by: NURSE PRACTITIONER

## 2024-07-16 RX ORDER — TRAMADOL HYDROCHLORIDE 50 MG/1
50 TABLET ORAL EVERY 6 HOURS PRN
Qty: 15 TABLET | Refills: 0 | Status: SHIPPED | OUTPATIENT
Start: 2024-07-16 | End: 2024-07-21

## 2024-07-16 NOTE — DISCHARGE INSTRUCTIONS
Rest and apply ice 20 minutes on and then 40 minutes off.   Wear the sling, but take this off at intervals and move your shoulder around.   Take Tylenol as needed for mild to moderate pain.  Use the Tramadol as needed for more severe pain.   Follow up with Dr. Fleming in the next week for orthopedics.
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
## 2024-07-17 ENCOUNTER — OFFICE VISIT (OUTPATIENT)
Facility: CLINIC | Age: 89
End: 2024-07-17
Payer: MEDICARE

## 2024-07-17 VITALS — WEIGHT: 145 LBS | BODY MASS INDEX: 26.68 KG/M2 | HEIGHT: 62 IN

## 2024-07-17 DIAGNOSIS — S42.294A OTHER CLOSED NONDISPLACED FRACTURE OF PROXIMAL END OF RIGHT HUMERUS, INITIAL ENCOUNTER: Primary | ICD-10-CM
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
## 2024-07-17 DIAGNOSIS — S50.01XA CONTUSION OF RIGHT ELBOW, INITIAL ENCOUNTER: ICD-10-CM

## 2024-07-17 PROCEDURE — 99204 OFFICE O/P NEW MOD 45 MIN: CPT | Performed by: FAMILY MEDICINE

## 2024-07-17 RX ORDER — NETARSUDIL AND LATANOPROST OPHTHALMIC SOLUTION, 0.02%/0.005% .2; .05 MG/ML; MG/ML
1 SOLUTION/ DROPS OPHTHALMIC; TOPICAL NIGHTLY
COMMUNITY
Start: 2024-04-22

## 2024-07-23 ENCOUNTER — OFFICE VISIT (OUTPATIENT)
Facility: LOCATION | Age: 89
End: 2024-07-23
Attending: FAMILY MEDICINE
Payer: MEDICARE

## 2024-07-23 DIAGNOSIS — S42.294A OTHER CLOSED NONDISPLACED FRACTURE OF PROXIMAL END OF RIGHT HUMERUS, INITIAL ENCOUNTER: Primary | ICD-10-CM

## 2024-07-23 PROCEDURE — 97110 THERAPEUTIC EXERCISES: CPT

## 2024-07-23 PROCEDURE — 97162 PT EVAL MOD COMPLEX 30 MIN: CPT
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
## 2024-07-23 NOTE — PROGRESS NOTES
INITIAL EVALUATION:   Referring Physician: Dr. Chavez  Diagnosis: Other closed nondisplaced fracture of proximal end of right humerus, initial encounter (S42.294A)     Date of Service: 7/23/2024     PATIENT SUMMARY/ASSESSMENT   Clary Quinones is a 89 year old y/o female who presents to therapy today with complaints of right shoulder/upper arm pain. She reports she fell at home last week on Monday and landed on her right side/shoulder. She had significant pain and went to the ER and was found to have a non-displaced fracture of the right humerus. She was given a sling and had a follow up with an orthopedic doctor on the next day and was referred to outpatient physical therapy. She lives alone with family living close by and one daughter staying with her temporarily    Pain: Present 0/10  Best 0/10  Worst 6/10  The patient reports pain primarily around the lateral upper arm. She reports her symptoms are aggravated with moving the arm certain ways. She reports her symptoms are eased restin    Clary Quinones presents with impaired joint mobility, motor function, muscle performance, and range of motion associated with right humeral fracture  There are 1 personal factors and co-morbidities influencing plan of care, including patient age, making treatment more difficult  There are 3 or more Body Structures/Functions, Activity Limitations and Participation Restrictions, including decreased shoulder strength, decreased shoulder ROM, difficulty with overhead tasks  Clinical Presentation: Evolving   Clary describes prior level of function as independent with self-care tasks and ADLs, living alone and ambulating with a rollator. She does reports she's had many falls in the past. Pt goals include decreasing her complaints of pain and returning to her previous level of function.  Past medical history was reviewed with Clary. Significant findings include  has a past medical history of Back pain, Back problem,  Glaucoma, and actinic keratosis (03/29/2017).  Clary would benefit from skilled Physical Therapy to address the above impairments to decrease complaints of pain and facilitate return to previous level of function    Precautions:  Fall Risk  OBJECTIVE:   Observation/Posture: The patient sits with poor posture with a forward head and rounded shoulders    Palpation: Moderate tenderness with palpation of the distal aspect of the lateral right humerus/upper arm    ROM: Supine PROM right shoulder flexion 120 degrees, ER 45 degrees at 45 degrees of abduction, IR to abdomen at 45 degrees of abduction    Strength/MMT: Deferred due to fracture    Today’s Treatment and Response: Reviewed plan of care and role of physical therapy. Performed supine PROM, seated elbow AROM and gripping exercise with handout provided for HEP    Charges: PT Eval Moderate complexity x1, TherEx x 1      Total Timed Treatment: 10 min     Total Treatment Time: 35 min     PLAN OF CARE:    Goals:  (To be met in 8 visits)  Patient to demonstrate independence and compliance with comprehensive home exercise program   Patient to demonstrate improved right shoulder AROM to be equal to the left  Patient to demonstrate improved right shoulder strength to be 5/5 throughout  Patient to report decreased complaints of pain at its worst to be less than or equal to 3/10    Frequency / Duration: Patient will be seen for 1-2 x/week or a total of 8 visits over a 90 day period. Treatment will include: Manual Therapy; Therapeutic Exercises; Neuromuscular Re-education; Therapeutic Activity; Electrical Stim; Ultrasound;     Education or treatment limitation: None  Rehab Potential:good      Patient/Family/Caregiver was advised of these findings, precautions, and treatment options and has agreed to actively participate in planning and for this course of care.    Thank you for your referral. Please co-sign or sign and return this letter via fax as soon as possible to  [phone]. If you have any questions, please contact me at Dept: [phone]    Sincerely,  Electronically signed by therapist: Baron Carlos PT    Physician's certification required: Yes  I certify the need for these services furnished under this plan of treatment and while under my care.    X___________________________________________________ Date____________________    Certification From: 7/23/2024  To:10/21/2024

## 2024-07-25 ENCOUNTER — TELEPHONE (OUTPATIENT)
Dept: PHYSICAL THERAPY | Facility: HOSPITAL | Age: 89
End: 2024-07-25

## 2024-07-25 ENCOUNTER — OFFICE VISIT (OUTPATIENT)
Facility: LOCATION | Age: 89
End: 2024-07-25
Attending: FAMILY MEDICINE
Payer: MEDICARE

## 2024-07-25 DIAGNOSIS — I10 ESSENTIAL HYPERTENSION: ICD-10-CM

## 2024-07-25 PROCEDURE — 97110 THERAPEUTIC EXERCISES: CPT

## 2024-07-25 NOTE — PROGRESS NOTES
Dx: Other closed nondisplaced fracture of proximal end of right humerus, initial encounter (S42.294A)           Authorized # of Visits:  [id number]  Fall Risk: standard         Precautions: n/a             Subjective:   The patient reports she didn't feel sore after last session  Current Pain Ratin/10  Objective:   See flow sheet    Assessment:   The patient tolerated treatment well with improving ROM and no significantly increased complaints of pain after treatment    Plan:   Progress ROM as tolerated    Date: 2024  Tx#:  Date:   Tx#: 3/ Date:   Tx#: 4/ Date:   Tx#: 5/ Date:   Tx#: 6/ Date:   Tx#: 7/ Date:   Tx#: 8/   TherEx (30 min) TherEx TherEx TherEx TherEx TherEx TherEx   Supine PROM right shoulder and elbow as tolerated         Seated AROM elbow flexion/extension x 15         Seated scapular retraction x 10 with manual cueing         Digiflex red gripping/finger flexion         Reviewed HEP         -         -         -         -         HEP: Seated elbow AROM flexion/extension, Gripping/squeezing, Scapular retraction    Charges: TherEx x 2       Total Timed Treatment: 30 min  Total Treatment Time: 30 min

## 2024-07-28 DIAGNOSIS — E78.5 HYPERLIPIDEMIA LDL GOAL <100: ICD-10-CM

## 2024-07-30 ENCOUNTER — OFFICE VISIT (OUTPATIENT)
Facility: LOCATION | Age: 89
End: 2024-07-30
Attending: FAMILY MEDICINE
Payer: MEDICARE

## 2024-07-30 PROCEDURE — 97110 THERAPEUTIC EXERCISES: CPT

## 2024-07-30 NOTE — PROGRESS NOTES
Dx: Other closed nondisplaced fracture of proximal end of right humerus, initial encounter (S42.294A)           Authorized # of Visits:  8  Fall Risk: standard         Precautions: n/a             Subjective:   The patient reports overall she's been doing ok with intermittent soreness in the shoulder  Current Pain Ratin/10  Objective:   See flow sheet    Assessment:   The patient tolerated treatment well with mild soreness/pain at end range shoulder flexion and ER    Plan:   Progress ROM as tolerated    Date: 2024  Tx#:  Date:  2024   Tx#: 3/8 Date:   Tx#: 4/ Date:   Tx#: / Date:   Tx#: 6/ Date:   Tx#: 7/ Date:   Tx#: 8/   TherEx (30 min) TherEx (28 min) TherEx TherEx TherEx TherEx TherEx   Supine PROM right shoulder and elbow as tolerated Supine PROM right shoulder and elbow as tolerated        Seated AROM elbow flexion/extension x 15 Seated AROM elbow flexion/extension 2 x 15        Seated scapular retraction x 10 with manual cueing Seated pronation/supination 2 x 15        Digiflex red gripping/finger flexion Seated scapular retraction 2 x 10 with manual cueing        Reviewed HEP Digiflex red gripping        - Reviewed HEP        - -        - -        - -        HEP: Seated elbow AROM flexion/extension, Gripping/squeezing, Scapular retraction    Charges: TherEx x 2       Total Timed Treatment: 28 min  Total Treatment Time: 28 min

## 2024-07-31 RX ORDER — LOSARTAN POTASSIUM 25 MG/1
25 TABLET ORAL DAILY
Qty: 90 TABLET | Refills: 3 | Status: SHIPPED | OUTPATIENT
Start: 2024-07-31

## 2024-07-31 NOTE — TELEPHONE ENCOUNTER
Requested Prescriptions     Pending Prescriptions Disp Refills    LOSARTAN 25 MG Oral Tab [Pharmacy Med Name: LOSARTAN 25MG TABLETS] 90 tablet 0     Sig: TAKE 1 TABLET(25 MG) BY MOUTH DAILY     LOV 2/26/2024     Patient was asked to follow-up in: 6 months    Appointment scheduled: 8/28/2024 Camilo Perez MD     Medication did not pass protocol due to GFR of 34   routed to

## 2024-08-01 ENCOUNTER — OFFICE VISIT (OUTPATIENT)
Facility: LOCATION | Age: 89
End: 2024-08-01
Attending: FAMILY MEDICINE
Payer: MEDICARE

## 2024-08-01 PROCEDURE — 97110 THERAPEUTIC EXERCISES: CPT

## 2024-08-01 NOTE — PROGRESS NOTES
Dx: Other closed nondisplaced fracture of proximal end of right humerus, initial encounter (S42.294A)           Authorized # of Visits:  8  Fall Risk: standard         Precautions: n/a             Subjective:   The patient reports she's been able to do more and is able to raise her arm without any pain  Current Pain Ratin/10  Objective:   See flow sheet    Assessment:   The patient tolerated treatment well with decreased complaints of shoulder pain with end ranges of motion    Plan:   Progress ROM as tolerated    Date: 2024  Tx#: 2 Date:  2024   Tx#: 3/8 Date:  2024   Tx#:  Date:   Tx#: / Date:   Tx#: / Date:   Tx#: / Date:   Tx#: /   TherEx (30 min) TherEx (28 min) TherEx (30 min) TherEx TherEx TherEx TherEx   Supine PROM right shoulder and elbow as tolerated Supine PROM right shoulder and elbow as tolerated Supine PROM right shoulder and elbow as tolerated       Seated AROM elbow flexion/extension x 15 Seated AROM elbow flexion/extension 2 x 15 Seated AROM elbow flexion/extension 2 x 15       Seated scapular retraction x 10 with manual cueing Seated pronation/supination 2 x 15 Seated pronation/supination x 15       Digiflex red gripping/finger flexion Seated scapular retraction 2 x 10 with manual cueing Gripping red digiflex       Reviewed HEP Digiflex red gripping Seated scapular retraction x 10       - Reviewed HEP Reviewed HEP       - - -       - - -       - - -       HEP: Seated elbow AROM flexion/extension, Gripping/squeezing, Scapular retraction    Charges: TherEx x 2       Total Timed Treatment: 30 min  Total Treatment Time: 30 min

## 2024-08-02 RX ORDER — LOVASTATIN 20 MG/1
20 TABLET ORAL EVERY EVENING
Qty: 90 TABLET | Refills: 0 | Status: SHIPPED | OUTPATIENT
Start: 2024-08-02

## 2024-08-02 NOTE — TELEPHONE ENCOUNTER
Requested Prescriptions     Pending Prescriptions Disp Refills    LOVASTATIN 20 MG Oral Tab [Pharmacy Med Name: LOVASTATIN 20MG TABLETS] 90 tablet 0     Sig: TAKE 1 TABLET(20 MG) BY MOUTH EVERY EVENING     LOV 2/26/2024     Patient was asked to follow-up in: 6 months    Appointment scheduled: 8/28/2024 Camilo Perez MD     Medication refilled per protocol.

## 2024-08-09 ENCOUNTER — OFFICE VISIT (OUTPATIENT)
Facility: CLINIC | Age: 89
End: 2024-08-09
Payer: MEDICARE

## 2024-08-09 ENCOUNTER — HOSPITAL ENCOUNTER (OUTPATIENT)
Dept: GENERAL RADIOLOGY | Age: 89
Discharge: HOME | End: 2024-08-09
Attending: FAMILY MEDICINE
Payer: MEDICARE

## 2024-08-09 VITALS — WEIGHT: 145 LBS | HEIGHT: 62 IN | BODY MASS INDEX: 26.68 KG/M2

## 2024-08-09 DIAGNOSIS — S42.294A OTHER CLOSED NONDISPLACED FRACTURE OF PROXIMAL END OF RIGHT HUMERUS, INITIAL ENCOUNTER: ICD-10-CM

## 2024-08-09 DIAGNOSIS — S50.01XD CONTUSION OF RIGHT ELBOW, SUBSEQUENT ENCOUNTER: ICD-10-CM

## 2024-08-09 DIAGNOSIS — S42.294D OTHER CLOSED NONDISPLACED FRACTURE OF PROXIMAL END OF RIGHT HUMERUS WITH ROUTINE HEALING, SUBSEQUENT ENCOUNTER: Primary | ICD-10-CM
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
## 2024-08-09 PROCEDURE — 73030 X-RAY EXAM OF SHOULDER: CPT | Performed by: FAMILY MEDICINE

## 2024-08-09 PROCEDURE — 99214 OFFICE O/P EST MOD 30 MIN: CPT | Performed by: FAMILY MEDICINE

## 2024-08-09 RX ORDER — PSEUDOEPHED/ACETAMINOPH/DIPHEN 30MG-500MG
2 TABLET ORAL EVERY 6 HOURS
COMMUNITY
Start: 2024-07-17

## 2024-08-09 NOTE — PROGRESS NOTES
Sports Medicine Clinic Note    Subjective:    Chief Complaint: Follow-up evaluation of right shoulder after initiating physical therapy.    Date of Injury: July 15, 2024    Interval History: The patient is a 89-year-old female who initially presented with pain in the right upper arm and elbow following a fall approximately 3 weeks ago. Since the last visit, the patient has participated in physical therapy and reports significant improvement. She denies any specific pain at this time and notes she has regained full range of motion in the shoulder. The patient has not required any pain medication and has not experienced any new symptoms such as numbness, tingling, or weakness.    Objective:    Right Shoulder Examination:    Inspection:  - No visible deformity, swelling, or ecchymosis  - Well-healed skin with no signs of erythema or infection    Palpation:  - No significant tenderness over the greater tuberosity or acromioclavicular joint  - Mild tenderness over the anterior shoulder consistent with prior osteoarthritic changes    Range of Motion:  - Full range of motion in all planes with no pain or discomfort  - No crepitus noted during active or passive movement    Neurovascular:  - Motor strength is 5/5 in the deltoid, biceps, triceps, and rotator cuff muscles  - Sensation intact to light touch in the axillary, radial, and ulnar nerve distributions  - Radial pulse is 2+ and brisk capillary refill in the nail beds    Diagnostic Tests:    X-ray of Right Shoulder (Two Views) reviewed during this visit:     - Joint space narrowing and marginal osteophyte formation of the glenohumeral joint consistent with osteoarthritis.     - Subtle cortical irregularity of the greater tuberosity unchanged from prior imaging; likely represents an enthesophyte rather than a nondisplaced fracture.     - Mild acromioclavicular joint hypertrophy observed.    Assessment:    Right shoulder osteoarthritis with enthesophyte formation.  Status  post-fall with no current pain or functional limitations.    Plan:    Additional Imaging/Workup:  - No further imaging is deemed necessary at this time as the patient has achieved full recovery in function without pain.    Therapy:  - Continue with home exercises as recommended by physical therapy to maintain shoulder strength and mobility.    Medications:  - No new medications prescribed. Continue with over-the-counter Tylenol as needed for any occasional discomfort.    Bracing/Casting:  - Discontinue use of the sling as the patient has regained full range of motion and no longer reports pain.    Procedures:  - No immediate procedures are recommended. Discussed the potential for injection therapy if osteoarthritic symptoms worsen, though this is not indicated at present.    Activity Recommendations:  - The patient is advised to resume normal activities as tolerated. Continue to avoid heavy lifting or activities that could strain the shoulder excessively.    Follow-Up:  - Follow-up as needed. The patient is instructed to return to the clinic if she experiences any recurrence of pain, reduction in range of motion, or other concerning symptoms. No routine follow-up is scheduled unless clinically indicated.       Fritz Chavez DO, CAM   Primary Care Sports Medicine    Department of Orthopaedic Surgery  [facility]    [address] [place] [address]  [address] [place] [address]    t: [phone]  f: [phone]      [facility]

## 2024-08-12 ENCOUNTER — TELEPHONE (OUTPATIENT)
Dept: PHYSICAL THERAPY | Facility: HOSPITAL | Age: 89
End: 2024-08-12

## 2024-08-13 ENCOUNTER — APPOINTMENT (OUTPATIENT)
Facility: LOCATION | Age: 89
End: 2024-08-13
Attending: FAMILY MEDICINE
Payer: MEDICARE

## 2024-08-15 ENCOUNTER — APPOINTMENT (OUTPATIENT)
Facility: LOCATION | Age: 89
End: 2024-08-15
Attending: FAMILY MEDICINE
Payer: MEDICARE

## 2024-08-21 ENCOUNTER — APPOINTMENT (OUTPATIENT)
Facility: LOCATION | Age: 89
End: 2024-08-21
Attending: FAMILY MEDICINE
Payer: MEDICARE

## 2024-08-23 ENCOUNTER — APPOINTMENT (OUTPATIENT)
Facility: LOCATION | Age: 89
End: 2024-08-23
Attending: FAMILY MEDICINE
Payer: MEDICARE

## 2024-08-26 PROBLEM — H43.12 VITREOUS HEMORRHAGE, LEFT EYE (HCC): Status: ACTIVE | Noted: 2024-08-26

## 2024-08-27 NOTE — ASSESSMENT & PLAN NOTE
Last K was 4.5 done on 4/10/2024.  Last Cr was 1.55 done on 4/10/2024.  Last eGFR was 31 on 4/10/2024.  BP Meds: losartan Tabs - 25 MG

## 2024-08-27 NOTE — ASSESSMENT & PLAN NOTE
3/2024 vitreous hemorhage at [facility]. Continue to work with ophthalmology. Continue to monitor and continue present management

## 2024-08-28 ENCOUNTER — OFFICE VISIT (OUTPATIENT)
Dept: FAMILY MEDICINE CLINIC | Facility: CLINIC | Age: 89
End: 2024-08-28
Payer: MEDICARE

## 2024-08-28 VITALS
RESPIRATION RATE: 12 BRPM | DIASTOLIC BLOOD PRESSURE: 70 MMHG | SYSTOLIC BLOOD PRESSURE: 138 MMHG | HEART RATE: 70 BPM | WEIGHT: 135.63 LBS | BODY MASS INDEX: 24.96 KG/M2 | HEIGHT: 62 IN

## 2024-08-28 DIAGNOSIS — N18.4 CHRONIC KIDNEY DISEASE, STAGE 4 (SEVERE) (HCC): ICD-10-CM

## 2024-08-28 DIAGNOSIS — H43.12 VITREOUS HEMORRHAGE, LEFT EYE (HCC): Primary | ICD-10-CM

## 2024-08-28 DIAGNOSIS — E78.2 MIXED HYPERLIPIDEMIA: ICD-10-CM

## 2024-08-28 DIAGNOSIS — I10 ESSENTIAL HYPERTENSION: ICD-10-CM

## 2024-08-28 PROCEDURE — 99214 OFFICE O/P EST MOD 30 MIN: CPT | Performed by: FAMILY MEDICINE

## 2024-08-28 PROCEDURE — G2211 COMPLEX E/M VISIT ADD ON: HCPCS | Performed by: FAMILY MEDICINE

## 2024-08-28 NOTE — PROGRESS NOTES
Clary is a 89 year old female coming in for had concerns including Medication Request (Follow up ).    Subjective:   HPI   BP usually 135 SBP  Last A1c value was 6.2% done 3/21/2024.   Still witn walking issues. Using handicap placard.     Objective:   /70   Pulse 70   Resp 12   Ht 5' 2\" (1.575 m)   Wt 135 lb 9.6 oz (61.5 kg)   BMI 24.80 kg/m²  Body mass index is 24.8 kg/m².   Physical Exam  Vitals and nursing note reviewed.   Constitutional:       General: She is not in acute distress.     Appearance: Normal appearance.   HENT:      Head: Normocephalic.   Cardiovascular:      Rate and Rhythm: Normal rate and regular rhythm.      Pulses:           Posterior tibial pulses are 2+ on the right side and 2+ on the left side.      Heart sounds: Normal heart sounds. No murmur heard.  Pulmonary:      Effort: Pulmonary effort is normal. No respiratory distress.      Breath sounds: Normal breath sounds. No wheezing.   Abdominal:      General: Bowel sounds are normal.      Palpations: Abdomen is soft.      Tenderness: There is no abdominal tenderness.   Musculoskeletal:      Cervical back: Normal range of motion.      Right lower leg: No edema.      Left lower leg: No edema.   Skin:     General: Skin is warm and dry.      Findings: No rash.   Neurological:      Mental Status: She is alert and oriented to person, place, and time.   Psychiatric:         Mood and Affect: Mood normal.         Behavior: Behavior normal.         Thought Content: Thought content normal.         Judgment: Judgment normal.           Assessment & Plan:   1. Vitreous hemorrhage, left eye (HCC) (Primary)  Assessment & Plan:  3/2024 vitreous hemorhage at [facility]. Continue to work with ophthalmology. Continue to monitor and continue present management   2. Essential hypertension  Overview:  lisinoril 20 until 6/2015, then off with /80. Switched to losartan 25 2/10/2023 because of elevation  Assessment & Plan:  Last K was 4.5 done on  4/10/2024.  Last Cr was 1.55 done on 4/10/2024.  Last eGFR was 31 on 4/10/2024.  BP Meds: losartan Tabs - 25 MG    3. Chronic kidney disease, stage 4 (severe) (HCC)  Overview:  GFR 46 normally but 29 in 2020, due to HTN, lisinopril 10  Assessment & Plan:  4/10/2024: eGFR-Cr 31 (L); Creatinine 1.55 (H)   4. Mixed hyperlipidemia  Overview:  Lovastatin 20, niacin 500  Assessment & Plan:  Cholesterol shows Good control. Long term heart-healthy diet and lifestyle discussed and encouraged to reduce risk of cardiovascular disease.  Cholesterol: 155, done on 2/8/2023.  HDL Cholesterol: 39, done on 2/8/2023.  TriGlycerides 144, done on 2/8/2023.  LDL Cholesterol: 91, done on 2/8/2023.   Cholesterol medications include LOVASTATIN 20 MG Oral Tab [[id number]], LOVASTATIN 20 MG Oral Tab [[id number]] (Long-Term Med).        I am having Clary Quinones maintain her niacin, omega-3 fatty acids, Multiple Vitamins-Minerals (MULTIVITAMIN & MINERAL OR), Probiotic Product (PROBIOTIC-10 OR), Diclofenac Sodium, polyethylene glycol (PEG 3350), Calcium Carbonate Antacid, cyclopentolate, prednisoLONE, dorzolamide-timolol, Myrbetriq, atropine, Rocklatan, losartan, Lovastatin, and Acetaminophen Extra Strength.       Return in about 6 months (around 2/28/2025) for AWV with chonic condition follow up.

## 2024-08-29 NOTE — ASSESSMENT & PLAN NOTE
Cholesterol shows Good control. Long term heart-healthy diet and lifestyle discussed and encouraged to reduce risk of cardiovascular disease.  Cholesterol: 155, done on 2/8/2023.  HDL Cholesterol: 39, done on 2/8/2023.  TriGlycerides 144, done on 2/8/2023.  LDL Cholesterol: 91, done on 2/8/2023.   Cholesterol medications include LOVASTATIN 20 MG Oral Tab [[id number]], LOVASTATIN 20 MG Oral Tab [[id number]] (Long-Term Med).

## 2024-10-07 ENCOUNTER — TELEPHONE (OUTPATIENT)
Dept: FAMILY MEDICINE CLINIC | Facility: CLINIC | Age: 89
End: 2024-10-07

## 2024-10-07 NOTE — TELEPHONE ENCOUNTER
She completed pneumonia with the Prevnar 13 in 2015.  Because it has been more than 5 years she is welcome to get the new Prevnar 20.  It is not required but it is reasonable to consider.

## 2024-10-26 DIAGNOSIS — E78.5 HYPERLIPIDEMIA LDL GOAL <100: ICD-10-CM

## 2024-10-26 RX ORDER — LOVASTATIN 20 MG/1
20 TABLET ORAL EVERY EVENING
Qty: 90 TABLET | Refills: 0 | Status: SHIPPED | OUTPATIENT
Start: 2024-10-26

## 2024-10-26 NOTE — TELEPHONE ENCOUNTER
Requested Prescriptions     Pending Prescriptions Disp Refills    LOVASTATIN 20 MG Oral Tab [Pharmacy Med Name: LOVASTATIN 20MG TABLETS] 90 tablet 0     Sig: TAKE 1 TABLET(20 MG) BY MOUTH EVERY EVENING     LOV 8/28/2024     Patient was asked to follow-up in: 6 months    Appointment scheduled: 2/27/2025 Camilo Perez MD     Medication refilled per protocol.

## 2025-01-08 NOTE — ASSESSMENT & PLAN NOTE
As for her Pre-Diabetes, it is well controlled, no significant medication side effects noted. Recommendations are: continue present meds, lose weight by increased dietary compliance and exercise and will check labs as ordered.       Lab Results  Compone
Daily sx, PT in past, topical meds, Norco
Now seeing [facility] insurance
Stable, Continue present management.
Stable, Continue present management.     Cholesterol Lowering Medications          LOVASTATIN 20 MG Oral Tab
Stable, Continue present management.     Cholesterol Lowering Medications          LOVASTATIN 20 MG Oral Tab
Stable, Continue present management.   .    Lab Results  Component Value Date/Time   CREATSERUM 0.96 05/01/2017 08:13 AM   GFR 54* 05/01/2017 08:13 AM   GFRNAA 64 05/01/2014 08:00 AM
No

## 2025-01-16 ENCOUNTER — HOSPITAL ENCOUNTER (OUTPATIENT)
Facility: HOSPITAL | Age: OVER 89
Setting detail: OBSERVATION
Discharge: HOME | End: 2025-01-18
Attending: EMERGENCY MEDICINE | Admitting: HOSPITALIST
Payer: MEDICARE

## 2025-01-16 ENCOUNTER — APPOINTMENT (OUTPATIENT)
Dept: MRI IMAGING | Age: OVER 89
End: 2025-01-16
Attending: EMERGENCY MEDICINE
Payer: MEDICARE

## 2025-01-16 ENCOUNTER — APPOINTMENT (OUTPATIENT)
Dept: GENERAL RADIOLOGY | Age: OVER 89
End: 2025-01-16
Attending: EMERGENCY MEDICINE
Payer: MEDICARE

## 2025-01-16 ENCOUNTER — APPOINTMENT (OUTPATIENT)
Dept: CT IMAGING | Age: OVER 89
End: 2025-01-16
Attending: EMERGENCY MEDICINE
Payer: MEDICARE

## 2025-01-16 DIAGNOSIS — R55 SYNCOPE AND COLLAPSE: Primary | ICD-10-CM

## 2025-01-16 DIAGNOSIS — I10 ESSENTIAL HYPERTENSION: ICD-10-CM

## 2025-01-16 LAB
ALBUMIN SERPL-MCNC: 4.6 G/DL (ref 3.2–4.8)
ALBUMIN/GLOB SERPL: 1.6 {RATIO} (ref 1–2)
ALP LIVER SERPL-CCNC: 91 U/L
ALT SERPL-CCNC: 12 U/L
ANION GAP SERPL CALC-SCNC: 5 MMOL/L (ref 0–18)
AST SERPL-CCNC: 22 U/L (ref ?–34)
ATRIAL RATE: 76 BPM
BASOPHILS # BLD AUTO: 0.04 X10(3) UL (ref 0–0.2)
BASOPHILS NFR BLD AUTO: 0.7 %
BILIRUB SERPL-MCNC: 0.4 MG/DL (ref 0.2–0.9)
BILIRUB UR QL STRIP.AUTO: NEGATIVE
BUN BLD-MCNC: 29 MG/DL (ref 9–23)
CALCIUM BLD-MCNC: 10.9 MG/DL (ref 8.7–10.6)
CHLORIDE SERPL-SCNC: 107 MMOL/L (ref 98–112)
CO2 SERPL-SCNC: 28 MMOL/L (ref 21–32)
COLOR UR AUTO: YELLOW
CREAT BLD-MCNC: 1.3 MG/DL
EGFRCR SERPLBLD CKD-EPI 2021: 38 ML/MIN/1.73M2 (ref 60–?)
EOSINOPHIL # BLD AUTO: 0.25 X10(3) UL (ref 0–0.7)
EOSINOPHIL NFR BLD AUTO: 4.4 %
ERYTHROCYTE [DISTWIDTH] IN BLOOD BY AUTOMATED COUNT: 12.5 %
GLOBULIN PLAS-MCNC: 2.8 G/DL (ref 2–3.5)
GLUCOSE BLD-MCNC: 101 MG/DL (ref 70–99)
GLUCOSE BLD-MCNC: 104 MG/DL (ref 70–99)
GLUCOSE UR STRIP.AUTO-MCNC: NEGATIVE MG/DL
HCT VFR BLD AUTO: 40 %
HGB BLD-MCNC: 13.1 G/DL
IMM GRANULOCYTES # BLD AUTO: 0.02 X10(3) UL (ref 0–1)
IMM GRANULOCYTES NFR BLD: 0.4 %
KETONES UR STRIP.AUTO-MCNC: NEGATIVE MG/DL
LEUKOCYTE ESTERASE UR QL STRIP.AUTO: NEGATIVE
LYMPHOCYTES # BLD AUTO: 0.86 X10(3) UL (ref 1–4)
LYMPHOCYTES NFR BLD AUTO: 15.3 %
MCH RBC QN AUTO: 31.5 PG (ref 26–34)
MCHC RBC AUTO-ENTMCNC: 32.8 G/DL (ref 31–37)
MCV RBC AUTO: 96.2 FL
MONOCYTES # BLD AUTO: 0.47 X10(3) UL (ref 0.1–1)
MONOCYTES NFR BLD AUTO: 8.4 %
NEUTROPHILS # BLD AUTO: 3.98 X10 (3) UL (ref 1.5–7.7)
NEUTROPHILS # BLD AUTO: 3.98 X10(3) UL (ref 1.5–7.7)
NEUTROPHILS NFR BLD AUTO: 70.8 %
NITRITE UR QL STRIP.AUTO: NEGATIVE
OSMOLALITY SERPL CALC.SUM OF ELEC: 296 MOSM/KG (ref 275–295)
P AXIS: 36 DEGREES
P-R INTERVAL: 296 MS
PH UR STRIP.AUTO: 7.5 [PH] (ref 5–8)
PLATELET # BLD AUTO: 214 10(3)UL (ref 150–450)
POTASSIUM SERPL-SCNC: 4 MMOL/L (ref 3.5–5.1)
PROT SERPL-MCNC: 7.4 G/DL (ref 5.7–8.2)
Q-T INTERVAL: 360 MS
QRS DURATION: 82 MS
QTC CALCULATION (BEZET): 405 MS
R AXIS: -33 DEGREES
RBC # BLD AUTO: 4.16 X10(6)UL
RBC UR QL AUTO: NEGATIVE
SODIUM SERPL-SCNC: 140 MMOL/L (ref 136–145)
SP GR UR STRIP.AUTO: 1.02 (ref 1–1.03)
T AXIS: 36 DEGREES
TROPONIN I SERPL HS-MCNC: 5 NG/L
UROBILINOGEN UR STRIP.AUTO-MCNC: 0.2 MG/DL
VENTRICULAR RATE: 76 BPM
WBC # BLD AUTO: 5.6 X10(3) UL (ref 4–11)

## 2025-01-16 PROCEDURE — 99223 1ST HOSP IP/OBS HIGH 75: CPT | Performed by: STUDENT IN AN ORGANIZED HEALTH CARE EDUCATION/TRAINING PROGRAM

## 2025-01-16 PROCEDURE — 72125 CT NECK SPINE W/O DYE: CPT | Performed by: EMERGENCY MEDICINE

## 2025-01-16 PROCEDURE — 70551 MRI BRAIN STEM W/O DYE: CPT | Performed by: EMERGENCY MEDICINE

## 2025-01-16 PROCEDURE — 70450 CT HEAD/BRAIN W/O DYE: CPT | Performed by: EMERGENCY MEDICINE

## 2025-01-16 PROCEDURE — 71045 X-RAY EXAM CHEST 1 VIEW: CPT | Performed by: EMERGENCY MEDICINE

## 2025-01-16 RX ORDER — POLYETHYLENE GLYCOL 3350 17 G/17G
17 POWDER, FOR SOLUTION ORAL DAILY PRN
Status: DISCONTINUED | OUTPATIENT
Start: 2025-01-16 | End: 2025-01-18

## 2025-01-16 RX ORDER — ACETAMINOPHEN 500 MG
1000 TABLET ORAL EVERY 8 HOURS PRN
Status: DISCONTINUED | OUTPATIENT
Start: 2025-01-16 | End: 2025-01-18

## 2025-01-16 RX ORDER — ECHINACEA PURPUREA EXTRACT 125 MG
1 TABLET ORAL
Status: DISCONTINUED | OUTPATIENT
Start: 2025-01-16 | End: 2025-01-18

## 2025-01-16 RX ORDER — METOCLOPRAMIDE HYDROCHLORIDE 5 MG/ML
10 INJECTION INTRAMUSCULAR; INTRAVENOUS EVERY 8 HOURS PRN
Status: DISCONTINUED | OUTPATIENT
Start: 2025-01-16 | End: 2025-01-16

## 2025-01-16 RX ORDER — LOSARTAN POTASSIUM 25 MG/1
25 TABLET ORAL DAILY
Status: DISCONTINUED | OUTPATIENT
Start: 2025-01-17 | End: 2025-01-17

## 2025-01-16 RX ORDER — BISACODYL 10 MG
10 SUPPOSITORY, RECTAL RECTAL
Status: DISCONTINUED | OUTPATIENT
Start: 2025-01-16 | End: 2025-01-18

## 2025-01-16 RX ORDER — SODIUM CHLORIDE 9 MG/ML
INJECTION, SOLUTION INTRAVENOUS CONTINUOUS
Status: DISCONTINUED | OUTPATIENT
Start: 2025-01-16 | End: 2025-01-18

## 2025-01-16 RX ORDER — METOCLOPRAMIDE HYDROCHLORIDE 5 MG/ML
5 INJECTION INTRAMUSCULAR; INTRAVENOUS EVERY 8 HOURS PRN
Status: DISCONTINUED | OUTPATIENT
Start: 2025-01-16 | End: 2025-01-18

## 2025-01-16 RX ORDER — DORZOLAMIDE HYDROCHLORIDE AND TIMOLOL MALEATE 20; 5 MG/ML; MG/ML
1 SOLUTION/ DROPS OPHTHALMIC 2 TIMES DAILY
Status: DISCONTINUED | OUTPATIENT
Start: 2025-01-16 | End: 2025-01-18

## 2025-01-16 RX ORDER — SENNOSIDES 8.6 MG
17.2 TABLET ORAL NIGHTLY PRN
Status: DISCONTINUED | OUTPATIENT
Start: 2025-01-16 | End: 2025-01-18

## 2025-01-16 RX ORDER — LABETALOL HYDROCHLORIDE 5 MG/ML
10 INJECTION, SOLUTION INTRAVENOUS ONCE
Status: COMPLETED | OUTPATIENT
Start: 2025-01-16 | End: 2025-01-16

## 2025-01-16 RX ORDER — SODIUM CHLORIDE 9 MG/ML
INJECTION, SOLUTION INTRAVENOUS CONTINUOUS
Status: ACTIVE | OUTPATIENT
Start: 2025-01-16 | End: 2025-01-17

## 2025-01-16 RX ORDER — PREDNISOLONE ACETATE 10 MG/ML
1 SUSPENSION/ DROPS OPHTHALMIC 2 TIMES DAILY
Status: DISCONTINUED | OUTPATIENT
Start: 2025-01-16 | End: 2025-01-18

## 2025-01-16 RX ORDER — ASPIRIN 81 MG/1
81 TABLET, CHEWABLE ORAL ONCE
Status: COMPLETED | OUTPATIENT
Start: 2025-01-16 | End: 2025-01-16

## 2025-01-16 RX ORDER — HEPARIN SODIUM 5000 [USP'U]/ML
5000 INJECTION, SOLUTION INTRAVENOUS; SUBCUTANEOUS EVERY 8 HOURS SCHEDULED
Status: DISCONTINUED | OUTPATIENT
Start: 2025-01-16 | End: 2025-01-18

## 2025-01-16 RX ORDER — ONDANSETRON 2 MG/ML
4 INJECTION INTRAMUSCULAR; INTRAVENOUS EVERY 6 HOURS PRN
Status: DISCONTINUED | OUTPATIENT
Start: 2025-01-16 | End: 2025-01-18

## 2025-01-16 RX ORDER — MIRABEGRON 50 MG/1
50 TABLET, FILM COATED, EXTENDED RELEASE ORAL DAILY
Status: DISCONTINUED | OUTPATIENT
Start: 2025-01-17 | End: 2025-01-18

## 2025-01-16 RX ORDER — SODIUM CHLORIDE 9 MG/ML
125 INJECTION, SOLUTION INTRAVENOUS CONTINUOUS
Status: DISCONTINUED | OUTPATIENT
Start: 2025-01-16 | End: 2025-01-18

## 2025-01-16 RX ORDER — ONDANSETRON 2 MG/ML
4 INJECTION INTRAMUSCULAR; INTRAVENOUS EVERY 4 HOURS PRN
Status: DISCONTINUED | OUTPATIENT
Start: 2025-01-16 | End: 2025-01-16 | Stop reason: SDUPTHER

## 2025-01-16 RX ORDER — PRAVASTATIN SODIUM 20 MG
20 TABLET ORAL NIGHTLY
Status: DISCONTINUED | OUTPATIENT
Start: 2025-01-16 | End: 2025-01-18

## 2025-01-16 RX ORDER — BENZONATATE 200 MG/1
200 CAPSULE ORAL 3 TIMES DAILY PRN
Status: DISCONTINUED | OUTPATIENT
Start: 2025-01-16 | End: 2025-01-18

## 2025-01-16 NOTE — H&P
[facility]  History and Physical     Clary Quinones Patient Status:  Emergency    1930 MRN [id number]   Location [facility] IN [place] Attending Jacobo Randall MD   Hosp Day # 0 PCP Camilo Perez MD     Chief Complaint: Syncope    History of Present Illness: Clary Quinones is a 89 year old female with no known PMHx who presents for syncope.    Patient was in usual state of health today when she was sitting on the rocking chair, attempted to get up and go to the kitchen, was using her rolling walker and while walking had sudden loss of consciousness.  She does not remember events leading to this, denies any chest pain, palpitations, lightheadedness, dizziness, shortness of breath prior to syncopal episode.  Patient does have a history of recurrent falls.    Past Medical History:  Past Medical History:    Back pain    3 months ago    Back problem    Glaucoma    Hx of actinic keratosis    Per Dr Jose G Gupta - Actinic keratosis destroyed         Past Surgical History:   Past Surgical History:   Procedure Laterality Date    Appendectomy      Appendectomy      Cataract      Cataract surgery, complex      Colonoscopy  1998    Colonoscopy  2014    Procedure: COLONOSCOPY;  Surgeon: Harshad Duron MD;  Location:  ENDOSCOPY    Diagnostic anoscopy      Hysterectomy         Social History:  reports that she has never smoked. She has never used smokeless tobacco. She reports that she does not currently use alcohol. She reports that she does not use drugs.    Family History:   Family History   Problem Relation Age of Onset    Cancer Father         Lung Cancer    Stroke Mother     Heart Attack Brother     Cancer Brother     Stroke Sister        Allergies: Allergies[1]    Medications:  Medications Ordered Prior to Encounter[2]    Review of Systems:   A comprehensive 14 point review of systems was completed.    Pertinent positives and negatives  noted in the HPI.    Physical Exam:    BP (!) 168/77   Pulse 55   Temp 97.8 °F (36.6 °C) (Temporal)   Resp 14   Ht 5' 3\" (1.6 m)   Wt 135 lb (61.2 kg)   SpO2 97%   BMI 23.91 kg/m²   General: No acute distress. Alert and oriented x 3.  HEENT: Normocephalic atraumatic. Moist mucous membranes. EOM-I. PERRLA. Anicteric.  Neck: No lymphadenopathy. No JVD. No carotid bruits.  Respiratory: Clear to auscultation bilaterally. No wheezes. No rhonchi.  Cardiovascular: S1, S2. Regular rate and rhythm. No murmurs, rubs or gallops. Equal pulses.   Chest and Back: No tenderness or deformity.  Abdomen: Soft, nontender, nondistended.  Positive bowel sounds. No rebound, guarding or organomegaly.  Neurologic: No focal neurological deficits. CNII-XII grossly intact.  Musculoskeletal: Moves all extremities.  Extremities: No edema or cyanosis.  Integument: No rashes or lesions.   Psychiatric: Appropriate mood and affect.    Diagnostic Data:      Labs:  Recent Labs   Lab 01/16/25  1319   WBC 5.6   HGB 13.1   MCV 96.2   .0       Recent Labs   Lab 01/16/25  1319   *   BUN 29*   CREATSERUM 1.30*   CA 10.9*   ALB 4.6      K 4.0      CO2 28.0   ALKPHO 91   AST 22   ALT 12   BILT 0.4   TP 7.4       Estimated Creatinine Clearance: 21.9 mL/min (A) (based on SCr of 1.3 mg/dL (H)).    No results for input(s): \"PTP\", \"INR\" in the last 168 hours.    No results for input(s): \"TROP\", \"CK\" in the last 168 hours.    Imaging: Imaging data reviewed in Epic.  CT SPINE CERVICAL (CPT=72125)    Result Date: 1/16/2025  CONCLUSION:  No acute abnormality in the cervical spine.   LOCATION:  [facility]   Dictated by (CST): Vinicius Baca MD on 1/16/2025 at 2:32 PM     Finalized by (CST): Vinicius Baca MD on 1/16/2025 at 2:34 PM       CT BRAIN OR HEAD (CPT=70450)    Result Date: 1/16/2025  CONCLUSION:  No acute intracranial abnormality.    LOCATION:  [id number]   Dictated by (CST): Vinicius Baca MD on 1/16/2025 at 2:30 PM     Finalized by  (CST): Vinicius Baca MD on 1/16/2025 at 2:32 PM       XR CHEST AP PORTABLE  (CPT=71045)    Result Date: 1/16/2025  CONCLUSION: No acute cardiopulmonary abnormality.   LOCATION:  [facility]      Dictated by (CST): Vinicius Baca MD on 1/16/2025 at 1:51 PM     Finalized by (CST): Vinicius Baca MD on 1/16/2025 at 1:51 PM          ASSESSMENT / PLAN:     # Transient loss of consciousness    - Orthostatics ordered, IVF    - EKG NSR with 1st degree AV block, no ST-elevations/depressions or TWI   - CT Head and CT c-spine without acute findings   - MRI brain without acute CVA or ICH, mild prominence of lateral ventricles, possible NPH? Though clinically less likely; recommended outpatient neurology f/u    - TTE ordered   - Monitor on tele, can consider holter at DC given AV block    - Cardiology on consult      # Hypertension - Continue home oral antihypertensives  # Hyperlipidemia - continue home statin         Code Status: Prior    Plan of care discussed with patient, ED physician    Mir Colvin MD  1/16/2025      Supplementary Documentation:      MDM : Patient's ER labs, imaging reviewed.  AM labs ordered.  ER management discussed with ED physician, decision made for patient to be admitted to the hospital for further medical management.                  [1]   Allergies  Allergen Reactions    Latex RASH    Penicillins RASH    Polysporin [Bacitracin-Polymyxin B] RASH    Adhesive Tape RASH   [2]   No current facility-administered medications on file prior to encounter.     Current Outpatient Medications on File Prior to Encounter   Medication Sig Dispense Refill    LOVASTATIN 20 MG Oral Tab TAKE 1 TABLET(20 MG) BY MOUTH EVERY EVENING 90 tablet 0    Acetaminophen Extra Strength 500 MG Oral Tab Take 2 tablets (1,000 mg total) by mouth every 6 (six) hours.      LOSARTAN 25 MG Oral Tab TAKE 1 TABLET(25 MG) BY MOUTH DAILY 90 tablet 3    ROCKLATAN 0.02-0.005 % Ophthalmic Solution Place 1 drop into both eyes nightly.       atropine 1 % Ophthalmic Solution 1 drop 4 (four) times daily.      Mirabegron ER (MYRBETRIQ) 50 MG Oral Tablet 24 Hr Take 1 tablet (50 mg total) by mouth daily. 90 tablet 3    dorzolamide-timolol 2-0.5 % Ophthalmic Solution Place 1 drop into both eyes 2 (two) times daily.      cyclopentolate 1 % Ophthalmic Solution Place 1 drop into both eyes 4 (four) times daily.      prednisoLONE 1 % Ophthalmic Suspension SHAKE LIQUID AND INSTILL 1 DROP IN BOTH EYES EVERY 4 HOURS      Calcium Carbonate Antacid 600 MG Oral Chew Tab Chew 600 mg by mouth.      PEG 3350 Oral Powd Pack Take 17 g by mouth daily.      Diclofenac Sodium (VOLTAREN) 1 % Transdermal Gel Apply 4 g topically daily as needed. 500 g 2    Probiotic Product (PROBIOTIC-10 OR) Take by mouth.      Niacin 500 MG Oral Tab Take 1 tablet (500 mg total) by mouth daily with breakfast.      Omega-3 Fatty Acids (FISH OIL) 1000 MG Oral Cap Take 1,000 mg by mouth daily.      Multiple Vitamins-Minerals (MULTIVITAMIN & MINERAL OR) Take 1 tablet by mouth daily.

## 2025-01-16 NOTE — ED INITIAL ASSESSMENT (HPI)
Pt called daughter after  falling at home.  Pt told her Daughter she was reaching for her walker and then fell.  Pt has been dizzy/lightheaded since fall.

## 2025-01-16 NOTE — ED PROVIDER NOTES
Patient Seen in: [facility] [place]      History     Chief Complaint   Patient presents with    Dizziness     Stated Complaint: pt reports she was dizzy and fell; denies hitting her head or being on blood th*    Subjective:   HPI      This is a 89-year-old female who states that she got up she was folding some close by 1030 she stated that she normally is pretty unsteady on her feet family states that he uses a rolling walker.  She has to have that all the time.  The patient got up she said to remembers kind of being dizzy and then does not anything else absorbing on the ground.  The patient states that prior to the fall she did not have any headache, chest pain, shortness of breath, numbness, weakness..  The patient normally is unsteady on her feet and uses the walker.  The patient denies having any chest pain back pain abdominal pain before the fall.  But does not member exactly what happened.  The patient presently has no neck pain no headache no chest pain no shortness of breath no abdominal pain.  She has had no fevers no chills she is extremely hard of hearing.  The family states that she lives alone..  The patient presently has no numbness or weakness she is not even sure if she is dizzy right now.    Objective:     Past Medical History:    Back pain    3 months ago    Back problem    Glaucoma    Hx of actinic keratosis    Per Dr Jose G Gupta - Actinic keratosis destroyed               Past Surgical History:   Procedure Laterality Date    Appendectomy  1950    Appendectomy      Cataract  2005    Cataract surgery, complex  2005    Colonoscopy  05/1998 03/2004    Colonoscopy  4/2/2014    Procedure: COLONOSCOPY;  Surgeon: Harshad Duron MD;  Location:  ENDOSCOPY    Diagnostic anoscopy      Hysterectomy  1976                Social History     Socioeconomic History    Marital status:    Occupational History    Occupation: retired    Tobacco Use    Smoking status: Never     Smokeless tobacco: Never   Vaping Use    Vaping status: Never Used   Substance and Sexual Activity    Alcohol use: Not Currently     Alcohol/week: 0.0 standard drinks of alcohol     Comment: 2 a year     Drug use: No   Other Topics Concern    Caffeine Concern Yes     Comment: 1 coffee or 1 tea  daily    Exercise Yes     Comment: house work     Seat Belt Yes    Self-Exams Yes     Comment: self breast exam occasionally                  Physical Exam     ED Triage Vitals [01/16/25 1318]   BP (!) 201/96   Pulse 78   Resp 18   Temp 98.4 °F (36.9 °C)   Temp src Oral   SpO2 98 %   O2 Device None (Room air)       Current Vitals:   Vital Signs  BP: (!) 161/82  Pulse: 68  Resp: 18  Temp: 97.8 °F (36.6 °C)  Temp src: Temporal    Oxygen Therapy  SpO2: 94 %  O2 Device: None (Room air)        Physical Exam   general: .  The patient is extremely hard of hearing.  There is no signs of trauma to head or neck.   The patient is in no respiratory distress. The patient is not septic or toxic    HEENT: Atraumatic, conjunctiva are not pale.  There is no icterus.  Oral mucosa Is wet. The neck is supple. There is no meningismus.  No midline neck tenderness.    LUNGS: Clear to auscultation, there is no wheezing or retraction.  No crackles.    CV: Cardiovascular is regular without murmurs or rubs.    ABD: The abdomen is soft nondistended nontender.  There is no rebound.  There is no guarding.  Bowel sounds are present.    EXT: There is good pulses bilaterally.  There is no calf tenderness.  There is no rash noted.  There is no edema    NEURO: Alert and oriented x3.  She has no facial asymmetry she has no pronator drift normal muscle strength sensory exam is normal for her muscle strength is 5 out of 5.  Sensory exam is normal.    The patient is otherwise neurovascularly intact.  She always walks patient was able to walk from her wheelchair to get up on the bed she is a little unsteady which the family says is normal for her and she can walk  by herself.  Relates that she walks slowly.    ED Course     Labs Reviewed   COMP METABOLIC PANEL (14) - Abnormal; Notable for the following components:       Result Value    Glucose 104 (*)     BUN 29 (*)     Creatinine 1.30 (*)     Calcium, Total 10.9 (*)     Calculated Osmolality 296 (*)     eGFR-Cr 38 (*)     All other components within normal limits   CBC WITH DIFFERENTIAL WITH PLATELET - Abnormal; Notable for the following components:    Lymphocyte Absolute 0.86 (*)     All other components within normal limits   URINALYSIS WITH CULTURE REFLEX - Abnormal; Notable for the following components:    Clarity Urine Cloudy (*)     Protein Urine 30 mg/dL (*)     All other components within normal limits   POCT GLUCOSE - Abnormal; Notable for the following components:    POC Glucose 101 (*)     All other components within normal limits   TROPONIN I HIGH SENSITIVITY - Normal   UA MICROSCOPIC ONLY, URINE - Normal   RAINBOW DRAW LAVENDER   RAINBOW DRAW LIGHT GREEN   RAINBOW DRAW BLUE                 The patient was placed on monitors, IV was started, blood was drawn.    Possibilities include that this patient had a syncopal episode, nonspecific dizziness was considered there is no focal findings at this present time.  Workup was done to rule out an acute intracranial bleed, cervical fracture electrolyte imbalance she was noted to be hypertensive acute coronary syndrome was considered acute bleed, stroke was also considered but she has no focal findings at this present time.          The EKG shows normal sinus rhythm.  There is no acute ST elevations or ischemic findings.  The rest of the EKG including rate rhythm axis and intervals I agree with the EKG report . The rate is 76 no acute ST elevation.  When compared to an old EKG from August 27, 2015 no significant changes.         MDM      The patient was placed on monitors, IV was started, blood was drawn.    Admission disposition: 1/16/2025  4:52 PM       I personally  reviewed the radiographs and my individual interpretation shows  CT of the brain shows no bleed, mass.    No pneumonia or pneumothorax.  Also reviewed official report and it shows  MRI BRAIN (CPT=70551)    Result Date: 1/16/2025  PROCEDURE:  MRI BRAIN (CPT=70551)  COMPARISON:  None.  INDICATIONS:  pt reports she was dizzy and fell; denies hitting her head or being on blood thinners. still reports dizziness  TECHNIQUE:  MRI of the brain was performed with multi-planar T1, T2-weighted images with FLAIR sequences and diffusion weighted images without infusion.  PATIENT STATED HISTORY: (As transcribed by Technologist)  Patient states she fell and landed in a sitting position earlier today and now complains of feeling dizzy. Patient states she did not hit her head    FINDINGS:  Motion degraded examination.  Moderate central volume loss.  There is no midline shift or mass effect.  The basal cisterns are patent.   There is no acute intracranial hemorrhage or extra-axial fluid collection identified.  There is no restricted diffusion to suggest acute ischemia/infarction.  There is moderate chronic small vessel ischemic disease within the cerebral white matter and jose david.  There are punctate chronic infarcts within the corona radiata, basal ganglia, thalami, and jose david.  Mild mucosal thickening within the ethmoid and frontal sinuses.  The expected major intracranial flow voids are present.            CONCLUSION:   1. No acute infarct or acute intracranial hemorrhage.  2. There is moderate chronic small vessel ischemic disease within the cerebral white matter and jose david.  There are punctate chronic infarcts within the corona radiata, basal ganglia, thalami, and jose david.  3. There is mild prominence of the lateral ventricles which may relate to the central volume loss, however, clinical correlation is recommended to exclude any signs or symptoms of normal pressure hydrocephalus.   LOCATION:  [id number]   Dictated by (CST): Stromberg, LeRoy,  MD on 1/16/2025 at 5:06 PM     Finalized by (CST): Stromberg, LeRoy, MD on 1/16/2025 at 5:14 PM       CT SPINE CERVICAL (CPT=72125)    Result Date: 1/16/2025  PROCEDURE:  CT SPINE CERVICAL (CPT=72125)  COMPARISON:  [facility], CT, CT SPINE CERVICAL (CPT=72125), 2/10/2022, 12:48 PM.  INDICATIONS:  pt reports she was dizzy and fell; denies hitting her head or being on blood thinners. still reports dizziness  TECHNIQUE:  Noncontrast CT scanning of the cervical spine is performed from the skull base through C7.  Multiplanar reconstructions are generated.  Dose reduction techniques were used. Dose information is transmitted to the ACR (American College of Radiology) NRDR (National Radiology Data Registry) which includes the Dose Index Registry.  PATIENT STATED HISTORY: (As transcribed by Technologist)  Patient has dizziness and fell today. No head injury    FINDINGS:  Cervical spine demonstrates normal vertebral body height and alignment.  No acute fractures are identified.  Multilevel degenerative changes are present.  The spinal canal is patent.  The soft tissues are within normal limits. Lung apices are within normal limits.            CONCLUSION:  No acute abnormality in the cervical spine.   LOCATION:  [facility]   Dictated by (CST): Vinicius Baca MD on 1/16/2025 at 2:32 PM     Finalized by (CST): Vinicius Baca MD on 1/16/2025 at 2:34 PM       CT BRAIN OR HEAD (CPT=70450)    Result Date: 1/16/2025  PROCEDURE:  CT BRAIN OR HEAD (56559)  COMPARISON:  [facility], CT, CT BRAIN OR HEAD ([id number]), 2/10/2022, 12:53 PM.  INDICATIONS:  pt reports she was dizzy and fell; denies hitting her head or being on blood thinners. still reports dizziness  TECHNIQUE:  Noncontrast CT scanning is performed through the brain. Dose reduction techniques were used. Dose information is transmitted to the ACR (American College of Radiology) NRDR (National Radiology Data Registry) which includes the Dose Index  Registry.  PATIENT STATED HISTORY: (As transcribed by Technologist)  Patient has dizziness and fell today. No head injury.    FINDINGS: Volume loss is noted.  Ventricles are within normal limits.  There is no midline shift or mass-effect.  The basal cisterns are patent.  The gray-white matter differentiation is intact.  There is no acute intracranial hemorrhage or extra-axial fluid collection.  Hypodensities in the periventricular and subcortical white matter is compatible with chronic small vessel disease.  There is no evident fracture.  The visualized paranasal sinuses and mastoid air cells are unremarkable.            CONCLUSION:  No acute intracranial abnormality.    LOCATION:  [id number]   Dictated by (CST): Vinicius Baca MD on 1/16/2025 at 2:30 PM     Finalized by (CST): Vinicius Baca MD on 1/16/2025 at 2:32 PM       XR CHEST AP PORTABLE  (CPT=71045)    Result Date: 1/16/2025  PROCEDURE:  XR CHEST AP PORTABLE  (CPT=71045)  TECHNIQUE:  AP chest radiograph was obtained.  COMPARISON:  [facility], XR, XR CHEST PA + LAT CHEST (CPT=71046), 2/10/2023, 10:56 AM.  INDICATIONS:  pt reports she was dizzy and fell; denies hitting her head or being on blood thinners. still reports dizziness  PATIENT STATED HISTORY: (As transcribed by Technologist)  Per patients family member the patient passed out today unwitnessed, since then the patient has complaints of dizziness. Patient denies any chest complaints.   FINDINGS:  The cardiomediastinal silhouette is within normal limits.  Chronic interstitial changes are noted in the lungs.  There is no consolidation, effusion, or pneumothorax.  No aggressive osseous lesions are identified.            CONCLUSION: No acute cardiopulmonary abnormality.   LOCATION:  [id number]      Dictated by (CST): Vinicius Baca MD on 1/16/2025 at 1:51 PM     Finalized by (CST): Vinicius Baca MD on 1/16/2025 at 1:51 PM      Urinalysis negative.  Did not show final.  Comprehensive shows available  chronic renal insufficiency, some dehydration  Medical Decision Making        Patient's case was discussed with the [place] hospitalist Dr. Joy.  He will admit the patient.  At this present time patient was given aspirin blood pressures come down nicely she has no complaints of headache, chest pain, shortness of breath older person who had a syncopal episode possible dizziness with no obvious seizure.  Felt to be reasonable to admit for serial cardiac enzymes and further evaluation.  At this present time no obvious findings of acute coronary syndrome, CVA but possibilities include that she might of had hypotensive episode where she got dizzy.  Was also considered.    Disposition and Plan     Clinical Impression:  1. Syncope and collapse         Disposition:  Admit  1/16/2025  4:52 pm    Follow-up:  No follow-up provider specified.        Medications Prescribed:  Current Discharge Medication List              Supplementary Documentation:         Hospital Problems       Present on Admission  Date Reviewed: 8/28/2024            ICD-10-CM Noted POA    * (Principal) Syncope and collapse R55 1/16/2025 Unknown

## 2025-01-17 ENCOUNTER — APPOINTMENT (OUTPATIENT)
Dept: CV DIAGNOSTICS | Facility: HOSPITAL | Age: OVER 89
End: 2025-01-17
Attending: STUDENT IN AN ORGANIZED HEALTH CARE EDUCATION/TRAINING PROGRAM
Payer: MEDICARE

## 2025-01-17 LAB
ALBUMIN SERPL-MCNC: 4 G/DL (ref 3.2–4.8)
ALBUMIN/GLOB SERPL: 1.7 {RATIO} (ref 1–2)
ALP LIVER SERPL-CCNC: 86 U/L
ALT SERPL-CCNC: 12 U/L
ANION GAP SERPL CALC-SCNC: 9 MMOL/L (ref 0–18)
AST SERPL-CCNC: 22 U/L (ref ?–34)
BASOPHILS # BLD AUTO: 0.04 X10(3) UL (ref 0–0.2)
BASOPHILS NFR BLD AUTO: 0.7 %
BILIRUB SERPL-MCNC: 0.4 MG/DL (ref 0.2–0.9)
BUN BLD-MCNC: 19 MG/DL (ref 9–23)
CALCIUM BLD-MCNC: 9.7 MG/DL (ref 8.7–10.6)
CHLORIDE SERPL-SCNC: 110 MMOL/L (ref 98–112)
CO2 SERPL-SCNC: 22 MMOL/L (ref 21–32)
CREAT BLD-MCNC: 1.14 MG/DL
EGFRCR SERPLBLD CKD-EPI 2021: 45 ML/MIN/1.73M2 (ref 60–?)
EOSINOPHIL # BLD AUTO: 0.24 X10(3) UL (ref 0–0.7)
EOSINOPHIL NFR BLD AUTO: 4.2 %
ERYTHROCYTE [DISTWIDTH] IN BLOOD BY AUTOMATED COUNT: 12.3 %
GLOBULIN PLAS-MCNC: 2.3 G/DL (ref 2–3.5)
GLUCOSE BLD-MCNC: 107 MG/DL (ref 70–99)
HCT VFR BLD AUTO: 39.1 %
HGB BLD-MCNC: 12.8 G/DL
IMM GRANULOCYTES # BLD AUTO: 0.02 X10(3) UL (ref 0–1)
IMM GRANULOCYTES NFR BLD: 0.4 %
INR BLD: 0.98 (ref 0.8–1.2)
LYMPHOCYTES # BLD AUTO: 0.87 X10(3) UL (ref 1–4)
LYMPHOCYTES NFR BLD AUTO: 15.2 %
MAGNESIUM SERPL-MCNC: 1.8 MG/DL (ref 1.6–2.6)
MCH RBC QN AUTO: 31.7 PG (ref 26–34)
MCHC RBC AUTO-ENTMCNC: 32.7 G/DL (ref 31–37)
MCV RBC AUTO: 96.8 FL
MONOCYTES # BLD AUTO: 0.46 X10(3) UL (ref 0.1–1)
MONOCYTES NFR BLD AUTO: 8.1 %
NEUTROPHILS # BLD AUTO: 4.08 X10 (3) UL (ref 1.5–7.7)
NEUTROPHILS # BLD AUTO: 4.08 X10(3) UL (ref 1.5–7.7)
NEUTROPHILS NFR BLD AUTO: 71.4 %
OSMOLALITY SERPL CALC.SUM OF ELEC: 295 MOSM/KG (ref 275–295)
PHOSPHATE SERPL-MCNC: 2.6 MG/DL (ref 2.4–5.1)
PLATELET # BLD AUTO: 193 10(3)UL (ref 150–450)
POTASSIUM SERPL-SCNC: 4.2 MMOL/L (ref 3.5–5.1)
PROT SERPL-MCNC: 6.3 G/DL (ref 5.7–8.2)
PROTHROMBIN TIME: 13.1 SECONDS (ref 11.6–14.8)
RBC # BLD AUTO: 4.04 X10(6)UL
SODIUM SERPL-SCNC: 141 MMOL/L (ref 136–145)
WBC # BLD AUTO: 5.7 X10(3) UL (ref 4–11)

## 2025-01-17 PROCEDURE — 93306 TTE W/DOPPLER COMPLETE: CPT | Performed by: STUDENT IN AN ORGANIZED HEALTH CARE EDUCATION/TRAINING PROGRAM

## 2025-01-17 PROCEDURE — 99232 SBSQ HOSP IP/OBS MODERATE 35: CPT | Performed by: INTERNAL MEDICINE

## 2025-01-17 RX ORDER — HYDRALAZINE HYDROCHLORIDE 20 MG/ML
5 INJECTION INTRAMUSCULAR; INTRAVENOUS EVERY 4 HOURS PRN
Status: DISCONTINUED | OUTPATIENT
Start: 2025-01-17 | End: 2025-01-18

## 2025-01-17 RX ORDER — MAGNESIUM OXIDE 400 MG/1
400 TABLET ORAL ONCE
Status: COMPLETED | OUTPATIENT
Start: 2025-01-17 | End: 2025-01-17

## 2025-01-17 RX ORDER — LOSARTAN POTASSIUM 25 MG/1
50 TABLET ORAL DAILY
Status: DISCONTINUED | OUTPATIENT
Start: 2025-01-18 | End: 2025-01-18

## 2025-01-17 RX ORDER — LOSARTAN POTASSIUM 25 MG/1
25 TABLET ORAL ONCE
Status: COMPLETED | OUTPATIENT
Start: 2025-01-17 | End: 2025-01-17

## 2025-01-17 NOTE — ED QUICK NOTES
Orders for admission, patient is aware of plan and ready to go upstairs. Any questions, please call ED RN Eze  at extension [phone].     Patient Covid vaccination status: Fully vaccinated     COVID Test Ordered in ED: None    COVID Suspicion at Admission: N/A    Running Infusions:    sodium chloride 125 mL/hr (01/16/25 1803)        Mental Status/LOC at time of transport: A xO 3    Other pertinent information:   CIWA score: N/A   NIH score:  N/A

## 2025-01-17 NOTE — PHYSICAL THERAPY NOTE
LOV: 2/23/24  FOV: 8/23/24   PHYSICAL THERAPY EVALUATION - INPATIENT     Room Number: 0013/0013-A  Evaluation Date: 1/17/2025  Type of Evaluation: Initial  Physician Order: PT Eval and Treat    Presenting Problem: transient loss of consciousness; fall  Co-Morbidities : galucoma, HTN, HLD, hx of recurrent falls  Reason for Therapy: Mobility Dysfunction and Discharge Planning    PHYSICAL THERAPY ASSESSMENT   Patient is a 89 year old female admitted 1/16/2025 for transient loss of consciousness while walking.  Prior to admission, patient's baseline is mod I; ambulatory with rollator.  Patient is currently functioning near baseline with bed mobility, transfers, and gait.  Patient is requiring stand-by assist and contact guard assist as a result of the following impairments: decreased functional strength, impaired dynamic standing balance, decreased muscular endurance, and impaired vision .  Physical Therapy will continue to follow for duration of hospitalization.    Patient will benefit from continued skilled PT Services at discharge to promote prior level of function and safety with additional support and return home with home health PT.    PLAN DURING HOSPITALIZATION  Nursing Mobility Recommendation : 1 Assist  PT Device Recommendation: Rolling walker  PT Treatment Plan: Bed mobility;Body mechanics;Endurance;Energy conservation;Patient education;Family education;Gait training;Range of motion;Strengthening;Stoop training;Stair training;Transfer training;Balance training  Rehab Potential : Fair  Frequency (Obs): 3x/week     CURRENT GOALS    Goal #1 Patient is able to demonstrate supine - sit EOB @ level: modified independent     Goal #2 Patient is able to demonstrate transfers Sit to/from Stand at assistance level: modified independent     Goal #3 Patient is able to ambulate 150 feet with assist device: walker - rolling at assistance level: supervision     Goal #4 Pt is able to ascend/descend at least 2 stairs with railing and supervision   Goal #5     Goal #6    Goal Comments: Goals established on 2025      PHYSICAL THERAPY MEDICAL/SOCIAL HISTORY  History related to current admission: Patient is a 89 year old female admitted on 2025 from home for syncope while walking.    HOME SITUATION  Type of Home: House  Home Layout: One level  Stairs to Enter : 2   Railing: Yes    Stairs to Bedroom: 0         Lives With: Alone        Patient Regularly Uses: Rollator      Prior Level of [place]: Pt typically mod I; ambulates with RW. Pt lives alone; family nearby. Spoke with dtr who states that she is setting up caregiver services for pt 5x/week and she and her brother will each be with pt 1 day/week so pt with have someone with her part-time 7 days/week.     SUBJECTIVE  Pt states, \" Did I walk enough?\"    OBJECTIVE  Precautions: Bed/chair alarm;Hard of hearing;Low vision  Fall Risk: High fall risk    WEIGHT BEARING RESTRICTION     PAIN ASSESSMENT  Ratin  Location: pt denies       COGNITION  Following Commands:  follows one step commands consistently  Awareness of Errors:  assistance required to correct errors made    RANGE OF MOTION AND STRENGTH ASSESSMENT  Upper extremity ROM and strength are within functional limits; shoulder elevation not fully assessed    Lower extremity ROM is within functional limits     Lower extremity strength is within functional limits     BALANCE  Static Sitting: Good  Dynamic Sitting: Fair +  Static Standing: Fair  Dynamic Standing: Fair -    ADDITIONAL TESTS                                    ACTIVITY TOLERANCE      BP in supine @ 12:39 pm = 174/70  BP in sitting @ 12:42 pm - 164/84  BP in standing @ 12:45 pm = 160/88  Pt denies any dizziness or lightheadedness                   O2 WALK  Oxygen Therapy  SPO2% on Room Air at Rest: 94    NEUROLOGICAL FINDINGS                        AM-PAC '6-Clicks' INPATIENT SHORT FORM - BASIC MOBILITY  How much difficulty does the patient currently have...  Patient Difficulty: Turning over  in bed (including adjusting bedclothes, sheets and blankets)?: None   Patient Difficulty: Sitting down on and standing up from a chair with arms (e.g., wheelchair, bedside commode, etc.): A Little   Patient Difficulty: Moving from lying on back to sitting on the side of the bed?: None   How much help from another person does the patient currently need...   Help from Another: Moving to and from a bed to a chair (including a wheelchair)?: A Little   Help from Another: Need to walk in hospital room?: A Little   Help from Another: Climbing 3-5 steps with a railing?: A Little     AM-PAC Score:  Raw Score: 20   Approx Degree of Impairment: 35.83%   Standardized Score (AM-PAC Scale): 47.67   CMS Modifier (G-Code): CJ    FUNCTIONAL ABILITY STATUS  Gait Assessment   Functional Mobility/Gait Assessment  Gait Assistance: Contact guard assist  Distance (ft): 25  Assistive Device: Rolling walker    Skilled Therapy Provided     Bed Mobility:  Supine to sit: supervision   Sit to supine: NT     Transfer Mobility:  Sit to stand: CGA first attempt then SBA second attempt; cues for proper hand placement   Stand to sit: CGA with cues for hand placement  Gait = Pt ambulated 25' with RW and CGA with cues to navigate around obstacles in room to get to/from bathroom    Therapist's Comments: Spoke with pt's dtr outside of room. Pt lying in bed and agreeable to PT.  BP checked in supine, sitting, and standing. Pt with soiled brief and bowel movement once standing. Pt stood several minutes at EOB with CGA/SBA. Pt then ambulated to bathroom to wash hands and then returned to sitting EOB for additional shannen care per PCT request. Pt required additional cuing to locate sink in bathroom due to impaired vision.   Pt sitting EOB with RN and PCT at end of session.     Exercise/Education Provided:  Body mechanics  Functional activity tolerated  Gait training  Posture  Transfer training    Patient End of Session: In bed;With  staff;Needs met;Call  light within reach;RN aware of session/findings;All patient questions and concerns addressed;Hospital anti-slip socks      Patient Evaluation Complexity Level:  History Moderate - 1 or 2 personal factors and/or co-morbidities   Examination of body systems Low -  addressing 1-2 elements   Clinical Presentation Low- Stable   Clinical Decision Making Low Complexity       PT Session Time: 36 minutes  Gait Trainin minutes  Therapeutic Activity: 15 minutes

## 2025-01-17 NOTE — CM/SW NOTE
01/17/25 1100   CM/SW Referral Data   Referral Source Physician   Reason for Referral Discharge planning   Informant EMR;Daughter   Medical Hx   Does patient have an established PCP? Yes  (Camilo Dietrich MD)   Significant Past Medical/Mental Health Hx Hx of chronic back pain, glaucoma, actinic keratosis   Patient Info   Advanced directives? Yes   Patient lives with Alone   Patient Status Prior to Admission   Services in place prior to admission DME/Supplies at home   Type of DME/Supplies Standard Walker   Discharge Needs   Anticipated D/C needs To be determined     CM met w/ pt's dtr Abby at bedside. (Pt asleep at time of assessment). Pt lives alone, does not drive but is otherwise independent with ADLs using a walker. Pt lives in [place]. Pt's dtr Abby lives in [place] and her son is also local. Pt's children drive her for appointments and assist w/ getting medications/ groceries.     D/w pt's dtr that PT eval is pending and pt may benefit from HHC services. Pt/dtr are open to services if recommended. Pt's dtr is also interested in caregiver services for additional support at home. Per dtr, pt has long-term care insurance, that should assist w/ paying for caregivers. [facility] rep Leizabeth called and she will meet w/ pt/dtr today to assist with setting up caregiver services. D/w pt's dtr Abby. Contact information for Elizaebth with [facility] also provided. Pt/ family are also considering potential FDC in the future, d/w pt's dtr that Elizabeth will be able to assist w/ this transition when desired as well.     Addendum 1100: Call received from Elizabeth w/ [facility]. She met w/ pt/dtr and plans on setting up caregiver services for tomorrow. Pt's dtr Abby plans on staying overnight w/ pt tonight.     CM will f/u for PT eval and assist with any additional needs for DC.     Addendum 1355: CM notified pt has anticipated need for [facility] for therapy services. Referrals sent via [employer]. CM/SW will follow up to provide pt/dtr list of agencies for  services.     1600: HH list with contact for CM and SW for the weekend placed at pt's bedside. Pt's dtr Abby called and list also emailed to Abby at [email or web address]    MARY KATE Alvarado, CMSRN    [id number]

## 2025-01-17 NOTE — DIETARY NOTE
[facility]   part of formerly [facility]   CLINICAL NUTRITION    Clary Quinones     Admitting diagnosis:  Syncope and collapse [R55]    Ht: 160 cm (5' 3\")  Wt: 61.2 kg (135 lb).   Body mass index is 23.91 kg/m².  IBW: 52.3kg  UBW: 140-150# per dtr    Wt Readings from Last 6 Encounters:   01/16/25 61.2 kg (135 lb)   08/28/24 61.5 kg (135 lb 9.6 oz)   08/09/24 65.8 kg (145 lb)   07/17/24 65.8 kg (145 lb)   07/16/24 65.8 kg (145 lb)   04/10/24 64.9 kg (143 lb)        Labs/Meds reviewed    Diet:       Procedures    Regular/General diet Is Patient on Accuchecks? No; Misc Restriction: Cardiac     Percent Meals Eaten (last 3 days)       None            Pt chart reviewed d/t +MST.  Patient reports fair appetite at this time. Diet liberalized. Added ONS daily  Nursing notes reports   intake for last meal.   Tolerating po diet without diarrhea, emesis, or constipation. Pt denies trouble chewing or swallwoing.   No significant weight changes noted. Pt lives alone, plan for additional help at home at discharge.    Patient is at low nutrition risk at this time.    Please consult if patient status changes or nutrition issues arise.    Elizabeth Ruby RD, LDN  Clinical Nutrition  [id number]

## 2025-01-17 NOTE — CONSULTS
[facility]  Consultation    Clarykirk Quinones Patient Status:  Observation    1930 MRN [id number]   Location [facility] 0SW-A Attending Bhupendra Gallegos MD   Hosp Day # 0 PCP Camilo Perez MD       Reason for consultation;  Syncope  FD     HPI:  This is a 89 year old female patient with PMH of  HTN, HLP who presented after syncope. She got up from her chair, turned around, and found her self on the floor. Pt is [place] and it is difficult for her to provide history. Daughter was able to provide history, and reports the patient had several episodes in the past where she fell down without clear cause.    MDM / Diagnostics reviewed & interpreted:  ECG shows NSR, LVH  Trp negative  Tele w/o significant arrhythmia    Assessment:    Syncope  HTN  DLP    Plan:  -Unclear cause of syncope, though labile BP and orthostasis are possible contributors.   -Continue tele monitoring  -TTE  -restart home anti-HTN, seems poorly controlled at present  -Plan for outpt MCT x 2 weeks    Discussed with patient.     Please call with questions. Thank you for your consult.    Level of care: C5      Juan Robertson MD  Interventional Cardiology  [facility]    --------------------------------------------------------------------------------------------------------------------------------  ROS 10 systems reviewed, pertinent findings above.    History:  Past Medical History:    Back pain    3 months ago    Back problem    Glaucoma    High blood pressure    Hx of actinic keratosis    Per Dr Jose G Gupta - Actinic keratosis destroyed      Past Surgical History:   Procedure Laterality Date    Appendectomy  1950    Appendectomy      Cataract  2005    Cataract surgery, complex      Colonoscopy  1998    Colonoscopy  2014    Procedure: COLONOSCOPY;  Surgeon: Harshad Duron MD;  Location:  ENDOSCOPY    Diagnostic anoscopy      Hysterectomy       Family History   Problem Relation Age of  Onset    Cancer Father         Lung Cancer    Stroke Mother     Heart Attack Brother     Cancer Brother     Stroke Sister       reports that she has never smoked. She has never used smokeless tobacco. She reports that she does not currently use alcohol. She reports that she does not use drugs.    Objective:   Temp: 97.7 °F (36.5 °C)  Pulse: 58  Resp: 18  BP: 185/79    Intake/Output:     Intake/Output Summary (Last 24 hours) at 1/17/2025 0930  Last data filed at 1/17/2025 0630  Gross per 24 hour   Intake --   Output 1 ml   Net -1 ml       Physical Exam:     General: NAD. Conversant.   HEENT: Normocephalic, atraumatic.  Neck: Supple.  Cardiac: RRR. Normal S1, S2 . TORIN.  Lungs: GAEB, no wheezing.  GI: Soft, non-distended  Extremities: Rt radial pulses 2+. No edema.  Skin: Warm and dry.      Juan Robertson MD  1/17/2025  9:30 AM

## 2025-01-17 NOTE — PHYSICAL THERAPY NOTE
PT order received and chart reviewed. Attempted to see for PT eval but pt receiving echo. Plan to follow up as schedule allows

## 2025-01-17 NOTE — PROGRESS NOTES
NURSING ADMISSION NOTE      Patient admitted via Cart  Oriented to room.  Safety precautions initiated.  Bed in low position.  Call light in reach.  Up to date w POC  Navigator completed  PTA meds reviewed w patient and family  NS @100ml/hr  Takes meds whole  Cardiac diet  Non cardiac protocol  Consult cardiology in AM  Will continue to meet pt needs  Need further details reference flowsheets

## 2025-01-17 NOTE — PLAN OF CARE
Assumed care of patient around 0730.Patient AXOX4.NSR with 1 degree block.Seen by cardiology.2 D echo today.Call light within reach.Plan of care updated.Daughter at bedside.Fall precautions maintained.  Problem: CARDIOVASCULAR - ADULT  Goal: Maintains optimal cardiac output and hemodynamic stability  Description: INTERVENTIONS:  - Monitor vital signs, rhythm, and trends  - Monitor for bleeding, hypotension and signs of decreased cardiac output  - Evaluate effectiveness of vasoactive medications to optimize hemodynamic stability  - Monitor arterial and/or venous puncture sites for bleeding and/or hematoma  - Assess quality of pulses, skin color and temperature  - Assess for signs of decreased coronary artery perfusion - ex. Angina  - Evaluate fluid balance, assess for edema, trend weights  Outcome: Progressing  Goal: Absence of cardiac arrhythmias or at baseline  Description: INTERVENTIONS:  - Continuous cardiac monitoring, monitor vital signs, obtain 12 lead EKG if indicated  - Evaluate effectiveness of antiarrhythmic and heart rate control medications as ordered  - Initiate emergency measures for life threatening arrhythmias  - Monitor electrolytes and administer replacement therapy as ordered  Outcome: Progressing

## 2025-01-17 NOTE — PROGRESS NOTES
[facility]   part of [facility]     Hospitalist Progress Note     Clary Quinones Patient Status:  Observation    1930 MRN [id number]   Location [facility] 0SW-A Attending Bhupendra Gallegos MD   Hosp Day # 0 PCP Camilo Perez MD     Chief Complaint: syncope    Subjective:     Patient without acute events overnight. No CP or SOB. No dizziness.     Objective:    Review of Systems:   A comprehensive review of systems was completed; pertinent positive and negatives stated in subjective.    Vital signs:  Temp:  [97.7 °F (36.5 °C)-98.4 °F (36.9 °C)] 97.8 °F (36.6 °C)  Pulse:  [50-84] 52  Resp:  [14-19] 18  BP: (131-201)/(60-96) 160/63  SpO2:  [93 %-100 %] 93 %    Physical Exam:    General: No acute distress  Respiratory: No wheezes, no rhonchi  Cardiovascular: S1, S2, regular rate and rhythm  Abdomen: Soft, Non-tender, non-distended, positive bowel sounds  Neuro: No new focal deficits.   Extremities: No edema      Diagnostic Data:    Labs:  Recent Labs   Lab 25  1319 25  0733   WBC 5.6 5.7   HGB 13.1 12.8   MCV 96.2 96.8   .0 193.0   INR  --  0.98       Recent Labs   Lab 25  1319 25  0745   * 107*   BUN 29* 19   CREATSERUM 1.30* 1.14*   CA 10.9* 9.7   ALB 4.6 4.0    141   K 4.0 4.2    110   CO2 28.0 22.0   ALKPHO 91 86   AST 22 22   ALT 12 12   BILT 0.4 0.4   TP 7.4 6.3       Estimated Glomerular Filtration Rate: 44.7 mL/min/1.73m2 (A) (by CKD-EPI based on SCr of 1.14 mg/dL (H)).    Recent Labs   Lab 25  1319   TROPHS 5       Recent Labs   Lab 25  0733   PTP 13.1   INR 0.98                  Microbiology    No results found for this visit on 25.      Imaging: Reviewed in Epic.    Medications:    dorzolamide-timolol  1 drop Both Eyes BID    pravastatin  20 mg Oral Nightly    losartan  25 mg Oral Daily    [Held by provider] Mirabegron ER  50 mg Oral Daily    prednisoLONE  1 drop Right Eye BID    Netarsudil-Latanoprost  1 drop Both  Eyes Nightly    heparin  5,000 Units Subcutaneous Q8H [facility]       Assessment & Plan:      # Transient loss of consciousness                 - Orthostatics ordered, IVF    - EKG NSR with 1st degree AV block, no ST-elevations/depressions or TWI   - CT Head and CT c-spine without acute findings   - MRI brain without acute CVA or ICH, mild prominence of lateral ventricles, possible NPH? Though clinically less likely; recommended outpatient neurology f/u    - TTE ordered   - Monitor on tele, can consider holter at DC given AV block    - Cardiology on consult   - PT/OT eval     # Hypertension - Continue home oral antihypertensives  # Hyperlipidemia - continue home statin       Bhupendra Gallegos MD    Supplementary Documentation:     Quality:  DVT Mechanical Prophylaxis:   SCDs, Early ambuation  DVT Pharmacologic Prophylaxis   Medication    heparin (Porcine) 5000 UNIT/ML injection 5,000 Units                Code Status: Prior  Carr: No urinary catheter in place  Carr Duration (in days):   Central line:    MICHI:     Discharge is dependent on: progress  At this point Ms. Quinones is expected to be discharge to: home    The 21st Century Cures Act makes medical notes like these available to patients in the interest of transparency. Please be advised this is a medical document. Medical documents are intended to carry relevant information, facts as evident, and the clinical opinion of the practitioner. The medical note is intended as peer to peer communication and may appear blunt or direct. It is written in medical language and may contain abbreviations or verbiage that are unfamiliar.              **Certification      PHYSICIAN Certification of Need for Inpatient Hospitalization - Initial Certification    Patient will require inpatient services that will reasonably be expected to span two midnight's based on the clinical documentation in H+P.   Based on patients current state of illness, I anticipate that, after discharge,  patient will require TBD.

## 2025-01-17 NOTE — PROGRESS NOTES
01/17/25 1230 01/17/25 1243 01/17/25 1245   Vital Signs   BP (!) 174/70 (!) 164/84 160/88   MAP (mmHg) 99 (!) 108 (!) 110   BP Location Right arm Right arm Right arm   BP Method Automatic Automatic Automatic   Patient Position Lying Sitting Standing

## 2025-01-18 VITALS
BODY MASS INDEX: 23.92 KG/M2 | TEMPERATURE: 98 F | SYSTOLIC BLOOD PRESSURE: 159 MMHG | DIASTOLIC BLOOD PRESSURE: 62 MMHG | OXYGEN SATURATION: 97 % | HEIGHT: 63 IN | WEIGHT: 135 LBS | HEART RATE: 78 BPM | RESPIRATION RATE: 17 BRPM

## 2025-01-18 LAB
ANION GAP SERPL CALC-SCNC: 7 MMOL/L (ref 0–18)
BUN BLD-MCNC: 19 MG/DL (ref 9–23)
CALCIUM BLD-MCNC: 9.4 MG/DL (ref 8.7–10.6)
CHLORIDE SERPL-SCNC: 113 MMOL/L (ref 98–112)
CO2 SERPL-SCNC: 23 MMOL/L (ref 21–32)
CREAT BLD-MCNC: 1.16 MG/DL
EGFRCR SERPLBLD CKD-EPI 2021: 44 ML/MIN/1.73M2 (ref 60–?)
GLUCOSE BLD-MCNC: 107 MG/DL (ref 70–99)
MAGNESIUM SERPL-MCNC: 1.8 MG/DL (ref 1.6–2.6)
OSMOLALITY SERPL CALC.SUM OF ELEC: 299 MOSM/KG (ref 275–295)
POTASSIUM SERPL-SCNC: 4 MMOL/L (ref 3.5–5.1)
SODIUM SERPL-SCNC: 143 MMOL/L (ref 136–145)

## 2025-01-18 PROCEDURE — 99239 HOSP IP/OBS DSCHRG MGMT >30: CPT | Performed by: HOSPITALIST

## 2025-01-18 RX ORDER — PREDNISOLONE ACETATE 10 MG/ML
1 SUSPENSION/ DROPS OPHTHALMIC 2 TIMES DAILY
Status: SHIPPED | COMMUNITY
Start: 2025-01-18

## 2025-01-18 RX ORDER — NETARSUDIL AND LATANOPROST OPHTHALMIC SOLUTION, 0.02%/0.005% .2; .05 MG/ML; MG/ML
1 SOLUTION/ DROPS OPHTHALMIC; TOPICAL NIGHTLY
Status: SHIPPED | COMMUNITY
Start: 2025-01-18

## 2025-01-18 RX ORDER — LOSARTAN POTASSIUM 50 MG/1
50 TABLET ORAL DAILY
Qty: 30 TABLET | Refills: 0 | Status: SHIPPED | OUTPATIENT
Start: 2025-01-18 | End: 2025-01-21

## 2025-01-18 RX ORDER — ATROPINE SULFATE 10 MG/ML
1 SOLUTION/ DROPS OPHTHALMIC 2 TIMES DAILY
Status: SHIPPED | COMMUNITY
Start: 2025-01-18

## 2025-01-18 RX ORDER — MAGNESIUM OXIDE 400 MG/1
400 TABLET ORAL ONCE
Status: COMPLETED | OUTPATIENT
Start: 2025-01-18 | End: 2025-01-18

## 2025-01-18 NOTE — DISCHARGE SUMMARY
[place] [facility]  DISCHARGE SUMMARY     Clary Quinones Patient Status:  Observation    1930 MRN [id number]   Location [facility] 0SW-A Attending Josh Ruby, DO   Hosp Day # 0 PCP Camilo Perez MD     Date of Admission: 2025  Date of Discharge:   2025    Discharge Disposition: Home or Self Care    Discharge Diagnosis:  Syncope  Essential HTN  Dyslipidemia     History of Present Illness: Clary Quinones is a 89 year old female with no known PMHx who presents for syncope.     Patient was in usual state of health today when she was sitting on the rocking chair, attempted to get up and go to the kitchen, was using her rolling walker and while walking had sudden loss of consciousness.  She does not remember events leading to this, denies any chest pain, palpitations, lightheadedness, dizziness, shortness of breath prior to syncopal episode.  Patient does have a history of recurrent falls.    Brief Synopsis:     # Syncope  -Unclear etiology though without recurrence so far   -Orthostatics negative   -CT Head and CT c-spine without acute findings  -MRI brain without acute CVA or ICH  -Echo without acute findings   -Cardiology following - plan for MCT at discharge   -PT/OT recommending HHPT     #Hypertension   -Controlled    # Hyperlipidemia  -Statin     #Disposition  -Stable for DC home     Lace+ Score: 55  59-90 High Risk  29-58 Medium Risk  0-28   Low Risk       TCM Follow-Up Recommendation:  LACE 29-58: Moderate Risk of readmission after discharge from the hospital.      Procedures during hospitalization:   None    Incidental or significant findings and recommendations (brief descriptions):  None    Lab/Test results pending at Discharge:   None    Consultants:  Cardiology    Discharge Medication List:     Discharge Medications        CHANGE how you take these medications        Instructions Prescription details   losartan 50 MG Tabs  Commonly known as: Cozaar  What changed:    medication strength  how much to take      Take 1 tablet (50 mg total) by mouth daily.   Quantity: 30 tablet  Refills: 0            CONTINUE taking these medications        Instructions Prescription details   Acetaminophen Extra Strength 500 MG Tabs      Take 2 tablets (1,000 mg total) by mouth every 6 (six) hours.   Refills: 0     atropine 1 % Soln  Commonly known as: Atropine-Care      Place 1 drop into both eyes in the morning and 1 drop before bedtime.   Refills: 0     Calcium Carbonate Antacid 600 MG Chew      Chew 600 mg by mouth.   Refills: 0     Diclofenac Sodium 1 % Gel  Commonly known as: Voltaren      Apply 4 g topically daily as needed.   Quantity: 500 g  Refills: 2     dorzolamide-timolol 2-0.5 % Soln  Commonly known as: Cosopt      Place 1 drop into both eyes 2 (two) times daily.   Refills: 0     Lovastatin 20 MG Tabs      TAKE 1 TABLET(20 MG) BY MOUTH EVERY EVENING   Quantity: 90 tablet  Refills: 0     MULTIVITAMIN & MINERAL OR      Take 1 tablet by mouth daily.   Refills: 0     Myrbetriq 50 MG Tb24  Generic drug: Mirabegron ER      Take 1 tablet (50 mg total) by mouth daily.   Quantity: 90 tablet  Refills: 3     niacin 500 MG Tabs      Take 1 tablet (500 mg total) by mouth daily with breakfast.   Refills: 0     omega-3 fatty acids 1000 MG Caps  Commonly known as: Fish Oil      Take 1,000 mg by mouth daily.   Refills: 0     polyethylene glycol (PEG 3350) 17 g Pack  Commonly known as: Miralax      Take 17 g by mouth daily.   Refills: 0     prednisoLONE 1 % Susp  Commonly known as: Pred Forte      Place 1 drop into the right eye in the morning and 1 drop before bedtime.   Refills: 0     PROBIOTIC-10 OR      Take by mouth.   Refills: 0     Rocklatan 0.02-0.005 % Soln  Generic drug: Netarsudil-Latanoprost      Place 1 drop into the right eye nightly.   Refills: 0            STOP taking these medications      cyclopentolate 1 % Soln  Commonly known as: Cyclogyl                  Where to Get Your  Medications        These medications were sent to [id number] - [place], [place] - [address] AT [facility] [address] [place] [address] [place] [address], [phone], [phone]  [address], [facility] [address]      Phone: [phone]   losartan 50 MG Tabs         [place]    Follow-up appointment:   Dylon Brothers MD  [address]  [address]  [place] [address]  [phone]    Follow up in 1 week(s)      Camilo Perez MD  [address]  [place] [address]  [phone]    Follow up in 1 week(s)      Appointments for Next 30 Days 2025 - 2025      None            Vital signs:  Temp:  [97.9 °F (36.6 °C)-98.9 °F (37.2 °C)] 98.2 °F (36.8 °C)  Pulse:  [57-89] 78  Resp:  [17-19] 17  BP: (122-166)/(58-82) 159/62  SpO2:  [96 %-99 %] 97 %    Physical Exam:    General: No acute distress   Lungs: clear to auscultation  Cardiovascular: S1, S2  Abdomen: Soft    -----------------------------------------------------------------------------------------------  PATIENT DISCHARGE INSTRUCTIONS: See electronic chart    Josh Ruby DO    Total time spent on discharge plannin minutes     The 21st Century Cures Act makes medical notes like these available to patients in the interest of transparency. Please be advised this is a medical document. Medical documents are intended to carry relevant information, facts as evident, and the clinical opinion of the practitioner. The medical note is intended as peer to peer communication and may appear blunt or direct. It is written in medical language and may contain abbreviations or verbiage that are unfamiliar.

## 2025-01-18 NOTE — HOME CARE LIAISON
Referral received from SW/CM team via A[facility]. Discussed with patient's dtr Abby the recommendation for home health services, patient/dtr agreeable, and all questions answered.

## 2025-01-18 NOTE — PLAN OF CARE
Received pt alert and coherent but sometimes forgetful. [place] with bilat hearing aids. RA, VSS. 1 assist with walker and uses commode. F/u to out patient neuro. Planning to consider MCT x 2 weeks once d/ c'd. Bed in lowest position, bed alarm on. Safety and fall prec maintain. POC on-going      Problem: CARDIOVASCULAR - ADULT  Goal: Maintains optimal cardiac output and hemodynamic stability  Description: INTERVENTIONS:  - Monitor vital signs, rhythm, and trends  - Monitor for bleeding, hypotension and signs of decreased cardiac output  - Evaluate effectiveness of vasoactive medications to optimize hemodynamic stability  - Monitor arterial and/or venous puncture sites for bleeding and/or hematoma  - Assess quality of pulses, skin color and temperature  - Assess for signs of decreased coronary artery perfusion - ex. Angina  - Evaluate fluid balance, assess for edema, trend weights  Outcome: Progressing  Goal: Absence of cardiac arrhythmias or at baseline  Description: INTERVENTIONS:  - Continuous cardiac monitoring, monitor vital signs, obtain 12 lead EKG if indicated  - Evaluate effectiveness of antiarrhythmic and heart rate control medications as ordered  - Initiate emergency measures for life threatening arrhythmias  - Monitor electrolytes and administer replacement therapy as ordered  Outcome: Progressing

## 2025-01-18 NOTE — PLAN OF CARE
NURSING DISCHARGE NOTE    Discharged Home via Wheelchair.  Accompanied by Support staff  Belongings Taken by patient/family.    Patient is stable to discharge home. Medically cleared by cards and the hospitalist. Reviewed discharge instructions about medications and post-discharge follow up visits with the patient and pt's dtr at bedside. Both verbalized understanding. Questions and concerns addressed. PIV line dc'ed, pressure and dressing applied. Clean/dry/intact. Discharge navigator completed. Discharge AVS printed out and given to patient. Tele box removed, cleaned, and returned to the appropriate location. All patient's belongings sent with patient.

## 2025-01-18 NOTE — CM/SW NOTE
SW received a call from patient's daughter requesting that [facility] be reserved. SW reserved [facility] in [facility] and updated AVS.     SW will continue to follow for plan of care changes and remain available for any additional DC needs or concerns.     Lisa Sánchez MSW, LSW  Discharge Planner   [id number]

## 2025-01-18 NOTE — DISCHARGE INSTRUCTIONS
Sometimes managing your health at home requires assistance.  The [facility] team has recognized your preference to use [facility].  They can be reached by phone at [phone].  The fax number for your reference is [phone].. A representative from the home health agency will contact you or your family to schedule your first visit.

## 2025-01-18 NOTE — PROGRESS NOTES
Progress Note  Clary Quinones Patient Status:  Observation    1930 MRN [id number]   Location [facility] 0SW-A Attending Josh Ruby,    Hosp Day # 0 PCP Camilo Perez MD     Subjective:  Pt stated to be feeling better than yesterday. Stated yesterday she was up to the bathroom multiple times and she was tired. Denies any chest pain, SOB or dizziness.     Objective:  /67 (BP Location: Right arm)   Pulse 58   Temp 98.5 °F (36.9 °C) (Oral)   Resp 17   Ht 5' 3\" (1.6 m)   Wt 135 lb (61.2 kg)   SpO2 96%   BMI 23.91 kg/m²     Telemetry: NSR with 1st degree AVB      Intake/Output:    Intake/Output Summary (Last 24 hours) at 2025 0639  Last data filed at 2025 0445  Gross per 24 hour   Intake 300 ml   Output 900 ml   Net -600 ml       Last 3 Weights   25 2203 135 lb (61.2 kg)   25 1318 135 lb (61.2 kg)   24 0925 135 lb 9.6 oz (61.5 kg)   24 1103 145 lb (65.8 kg)       Labs:  Recent Labs   Lab 25  1319 25  0745   * 107*   BUN 29* 19   CREATSERUM 1.30* 1.14*   EGFRCR 38* 45*   CA 10.9* 9.7    141   K 4.0 4.2    110   CO2 28.0 22.0     Recent Labs   Lab 25  1319 25  0733   RBC 4.16 4.04   HGB 13.1 12.8   HCT 40.0 39.1   MCV 96.2 96.8   MCH 31.5 31.7   MCHC 32.8 32.7   RDW 12.5 12.3   NEPRELIM 3.98 4.08   WBC 5.6 5.7   .0 193.0         Recent Labs   Lab 25  1319   TROPHS 5     Lab Results   Component Value Date    INR 0.98 2025       Diagnostics:     Review of Systems   Respiratory: Negative.     Cardiovascular: Negative.          Physical Exam:    Physical Exam  Vitals reviewed.   Constitutional:       Appearance: She is obese.   HENT:      Right Ear: Decreased hearing noted.      Left Ear: Decreased hearing noted.   Neck:      Vascular: No JVD.   Cardiovascular:      Rate and Rhythm: Normal rate and regular rhythm.      Pulses: Normal pulses.      Heart sounds: Normal heart sounds. No murmur  heard.     No friction rub. No gallop.   Pulmonary:      Effort: Pulmonary effort is normal. No respiratory distress.      Breath sounds: Normal breath sounds. No stridor. No wheezing, rhonchi or rales.   Chest:      Chest wall: No tenderness.   Abdominal:      Palpations: Abdomen is soft.      Tenderness: There is no abdominal tenderness. There is no guarding.   Musculoskeletal:      Cervical back: Normal range of motion.      Right lower leg: No edema.      Left lower leg: No edema.   Neurological:      Mental Status: She is alert and oriented to person, place, and time.   Psychiatric:         Mood and Affect: Mood normal.         Medications:   losartan  50 mg Oral Daily    dorzolamide-timolol  1 drop Both Eyes BID    pravastatin  20 mg Oral Nightly    [Held by provider] Mirabegron ER  50 mg Oral Daily    prednisoLONE  1 drop Right Eye BID    Netarsudil-Latanoprost  1 drop Both Eyes Nightly    heparin  5,000 Units Subcutaneous Q8H J[facility]      sodium chloride      sodium chloride 100 mL/hr at 01/18/25 0137       Assessment/Plan:    Syncope  - presented after having syncope episode. Pt stated she was dizzy after the episode, but doesn't recall if she got dizzy before the episode.   - no further episodes   - orthostatics negative   - CT head and MRI of brain negative for any acute findings   - echo from yesterday with LVEF 60-65%, no RWMA, mild aortic regurgitation  - ua negative   - trop x1 neg  - tele without any significant arrhythmia     HTN  - on losartan 50mg daily  - blood pressure mildly elevated this am, on IV fluids    DLP  - on pravastatin    Plan;  - stop IV fluids since pt's creatinine improved and blood pressure staying elevated   - check labs this am   - MCT for 2 weeks after discharge    Plan discussed with pt and RN     DONG Mata  [facility]  1/18/2025  6:39 AM\

## 2025-01-18 NOTE — CM/SW NOTE
Call received from [facility], who will be providing pt's caregiver services. Agency asked if CM/SW can fax DC AVS to agency when pt is discharged. Fax: [phone].    [facility]: [phone]    CM/SW will remain available for DC planning and/or support.     MARY KATE Alvarado, CMSRN    [id number]

## 2025-01-20 ENCOUNTER — PATIENT OUTREACH (OUTPATIENT)
Dept: CASE MANAGEMENT | Age: OVER 89
End: 2025-01-20

## 2025-01-20 DIAGNOSIS — E78.5 HYPERLIPIDEMIA LDL GOAL <100: ICD-10-CM

## 2025-01-20 RX ORDER — MIRABEGRON 50 MG/1
50 TABLET, FILM COATED, EXTENDED RELEASE ORAL DAILY
Qty: 90 TABLET | Refills: 3 | Status: SHIPPED | OUTPATIENT
Start: 2025-01-20

## 2025-01-20 NOTE — TELEPHONE ENCOUNTER
Requested Prescriptions     Signed Prescriptions Disp Refills    MYRBETRIQ 50 MG Oral Tablet 24 Hr 90 tablet 3     Sig: TAKE 1 TABLET(50 MG) BY MOUTH DAILY     Authorizing Provider: CLAU SPANN     Ordering User: AKIL GARCIA      Refilled per protocol/OV notes

## 2025-01-20 NOTE — PROGRESS NOTES
NCM called and spoke with patient briefly. Name and  verified. Patient states that she is doing fine since her discharge home from the hospital and thanked me for asking. NCM attempted to ask more questions but patient stated right now she is unable to talk and asked for a call back at another time. NCM advised will follow up at another time.

## 2025-01-21 ENCOUNTER — TELEPHONE (OUTPATIENT)
Dept: FAMILY MEDICINE CLINIC | Facility: CLINIC | Age: OVER 89
End: 2025-01-21

## 2025-01-21 DIAGNOSIS — I10 ESSENTIAL HYPERTENSION: ICD-10-CM

## 2025-01-21 RX ORDER — LOSARTAN POTASSIUM 100 MG/1
100 TABLET ORAL DAILY
Qty: 90 TABLET | Refills: 1 | Status: SHIPPED | OUTPATIENT
Start: 2025-01-21

## 2025-01-21 NOTE — TELEPHONE ENCOUNTER
Pt.'s daughter called back to let us know how Clary was doing. She stats her BP was 150/88 or 160/88 before taking her medication she wasn't sure. She just wanted to let Dr. Perez.

## 2025-01-21 NOTE — TELEPHONE ENCOUNTER
Spoke to patient for Transitions of Care call today.      During the call the patient reported she checked her blood pressure this morning and it was \"high\" but would not provide specific values to the Nurse Care Manager. Patient states that she knows it was high.The patient denies chest pain, shortness of breath, palpitations, fever, chills, nausea, vomiting, diarrhea, dizziness, lightheadedness, syncope, or near syncope.  I attempted to ask further questions however patient in regarding to her blood pressure but the patient declined further assessment.  Nurse Care Manager called and spoke with patient's daughter Abby. Abby states she will call her mother today to check and see what her blood pressure is and will call Dr. Perez's office to let them know. Abby states she will be the one bringing the patient in on Thursday 01/23 for her follow up with Dr. ePrez and confirmed the patient is scheduled for a follow up with Cardiology Friday 01/24/2025.     The patient is scheduled for a TCM/HFU with Dr. Perez on 01/23/2025.   The patient  is scheduled with her Cardiologist on 01/24/2025.     Clinical staff: Please advise and call patient's daughter Abby with further recommendations. Thank you!       Future Appointments   Date Time Provider Department Center   1/23/2025 10:30 AM Camilo Perez MD [facility]   2/27/2025  9:30 AM Camilo Perez MD [facility]   3/17/2025 10:15 AM Jose G Mora MD [facility]

## 2025-01-21 NOTE — TELEPHONE ENCOUNTER
Ok to increase losartan 50 to 100 mg, sent to pharmacy, byut can double up on what she has. Thanks and stay well, Romario Perez MD     Please notify:    Requested Prescriptions     Signed Prescriptions Disp Refills    losartan 100 MG Oral Tab 90 tablet 1     Sig: Take 1 tablet (100 mg total) by mouth daily.     Authorizing Provider: CLAU PEREZ        Sent to:      [facility] #[id number] - [address] [facility] [address], [phone], [phone]  [address] [place] [id number]  Phone: [phone] Fax: [phone]

## 2025-01-21 NOTE — PROGRESS NOTES
Transitions of Care Navigation  Discharge Date: 1/18/25  Contact Date: 1/20/2025    Transitions of Care Assessment:  WENDIE Initial Assessment    WENDIE Initial Assessment    General:  Assessment completed with: Patient  Patient Subjective: Spoke with patient who reports she has been feeling \"pretty good\". The reports this morning she checked her blood pressure and it was \"high\" but would not provide specific values to the Nurse Care Manager.The patient denies chest pain, shortness of breath, palpitations, fever, chills, nausea, vomiting, diarrhea, dizziness, lightheadedness, syncope, or near syncope. Using her walker to ambulate.The patient reports the Home Health RN came on Sunday. The patient states her son is taking her to her Cardiology appointment scheduled for this Friday 01/24/2025. I attempted to ask further questions however patient stated she just woke up and does not feel like continuing with this call-- she thanked me and disconnected. .  Chief Complaint: Syncope  Essential HTN  Dyslipidemia    Nurse Care Manager called and spoke with patient's daughter Abby. Abby states she will call her mother today to check and see what her blood pressure is and will call Dr. Perez's office to let them know. Abby states she will be the one bringing the patient in on Thursday 01/23 for her follow up with Dr. Perez and confirmed the patient is scheduled for a follow up with Cardiology Friday 01/24/2025. A telephone encounter has been send to the PCP office with a condition update

## 2025-01-21 NOTE — TELEPHONE ENCOUNTER
Daughter states patient is doing fine. BP is still elevated. Took her medication and will re check BP afterwards,     BP in the 150's/60's over 88.   Appointment scheduled for 1/23 with Dr. Perez.

## 2025-01-22 RX ORDER — LOVASTATIN 20 MG/1
20 TABLET ORAL EVERY EVENING
Qty: 90 TABLET | Refills: 0 | Status: SHIPPED | OUTPATIENT
Start: 2025-01-22

## 2025-01-23 ENCOUNTER — OFFICE VISIT (OUTPATIENT)
Dept: FAMILY MEDICINE CLINIC | Facility: CLINIC | Age: OVER 89
End: 2025-01-23
Payer: MEDICARE

## 2025-01-23 VITALS
DIASTOLIC BLOOD PRESSURE: 86 MMHG | RESPIRATION RATE: 14 BRPM | BODY MASS INDEX: 23.39 KG/M2 | HEART RATE: 82 BPM | HEIGHT: 63 IN | SYSTOLIC BLOOD PRESSURE: 138 MMHG | WEIGHT: 132 LBS

## 2025-01-23 DIAGNOSIS — I70.0 AORTIC ATHEROSCLEROSIS: ICD-10-CM

## 2025-01-23 DIAGNOSIS — M46.96 UNSPECIFIED INFLAMMATORY SPONDYLOPATHY, LUMBAR REGION: ICD-10-CM

## 2025-01-23 DIAGNOSIS — R55 SYNCOPE AND COLLAPSE: Primary | ICD-10-CM

## 2025-01-23 DIAGNOSIS — N39.41 URGE INCONTINENCE OF URINE: ICD-10-CM

## 2025-01-23 DIAGNOSIS — N18.4 CHRONIC KIDNEY DISEASE, STAGE 4 (SEVERE) (HCC): ICD-10-CM

## 2025-01-23 DIAGNOSIS — H43.12 VITREOUS HEMORRHAGE, LEFT EYE (HCC): ICD-10-CM

## 2025-01-23 DIAGNOSIS — E78.2 MIXED HYPERLIPIDEMIA: ICD-10-CM

## 2025-01-23 DIAGNOSIS — I10 PRIMARY HYPERTENSION: ICD-10-CM

## 2025-01-23 DIAGNOSIS — E03.8 SUBCLINICAL HYPOTHYROIDISM: ICD-10-CM

## 2025-01-23 DIAGNOSIS — R73.03 PREDIABETES: ICD-10-CM

## 2025-01-23 DIAGNOSIS — M48.062 SPINAL STENOSIS OF LUMBAR REGION WITH NEUROGENIC CLAUDICATION: ICD-10-CM

## 2025-01-23 PROCEDURE — 99496 TRANSJ CARE MGMT HIGH F2F 7D: CPT | Performed by: FAMILY MEDICINE

## 2025-01-23 NOTE — ASSESSMENT & PLAN NOTE
Seen on multiple Xr spine,  Mild sacroiliac arthritis, worse on the right- In past, T#3 or Norco 5 prn. Now voltaren Gel, stable, continue present management and continue to monitor for progression

## 2025-01-23 NOTE — ASSESSMENT & PLAN NOTE
Cholesterol shows Good control. Long term heart-healthy diet and lifestyle discussed and encouraged to reduce risk of cardiovascular disease.  2/8/2023: Cholesterol, Total 155; HDL Cholesterol 39 (L); Triglycerides 144; LDL Cholesterol 91  Cholesterol Meds: Lovastatin Tabs - 20 MG  stable  Continue with current treatment plan

## 2025-01-23 NOTE — ASSESSMENT & PLAN NOTE
Thyroid shows Good control.   3/21/2024: TSH 3.290 well controlled current treatment plan effective, no change in therapy

## 2025-01-23 NOTE — ASSESSMENT & PLAN NOTE
3/21/2024: HgbA1C 6.2 (H)  1/18/2025: Glucose 107 (H) Sugar control is stable  Continue with current treatment plan  Pre-Diabetic. Not currently on Diabetic meds.

## 2025-01-23 NOTE — ASSESSMENT & PLAN NOTE
BP shows poor control with last BP of 159/62. Lifestyle changes, diet, exercise and weight loss were counseled. Medication changes as listed.   1/18/2025: Potassium 4.0; Creatinine 1.16 (H); eGFR-Cr 44 (L)  BP Meds: losartan Tabs - 100 MG   ACE/ARB Adherence Good at 96%

## 2025-01-23 NOTE — ASSESSMENT & PLAN NOTE
Last Glomerular Filtration Rate: CKD 3b (GFR 44). .   1/18/2025: Creatinine 1.16 (H) CKD etiologies : Hypertension  Plan/Management: Control Hypertension/Diabetes

## 2025-01-23 NOTE — PROGRESS NOTES
Subjective:   Clary Quinones is a 89 year old female who presents for hospital follow up.   She was discharged from [facility] to Home or Self Care  Admission Date: 1/16/25   Discharge Date: 1/18/25  Hospital Discharge Diagnossyncop and collapse, fell without memory, is: fell again this morning, no LOC    Interactive contact within 2 business days post discharge first initiated on Date: 1/20/2025    I accessed [employer] and/or [employer] Everywhere and personally reviewed the following for the recent hospitalization: provider notes, consults, summaries, labs and other test results and the pertinent findings are documented below.     HPI: fekl again this am without LOC, fell with LOC lat week, we oks increase to 100 mg losartan because of high home readings, but still on 50 this AMN>     History/Other:   Current Medications:  Medication Reconciliation:  I am aware of an inpatient discharge within the last 30 days.  The discharge medication list has been reconciled with the patient's current medication list and reviewed by me. See medication list for additions of new medication, and changes to current doses of medications and discontinued medications.  Outpatient Medications Marked as Taking for the 1/23/25 encounter (Office Visit) with Camilo Perez MD   Medication Sig    LOVASTATIN 20 MG Oral Tab TAKE 1 TABLET(20 MG) BY MOUTH EVERY EVENING    losartan 100 MG Oral Tab Take 1 tablet (100 mg total) by mouth daily.    MYRBETRIQ 50 MG Oral Tablet 24 Hr TAKE 1 TABLET(50 MG) BY MOUTH DAILY    prednisoLONE 1 % Ophthalmic Suspension Place 1 drop into the right eye in the morning and 1 drop before bedtime.    ROCKLATAN 0.02-0.005 % Ophthalmic Solution Place 1 drop into the right eye nightly.    atropine 1 % Ophthalmic Solution Place 1 drop into both eyes in the morning and 1 drop before bedtime.    Acetaminophen Extra Strength 500 MG Oral Tab Take 2 tablets (1,000 mg total) by mouth every 6 (six) hours.    dorzolamide-timolol  2-0.5 % Ophthalmic Solution Place 1 drop into both eyes 2 (two) times daily.    PEG 3350 Oral Powd Pack Take 17 g by mouth daily.    Probiotic Product (PROBIOTIC-10 OR) Take by mouth.    Niacin 500 MG Oral Tab Take 1 tablet (500 mg total) by mouth daily with breakfast.    Omega-3 Fatty Acids (FISH OIL) 1000 MG Oral Cap Take 1,000 mg by mouth daily.    Multiple Vitamins-Minerals (MULTIVITAMIN & MINERAL OR) Take 1 tablet by mouth daily.       Review of Systems:  GENERAL: weight stable, energy stable, no sweating  SKIN: denies any unusual skin lesions  EYES: denies blurred vision or double vision  HEENT: denies nasal congestion, sinus pain or ST  LUNGS: denies shortness of breath with exertion  CARDIOVASCULAR: denies chest pain on exertion or palpitations  GI: denies abdominal pain, denies heartburn, denies diarrhea  MUSCULOSKELETAL: denies pain, normal range of motion of extremities  NEURO: denies headaches, denies dizziness, denies weakness  PSYCHE: denies depression or anxiety  HEMATOLOGIC: denies hx of anemia, or bruising, denies bleeding  ENDOCRINE: denies thyroid history  ALL/ASTHMA: denies hx of allergy or asthma    Objective:   No LMP recorded. Patient has had a hysterectomy.  Estimated body mass index is 23.38 kg/m² as calculated from the following:    Height as of this encounter: 5' 3\" (1.6 m).    Weight as of this encounter: 132 lb (59.9 kg).   /86   Pulse 82   Resp 14   Ht 5' 3\" (1.6 m)   Wt 132 lb (59.9 kg)   BMI 23.38 kg/m²    GENERAL: well developed, well nourished, in no apparent distress  SKIN: no rashes, no suspicious lesions  HEENT: atraumatic, normocephalic, ears and throat are clear  EYES: PERRLA, EOMI, conjunctiva are clear  NECK: supple, no adenopathy, no bruits  CHEST: no chest tenderness  LUNGS: clear to auscultation  CARDIO: RRR without murmur  GI: good BS's, no masses, HSM or tenderness  MUSCULOSKELETAL: back is not tender, FROM of the extremities  EXTREMITIES: no cyanosis,  clubbing or edema  NEURO: Oriented times three, cranial nerves are intact, motor and sensory are grossly intact    Assessment & Plan:   1. Syncope and collapse (Primary)  2. Aortic atherosclerosis (HCC)  Overview:  Seen on 2011 L spine Xray, \"Arterial calcifications are present. No direct demonstration of an abdominal aortic aneurysm.\" lovastatin 20  Assessment & Plan:  Seen 2011 with mild calcifications, stable on statin.   3. Chronic kidney disease, stage 4 (severe) ([facility])  Overview:  GFR 46 normally but 29 in 2020, due to HTN, lisinopril 10  Assessment & Plan:  Last Glomerular Filtration Rate: CKD 3b (GFR 44). .   1/18/2025: Creatinine 1.16 (H) CKD etiologies : Hypertension  Plan/Management: Control Hypertension/Diabetes   4. Vitreous hemorrhage, left eye ([facility])  Assessment & Plan:  Diagnosis 6/2024, stable findings, managed by ophthalmology. Currently stable and controlled with the current treatment plan. Continue present management.      5. Primary hypertension  Overview:  lisinoril 20 until 6/2015, then off with /80. Switched to losartan 25 2/10/2023 because of elevation  Assessment & Plan:  BP shows poor control with last BP of 159/62. Lifestyle changes, diet, exercise and weight loss were counseled. Medication changes as listed.   1/18/2025: Potassium 4.0; Creatinine 1.16 (H); eGFR-Cr 44 (L)  BP Meds: losartan Tabs - 100 MG   ACE/ARB Adherence Good at 96%    6. Mixed hyperlipidemia  Overview:  Lovastatin 20, niacin 500  Assessment & Plan:  Cholesterol shows Good control. Long term heart-healthy diet and lifestyle discussed and encouraged to reduce risk of cardiovascular disease.  2/8/2023: Cholesterol, Total 155; HDL Cholesterol 39 (L); Triglycerides 144; LDL Cholesterol 91  Cholesterol Meds: Lovastatin Tabs - 20 MG  stable  Continue with current treatment plan   7. Unspecified inflammatory spondylopathy, lumbar region ([facility])  Overview:  Seen on multiple Xr spine,  Mild sacroiliac arthritis, worse on the  right- In past, T#3 or Norco 5 prn. Now voltaren Gel  Assessment & Plan:  Seen on multiple Xr spine,  Mild sacroiliac arthritis, worse on the right- In past, T#3 or Norco 5 prn. Now voltaren Gel, stable, continue present management and continue to monitor for progression   8. Spinal stenosis of lumbar region with neurogenic claudication  Overview:  Dr pan managing  Assessment & Plan:  Stbal lumbar disease. Continue to monitor and continue present management   9. Subclinical hypothyroidism  Overview:  TSH in 3's    Assessment & Plan:  Thyroid shows Good control.   3/21/2024: TSH 3.290 well controlled current treatment plan effective, no change in therapy   10. Prediabetes  Overview:  A1c 6.0% 10/2014  Assessment & Plan:  3/21/2024: HgbA1C 6.2 (H)  1/18/2025: Glucose 107 (H) Sugar control is stable  Continue with current treatment plan  Pre-Diabetic. Not currently on Diabetic meds.   11. Urge incontinence of urine  Overview:  Stopped Vesicare 5,  Oxybutynin XL 5 2016. Myrtbiq too expensive  Assessment & Plan:  Stable, continue present management and continue to monitor for progression     Hold myrbetiq for 1 month, continue losartan 50. Reassess next month for AWV      Return in about 5 weeks (around 2/27/2025) for AHA (Annual Health Assessment) visit (Supervisit), as previously scheduled.

## 2025-01-23 NOTE — ASSESSMENT & PLAN NOTE
Diagnosis 6/2024, stable findings, managed by ophthalmology. Currently stable and controlled with the current treatment plan. Continue present management.

## 2025-01-31 NOTE — TELEPHONE ENCOUNTER
Requested Prescriptions     Pending Prescriptions Disp Refills    LOVASTATIN 20 MG Oral Tab [Pharmacy Med Name: LOVASTATIN 20MG TABLETS] 90 tablet 0     Sig: TAKE 1 TABLET(20 MG) BY MOUTH EVERY EVENING     LOV 8/28/2024     Patient was asked to follow-up in: 6 months    Appointment scheduled: 1/23/2025 Camilo Perez MD     Medication refilled per protocol.     Well controlled  Continue ibuprofen prn  Orders:    ibuprofen (MOTRIN) 200 mg tablet; Take 2 tablets (400 mg total) by mouth every 6 (six) hours as needed for mild pain

## 2025-02-05 DIAGNOSIS — E78.5 HYPERLIPIDEMIA LDL GOAL <100: ICD-10-CM

## 2025-02-08 RX ORDER — LOVASTATIN 20 MG/1
20 TABLET ORAL EVERY EVENING
Qty: 90 TABLET | Refills: 3 | Status: SHIPPED | OUTPATIENT
Start: 2025-02-08

## 2025-02-08 NOTE — TELEPHONE ENCOUNTER
No labs ordered.    Requested Prescriptions     Pending Prescriptions Disp Refills    Lovastatin 20 MG Oral Tab 90 tablet 0     Sig: Take 1 tablet (20 mg total) by mouth every evening.        Last refill: 1/22/25 90 tabs 0 refills    Last Appointment: LOV 1/23/2025     Next Appointment: 2/27/2025 Camilo Perez MD

## 2025-02-26 NOTE — ASSESSMENT & PLAN NOTE
BP shows good control with last BP of 124/76. Continue lifestyle changes, diet, exercise and weight loss.   1/18/2025: Potassium 4.0; Creatinine 1.16 (H); eGFR-Cr 44 (L)  BP Meds: losartan Tabs - 50 MG   ACE/ARB Adherence Excellent at 99%      Orders:    Detailed, Mod Complex ([id number])

## 2025-02-26 NOTE — ASSESSMENT & PLAN NOTE
Seen 2011. Stable calsiciafaiton s, on statin. Continue to monitor and continue present management     Orders:    Detailed, Mod Complex ([id number])

## 2025-02-26 NOTE — ASSESSMENT & PLAN NOTE
3/21/2024: HgbA1C 6.2 (H)  1/18/2025: Glucose 107 (H) Sugar control is stable  Continue with current treatment plan  Pre-Diabetic. Not currently on Diabetic meds.     Orders:    Hemoglobin A1C; Future; Expected date: 05/28/2025    Detailed, Mod Complex ([id number])

## 2025-02-26 NOTE — ASSESSMENT & PLAN NOTE
On meds in past, oxybutin because of cost of myrbetiq. But will restart myrbetiq 50 mg now.

## 2025-02-26 NOTE — ASSESSMENT & PLAN NOTE
Cholesterol shows Good control. Long term heart-healthy diet and lifestyle discussed and encouraged to reduce risk of cardiovascular disease.  2/8/2023: Cholesterol, Total 155; HDL Cholesterol 39 (L); Triglycerides 144; LDL Cholesterol 91  Cholesterol Meds: Lovastatin Tabs - 20 MG  stable  Continue with current treatment plan     Orders:    Lipid Panel; Future; Expected date: 05/28/2025    Detailed, Mod Complex ([id number])

## 2025-02-26 NOTE — ASSESSMENT & PLAN NOTE
Seen 2024, appears stable per ophthalmology. Continue to monitor and continue present management

## 2025-02-26 NOTE — ASSESSMENT & PLAN NOTE
Seen mulrple spinal films. Mild arthritis. Wirose on right, some low amounts of pain meds. Stable, continue present management and continue to monitor for progression

## 2025-02-26 NOTE — ASSESSMENT & PLAN NOTE
Thyroid shows Good control.   3/21/2024: TSH 3.290 well controlled current treatment plan effective, no change in therapy     Orders:    TSH and Free T4; Future; Expected date: 05/28/2025    Detailed, Mod Complex ([id number])

## 2025-02-27 ENCOUNTER — OFFICE VISIT (OUTPATIENT)
Dept: FAMILY MEDICINE CLINIC | Facility: CLINIC | Age: OVER 89
End: 2025-02-27
Payer: MEDICARE

## 2025-02-27 VITALS
RESPIRATION RATE: 14 BRPM | HEIGHT: 63 IN | HEART RATE: 78 BPM | BODY MASS INDEX: 23 KG/M2 | DIASTOLIC BLOOD PRESSURE: 76 MMHG | SYSTOLIC BLOOD PRESSURE: 124 MMHG

## 2025-02-27 DIAGNOSIS — N18.4 CHRONIC KIDNEY DISEASE, STAGE 4 (SEVERE) (HCC): ICD-10-CM

## 2025-02-27 DIAGNOSIS — I10 ESSENTIAL HYPERTENSION: ICD-10-CM

## 2025-02-27 DIAGNOSIS — H43.12 VITREOUS HEMORRHAGE, LEFT EYE (HCC): ICD-10-CM

## 2025-02-27 DIAGNOSIS — M46.96 UNSPECIFIED INFLAMMATORY SPONDYLOPATHY, LUMBAR REGION: ICD-10-CM

## 2025-02-27 DIAGNOSIS — E03.8 SUBCLINICAL HYPOTHYROIDISM: ICD-10-CM

## 2025-02-27 DIAGNOSIS — R73.03 PREDIABETES: ICD-10-CM

## 2025-02-27 DIAGNOSIS — I10 PRIMARY HYPERTENSION: ICD-10-CM

## 2025-02-27 DIAGNOSIS — I70.0 AORTIC ATHEROSCLEROSIS: ICD-10-CM

## 2025-02-27 DIAGNOSIS — N39.41 URGE INCONTINENCE OF URINE: ICD-10-CM

## 2025-02-27 DIAGNOSIS — E78.2 MIXED HYPERLIPIDEMIA: ICD-10-CM

## 2025-02-27 DIAGNOSIS — M48.062 SPINAL STENOSIS OF LUMBAR REGION WITH NEUROGENIC CLAUDICATION: ICD-10-CM

## 2025-02-27 DIAGNOSIS — Z00.00 ANNUAL PHYSICAL EXAM: Primary | ICD-10-CM

## 2025-02-27 PROCEDURE — 99214 OFFICE O/P EST MOD 30 MIN: CPT | Performed by: FAMILY MEDICINE

## 2025-02-27 PROCEDURE — G2211 COMPLEX E/M VISIT ADD ON: HCPCS | Performed by: FAMILY MEDICINE

## 2025-02-27 PROCEDURE — G0439 PPPS, SUBSEQ VISIT: HCPCS | Performed by: FAMILY MEDICINE

## 2025-02-27 RX ORDER — LOSARTAN POTASSIUM 50 MG/1
50 TABLET ORAL DAILY
Qty: 90 TABLET | Refills: 3 | Status: SHIPPED | OUTPATIENT
Start: 2025-02-27

## 2025-02-27 RX ORDER — LOSARTAN POTASSIUM 100 MG/1
50 TABLET ORAL DAILY
Qty: 90 TABLET | Refills: 3 | Status: SHIPPED | OUTPATIENT
Start: 2025-02-27 | End: 2025-02-27

## 2025-02-27 NOTE — PROGRESS NOTES
Subjective:   Clary Quinones is a 89 year old female who presents for a Subsequent Annual Wellness visit (Pt already had Initial Annual Wellness) and scheduled follow up of multiple significant but stable problems.   History of Present Illness  Clary Quinones is a 89 year old female with prediabetes and chronic kidney disease who presents for follow-up after a recent hospital stay.    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 She recently visited a specialist for her vision problems.    She completed a month-long monitoring with a topical Holter monitor to evaluate her heart rhythms following a syncope episode. No further episodes have occurred since her hospital discharge.    Her blood pressure is well-controlled at 124/76 mmHg. She is on losartan, currently taking 50 mg daily.    She has a history of prediabetes, with fasting blood sugars ranging from 107 to 111 mg/dL during her recent hospital stay. Her last A1c was 6.2% in March of the previous year.    She has chronic kidney disease, with kidney function improving from a GFR of 29 in 2020 to 46 at the time of her hospital discharge.    She has stopped bladder medications to assess their impact on dizziness, reporting no further dizziness since discontinuation. However, she has noticed increased urinary frequency and believes the medications were beneficial.      Last A1c value was 6.2% done 3/21/2024.   History/Other:   Fall Risk Assessment:   She has been screened for Falls and is High Risk. Fall Prevention information provided to patient in After Visit Summary.    Do you feel unsteady when standing or walking?: Yes  Do you worry about falling?: Yes  Have you fallen in the past year?: Yes  How many times have you fallen?: 3  Were you injured?: Yes     Cognitive Assessment:   She had a completely normal cognitive assessment - see  flowsheet entries     Functional Ability/Status:   Clary Quinones has some abnormal functions as listed below:  She has Driving difficulties based on screening of functional status. She has Meal Preparation difficulties based on screening of functional status.She has difficulties Managing Money/Bills based on screening of functional status.She has difficulties Shopping for Groceries based on screening of functional status. She has Hearing problems based on screening of functional status.She has Vision problems based on screening of functional status. She has problems with Daily Activities based on screening of functional status.       Depression Screening (PHQ):  PHQ-2 SCORE: 0  , done 2/27/2025   Trouble falling or staying asleep, or sleeping too much: 2     If you checked off any problems, how difficult have these problems made it for you to do your work, take care of things at home, or get along with other people?: Not difficult at all           Advanced Directives:   She has a Living Will on file in [employer]; reviewed and discussed documents with patient (and family/surrogate if present).  She has a Power of  for Health Care on file in [employer].  Discussed Advance Care Planning with patient (and family/surrogate if present). Standard forms made available to patient in After Visit Summary.      Patient Active Problem List   Diagnosis    Urinary incontinence    Primary hypertension    Mixed hyperlipidemia    Prediabetes    Unspecified inflammatory spondylopathy, lumbar region    Chronic kidney disease, stage 4 (severe) ([facility])    Aortic atherosclerosis    Spinal stenosis of lumbar region with neurogenic claudication    Subclinical hypothyroidism    Vitreous hemorrhage, left eye ([facility])    Syncope and collapse     Allergies:  She is allergic to latex, penicillins, polysporin [bacitracin-polymyxin b], and adhesive tape.    Current Medications:  Outpatient Medications Marked as Taking for the 2/27/25 encounter  (Office Visit) with Camilo Perez MD   Medication Sig    losartan 50 MG Oral Tab Take 1 tablet (50 mg total) by mouth daily.    Lovastatin 20 MG Oral Tab Take 1 tablet (20 mg total) by mouth every evening.    prednisoLONE 1 % Ophthalmic Suspension Place 1 drop into the right eye in the morning and 1 drop before bedtime.    ROCKLATAN 0.02-0.005 % Ophthalmic Solution Place 1 drop into the right eye nightly.    atropine 1 % Ophthalmic Solution Place 1 drop into both eyes in the morning and 1 drop before bedtime.    Acetaminophen Extra Strength 500 MG Oral Tab Take 2 tablets (1,000 mg total) by mouth every 6 (six) hours.    dorzolamide-timolol 2-0.5 % Ophthalmic Solution Place 1 drop into both eyes 2 (two) times daily.    PEG 3350 Oral Powd Pack Take 17 g by mouth daily.    Probiotic Product (PROBIOTIC-10 OR) Take by mouth.    Niacin 500 MG Oral Tab Take 1 tablet (500 mg total) by mouth daily with breakfast.    Omega-3 Fatty Acids (FISH OIL) 1000 MG Oral Cap Take 1,000 mg by mouth daily.    Multiple Vitamins-Minerals (MULTIVITAMIN & MINERAL OR) Take 1 tablet by mouth daily.       Medical History:  She  has a past medical history of Back pain, Back problem, Glaucoma, High blood pressure, and actinic keratosis (03/29/2017).  Surgical History:  She  has a past surgical history that includes appendectomy (1950); cataract surgery, complex (2005); colonoscopy (05/1998 03/2004); hysterectomy (1976); appendectomy; diagnostic anoscopy; cataract (2005); and colonoscopy (4/2/2014).   Family History:  Her family history includes Cancer in her brother and father; Heart Attack in her brother; Stroke in her mother and sister.  Social History:  She  reports that she has never smoked. She has never used smokeless tobacco. She reports that she does not currently use alcohol. She reports that she does not use drugs.    Tobacco:  She has never smoked tobacco.    CAGE Alcohol Screen:   CAGE screening score of 0 on 2/27/2025, showing low  risk of alcohol abuse.      Patient Care Team:  Camilo Perez MD as PCP - General (Family Practice)  Brayan Berry (OPHTHALMOLOGY)  Rafael Bernal (DERMATOLOGY)  Morgan Taylor MD as Consulting Physician (NEPHROLOGY)  Baron Carlos, MICHEL as Physical Therapist    Review of Systems   Constitutional: Negative.  Negative for activity change, appetite change, chills and fever.   HENT: Negative.     Eyes: Negative.    Respiratory: Negative.  Negative for shortness of breath.    Cardiovascular: Negative.  Negative for chest pain and palpitations.   Gastrointestinal: Negative.  Negative for abdominal pain.   Genitourinary: Negative.  Negative for dysuria.   Musculoskeletal:  Negative for arthralgias.   Skin: Negative.  Negative for rash.   Allergic/Immunologic: Negative.    Neurological: Negative.          Objective:   Physical Exam  Vitals and nursing note reviewed.   Constitutional:       General: She is not in acute distress.     Appearance: Normal appearance.   HENT:      Head: Normocephalic and atraumatic.      Right Ear: Tympanic membrane and external ear normal.      Left Ear: Tympanic membrane and external ear normal.      Nose: Nose normal.      Mouth/Throat:      Mouth: Mucous membranes are moist.   Eyes:      Extraocular Movements: Extraocular movements intact.      Pupils: Pupils are equal, round, and reactive to light.   Cardiovascular:      Rate and Rhythm: Normal rate and regular rhythm.      Pulses: Normal pulses.           Carotid pulses are 2+ on the right side and 2+ on the left side.       Radial pulses are 2+ on the right side and 2+ on the left side.        Dorsalis pedis pulses are 2+ on the right side and 2+ on the left side.        Posterior tibial pulses are 2+ on the right side and 2+ on the left side.      Heart sounds: Normal heart sounds, S1 normal and S2 normal. No murmur heard.  Pulmonary:      Effort: Pulmonary effort is normal.      Breath sounds: Normal breath sounds.   Abdominal:       General: Abdomen is flat. Bowel sounds are normal. There is no distension.      Palpations: Abdomen is soft.   Musculoskeletal:         General: Normal range of motion.      Cervical back: Normal range of motion and neck supple.      Right lower leg: No edema.      Left lower leg: No edema.   Skin:     General: Skin is warm and dry.      Capillary Refill: Capillary refill takes less than 2 seconds.   Neurological:      General: No focal deficit present.      Mental Status: She is alert and oriented to person, place, and time.   Psychiatric:         Mood and Affect: Mood normal.         Behavior: Behavior normal.         Thought Content: Thought content normal.           /76   Pulse 78   Resp 14   Ht 5' 3\" (1.6 m)   BMI 23.38 kg/m²  Estimated body mass index is 23.38 kg/m² as calculated from the following:    Height as of this encounter: 5' 3\" (1.6 m).    Weight as of 1/23/25: 132 lb (59.9 kg).    Medicare Hearing Assessment:   Hearing Screening    Screening Method: Whisper Test  Whisper Test Result:  (Comment: hearing aids)         Visual Acuity:   Right Eye Visual Acuity: Corrected Right Eye Chart Acuity: 20/30   Left Eye Visual Acuity: Corrected (left eye is blind)     Both Eyes Visual Acuity: Corrected Both Eyes Chart Acuity: 20/30   Able To Tolerate Visual Acuity: Yes        Assessment & Plan:   Clary Quinones is a 89 year old female who presents for a Medicare Assessment.     Assessment & Plan  Annual physical exam         Aortic atherosclerosis  Seen 2011. Stable calsiciafaiton s, on statin. Continue to monitor and continue present management     Orders:    Detailed, Mod Complex (70621)    Chronic kidney disease, stage 4 (severe) ([facility])    Orders:    CBC With Differential With Platelet; Future; Expected date: 05/28/2025    Comp Metabolic Panel (14); Future; Expected date: 05/28/2025    Detailed, Mod Complex (88602)    Primary hypertension  BP shows good control with last BP of 124/76. Continue  lifestyle changes, diet, exercise and weight loss.   1/18/2025: Potassium 4.0; Creatinine 1.16 (H); eGFR-Cr 44 (L)  BP Meds: losartan Tabs - 50 MG   ACE/ARB Adherence Excellent at 99%      Orders:    Detailed, Mod Complex (79742)    Mixed hyperlipidemia  Cholesterol shows Good control. Long term heart-healthy diet and lifestyle discussed and encouraged to reduce risk of cardiovascular disease.  2/8/2023: Cholesterol, Total 155; HDL Cholesterol 39 (L); Triglycerides 144; LDL Cholesterol 91  Cholesterol Meds: Lovastatin Tabs - 20 MG  stable  Continue with current treatment plan     Orders:    Lipid Panel; Future; Expected date: 05/28/2025    Detailed, Mod Complex (61383)    Subclinical hypothyroidism  Thyroid shows Good control.   3/21/2024: TSH 3.290 well controlled current treatment plan effective, no change in therapy     Orders:    TSH and Free T4; Future; Expected date: 05/28/2025    Geovani Mod Complex ([id number])    Prediabetes  3/21/2024: HgbA1C 6.2 (H)  1/18/2025: Glucose 107 (H) Sugar control is stable  Continue with current treatment plan  Pre-Diabetic. Not currently on Diabetic meds.     Orders:    Hemoglobin A1C; Future; Expected date: 05/28/2025    Geovani Mod Complex (38390)    Spinal stenosis of lumbar region with neurogenic claudication  Managing with pain clinic         Unspecified inflammatory spondylopathy, lumbar region  Seen mulrple spinal films. Mild arthritis. Wirose on right, some low amounts of pain meds. Stable, continue present management and continue to monitor for progression          Urge incontinence of urine  On meds in past, oxybutin because of cost of myrbetiq. But will restart myrbetiq 50 mg now.          Vitreous hemorrhage, left eye (HCC)  Seen 2024, appears stable per ophthalmology. Continue to monitor and continue present management          Essential hypertension    Orders:    Losartan Potassium; Take 1 tablet (50 mg total) by mouth daily.  Dispense: 90 tablet; Refill: 3        Assessment & Plan  Vision Loss  Complete loss of vision in the left eye due to vitreous hemorrhage. Right eye has 20/20 vision but with limited field of vision.  -No changes to current management plan.    Syncope  Recent fainting episode. Patient wore a Holter monitor for a month to monitor for any abnormal heart rhythms. Results pending.  -Await results of Holter monitor.    Hypertension  Blood pressure well controlled at 124/76 on Losartan 50mg daily.  -Continue Losartan 50mg daily.    Prediabetes  Last A1C was 6.2% in March of the previous year. Recent fasting blood sugars slightly elevated in the hospital.  -Plan to check A1C and fasting blood sugars in May or June.    Hyperlipidemia  On Lovastatin with refills for the year.  -Continue Lovastatin as prescribed.    Urinary Frequency  Increased urinary frequency since stopping Myrbetriq. No urgency or incontinence.  -Resume Myrbetriq with caution. If any lightheadedness or fainting occurs, discontinue permanently.    General Health Maintenance / Followup Plans  -Check kidney function, thyroid function, and cholesterol levels in May or June.  -Continue monitoring vitreous hemorrhage in left eye.  -Follow up with Dr. Tovar for possible cortisone shot or ablation for back pain.  The patient indicates understanding of these issues and agrees to the plan.  Reinforced healthy diet, lifestyle, and exercise.      Return in 6 months (on 8/27/2025).     Camilo Perez MD, 2/27/2025     Supplementary Documentation:   General Health:  In the past six months, have you lost more than 10 pounds without trying?: 1 - Yes  Has your appetite been poor?: No  Type of Diet: Balanced  How does the patient maintain a good energy level?: Other  How would you describe your daily physical activity?: Light  How would you describe your current health state?: Fair  How do you maintain positive mental well-being?: Games  On a scale of 0 to 10, with 0 being no pain and 10 being severe pain, what  is your pain level?: 0 - (None)  In the past six months, have you experienced urine leakage?: 1-Yes  At any time do you feel concerned for the safety/well-being of yourself and/or your children, in your home or elsewhere?: No  Have you had any immunizations at another office such as Influenza, Hepatitis B, Tetanus, or Pneumococcal?: No    Health Maintenance   Topic Date Due    COVID-19 Vaccine (4 - 2024-25 season) 09/01/2024    Annual Physical  02/26/2025    Influenza Vaccine  Completed    Annual Depression Screening  Completed    Fall Risk Screening (Annual)  Completed    Pneumococcal Vaccine: 50+ Years  Completed    Zoster Vaccines  Completed    Meningococcal B Vaccine  Aged Out

## 2025-02-27 NOTE — ASSESSMENT & PLAN NOTE
Orders:    CBC With Differential With Platelet; Future; Expected date: 05/28/2025    Comp Metabolic Panel (14); Future; Expected date: 05/28/2025    Detailed, Mod Complex (33876)

## 2025-02-27 NOTE — PATIENT INSTRUCTIONS
Clary Quinones's SCREENING SCHEDULE   Tests on this list are recommended by your physician but may not be covered, or covered at this frequency, by your insurer.   Please check with your insurance carrier before scheduling to verify coverage.   PREVENTATIVE SERVICES FREQUENCY &  COVERAGE DETAILS LAST COMPLETION DATE   Diabetes Screening    Fasting Blood Sugar /  Glucose    One screening every 12 months if never tested or if previously tested but not diagnosed with pre-diabetes   One screening every 6 months if diagnosed with pre-diabetes Lab Results   Component Value Date     (H) 01/18/2025        Cardiovascular Disease Screening    Lipid Panel  Cholesterol  Lipoprotein (HDL)  Triglycerides Covered every 5 years for all Medicare beneficiaries without apparent signs or symptoms of cardiovascular disease Lab Results   Component Value Date    CHOLEST 155 02/08/2023    HDL 39 (L) 02/08/2023    LDL 91 02/08/2023    TRIG 144 02/08/2023         Electrocardiogram (EKG)   Covered if needed at Welcome to Medicare, and non-screening if indicated for medical reasons 01/16/2025      Ultrasound Screening for Abdominal Aortic Aneurysm (AAA) Covered once in a lifetime for one of the following risk factors   • Men who are 65-75 years old and have ever smoked   • Anyone with a family history -     Colorectal Cancer Screening  Covered for ages 50-85; only need ONE of the following:    Colonoscopy   Covered every 10 years    Covered every 2 years if patient is at high risk or previous colonoscopy was abnormal -    No recommendations at this time    Flexible Sigmoidoscopy   Covered every 4 years -    Fecal Occult Blood Test Covered annually -   Bone Density Screening    Bone density screening    Covered every 2 years after age 65 if diagnosed with risk of osteoporosis or estrogen deficiency.    Covered yearly for long-term glucocorticoid medication use (Steroids) Last Dexa Scan:    XR DEXA BONE DENSITOMETRY (CPT=77080)  05/18/2017      No recommendations at this time   Pap and Pelvic    Pap   Covered every 2 years for women at normal risk; Annually if at high risk -  No recommendations at this time    Chlamydia Annually if high risk -  No recommendations at this time   Screening Mammogram    Mammogram     Recommend annually for all female patients aged 40 and older    One baseline mammogram covered for patients aged 35-39 -    No recommendations at this time    Immunizations    Influenza Covered once per flu season  Please get every year 10/02/2024  No recommendations at this time    Pneumococcal Each vaccine ([id number] & [id number]) covered once after 65 Prevnar 13: 09/25/2015    [id number]: 09/10/2008     No recommendations at this time    Hepatitis B One screening covered for patients with certain risk factors   -  No recommendations at this time    Tetanus Toxoid Not covered by Medicare Part B unless medically necessary (cut with metal); may be covered with your pharmacy prescription benefits 06/09/2021    Tetanus, Diptheria and Pertusis TD and TDaP Not covered by Medicare Part B -  No recommendations at this time    Zoster Not covered by Medicare Part B; may be covered with your pharmacy  prescription benefits 02/26/2014  No recommendations at this time     Annual Monitoring of Persistent Medications (ACE/ARB, digoxin diuretics, anticonvulsants)    Potassium Annually Lab Results   Component Value Date    K 4.0 01/18/2025         Creatinine   Annually Lab Results   Component Value Date    CREATSERUM 1.16 (H) 01/18/2025         BUN Annually Lab Results   Component Value Date    BUN 19 01/18/2025       Drug Serum Conc Annually No results found for: \"DIGOXIN\", \"DIG\", \"VALP\"

## 2025-02-27 NOTE — PROGRESS NOTES
The following individual(s) verbally consented to be recorded using ambient AI listening technology and understand that they can each withdraw their consent to this listening technology at any point by asking the clinician to turn off or pause the recording:    Patient name: Clary Quinones  Additional names:  max christianson

## 2025-03-03 ENCOUNTER — TELEPHONE (OUTPATIENT)
Dept: FAMILY MEDICINE CLINIC | Facility: CLINIC | Age: OVER 89
End: 2025-03-03

## 2025-03-03 NOTE — TELEPHONE ENCOUNTER
Gely is coming up on the end of care for the patient in 2 weeks and her plan is for discharge from home health, goals met.

## 2025-03-21 NOTE — PAT NURSING NOTE
Spoke with daughter Abby regarding PAT call. My chart is not used. We reviewed instructions and she v/u.    Will get another phone call on Monday afternoon w/ TOA. Check in at  Reg desk at [facility]. Okay to eat, drink, and take medications prior to procedure. Have a lighter breakfast or lunch depending on time of procedure. Shower or sponge bath w/ antibacterial soap before coming in. Abby will be driving her for procedure. No questions asked at this time. She v/u.

## 2025-03-25 ENCOUNTER — HOSPITAL ENCOUNTER (OUTPATIENT)
Dept: INTERVENTIONAL RADIOLOGY/VASCULAR | Facility: HOSPITAL | Age: OVER 89
Discharge: HOME | End: 2025-03-25
Attending: INTERNAL MEDICINE | Admitting: INTERNAL MEDICINE
Payer: MEDICARE

## 2025-03-25 VITALS
RESPIRATION RATE: 23 BRPM | BODY MASS INDEX: 23.55 KG/M2 | DIASTOLIC BLOOD PRESSURE: 77 MMHG | WEIGHT: 128 LBS | HEART RATE: 58 BPM | TEMPERATURE: 98 F | OXYGEN SATURATION: 95 % | SYSTOLIC BLOOD PRESSURE: 197 MMHG | HEIGHT: 62 IN

## 2025-03-25 DIAGNOSIS — I45.9 HEART BLOCK: ICD-10-CM

## 2025-03-25 PROCEDURE — 33285 INSJ SUBQ CAR RHYTHM MNTR: CPT | Performed by: NURSE PRACTITIONER

## 2025-03-25 PROCEDURE — 0JH632Z INSERTION OF MONITORING DEVICE INTO CHEST SUBCUTANEOUS TISSUE AND FASCIA, PERCUTANEOUS APPROACH: ICD-10-PCS | Performed by: NURSE PRACTITIONER

## 2025-03-25 RX ORDER — CHLORHEXIDINE GLUCONATE 40 MG/ML
SOLUTION TOPICAL
Status: DISCONTINUED | OUTPATIENT
Start: 2025-03-25 | End: 2025-03-25

## 2025-03-25 RX ORDER — LIDOCAINE HYDROCHLORIDE AND EPINEPHRINE BITARTRATE 20; .01 MG/ML; MG/ML
INJECTION, SOLUTION SUBCUTANEOUS
Status: COMPLETED
Start: 2025-03-25 | End: 2025-03-25

## 2025-03-25 RX ORDER — SODIUM CHLORIDE 9 MG/ML
INJECTION, SOLUTION INTRAVENOUS
Status: DISCONTINUED | OUTPATIENT
Start: 2025-03-26 | End: 2025-03-25

## 2025-03-25 NOTE — DISCHARGE INSTRUCTIONS
Implantable Loop Recorder Discharge Instructions  Incision Care/Bathing  You will go home with a dressing over the incisions site (Mepilex dressing). Keep dressing and incision site completely dry for 5 days after procedure. You may sponge bath or use hand-held sprayer to shower, keeping site completely dry.   After day 5, you can remove top Mepilex dressing and shower, letting clean soapy water run over incision area/steri-strips, patting dry.   The white steri-strips placed directly over the incision will gradually fall off over the next few weeks and can be physically removed after 3 weeks. Do not take them off before this time.    Keep site clean and dry, do not apply any ointments, creams, lotion to the incision.   Do not cover incision with extra dressings or gauze unless otherwise instructed.   Do not submerge incision site in water, take whirlpool baths or swim for 30 days or until site is completely healed.   Look at your incision site daily and monitor for signs and symptoms of infection: redness, swelling, drainage, fever. If signs of infection seen, please contact your doctor or device clinic.   You may have slight bruising: may take Tylenol for discomfort (if no allergy).   When to notify your physician:   If you have chest pain, shortness of breath or persistent cough  If you experience any signs of fever (temperature >101), chills, infection at the site (redness, swelling, drainage, foul odor)  Contact Information:  [facility] Direct Line: [phone]. Monday-Friday. 8:30AM-4PM.  [facility] Main Office: [phone] Monday- Friday 8AM-5PM  [facility] Main Office: [phone] Monday-Friday 8AM-5PM

## 2025-03-25 NOTE — H&P
History & Physical Examination    Patient Name: Clary Quinones  MRN: [id number]  CSN: [id number]  YOB: 1930    Diagnosis: syncope and AV block    Present Illness: Multiple episodes of falls vs syncope. MCT monitor showed rare AV block.   Presents today for loop recorder implant for longer term rhythm monitoring.     Prescriptions Prior to Admission[1]  No current facility-administered medications for this encounter.       Allergies: Allergies[2]    Past Medical History:    Back pain    3 months ago    Back problem    Glaucoma    High blood pressure    High cholesterol    Hx of actinic keratosis    Per Dr Jose G Gupta - Actinic keratosis destroyed     Renal disorder     Past Surgical History:   Procedure Laterality Date    Appendectomy  1950    Appendectomy      Cataract  2005    Cataract surgery, complex  2005    Colonoscopy  05/1998 03/2004    Colonoscopy  4/2/2014    Procedure: COLONOSCOPY;  Surgeon: Harshad Duron MD;  Location:  ENDOSCOPY    Diagnostic anoscopy      Hysterectomy  1976     Family History   Problem Relation Age of Onset    Cancer Father         Lung Cancer    Stroke Mother     Heart Attack Brother     Cancer Brother     Stroke Sister      Social History     Tobacco Use    Smoking status: Never    Smokeless tobacco: Never   Substance Use Topics    Alcohol use: Not Currently     Alcohol/week: 0.0 standard drinks of alcohol     Comment: 2 a year        SYSTEM Check if Review is Normal Check if Physical Exam is Normal If not normal, please explain:   HEENT [x ] [ ]x    NECK & BACK [ ] [ ]    HEART [ x] [x ]    LUNGS [ x] [ x]    ABDOMEN [ ] [ ]    UROGENITAL [ ] [ ] N/a   EXTREMITIES [ ] [ ]    OTHER        [ x ] I have discussed the risks and benefits and alternatives with the patient/family.  They understand and agree to proceed with plan of care.      Bernadette Diaz, APRN  3/25/2025  5:18 PM           [1]   No medications prior to admission.   [2]    Allergies  Allergen Reactions    Latex RASH    Penicillins RASH    Polysporin [Bacitracin-Polymyxin B] RASH    Adhesive Tape RASH

## 2025-03-25 NOTE — PROGRESS NOTES
Pt s/p loop implant. Pt tolerated procedure well. Dressing to L upper chest CDI. Denied pain. [employer] Rep provided education to patient and daughter. Bedside monitor given to pt. Discharge instructions reviewed-pt verbalized understanding. Pt to lobby vis WC and daughter driving home.

## 2025-03-25 NOTE — PROCEDURES
[facility]  Implantable Loop Recorder Implant Procedure Note    Clary Quinones Patient Status:  Outpatient in a Bed    1930 MRN [id number]   Location [facility] INTERVENTIONAL SUITES Attending No att. providers found   Hosp Day # 0 PCP Camilo Perez MD     OPERATION(S) PERFORMED:   1. 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  SEDATION:None       METHODS: The patient was brought to the EP lab in a nonsedated state after providing informed consent. The area if the ILR was cleaned, prepped and draped in sterile fashion.  After administering 1% lidocaine for local anesthesia, an incision was made with the provided tool. The ILR was inserted under the skin in the chest, just left of the sternal border.    Hemostasis was achieved with manual pressure.    Dermabond and Steristrips were placed with a occlusive dressing.     CONCLUSIONS:   1. Status post successful implantation of a [employer] ILR.   R waves measured 0.23ms.     Bernadette MCLEOD  [facility]

## 2025-05-08 ENCOUNTER — OFFICE VISIT (OUTPATIENT)
Dept: FAMILY MEDICINE CLINIC | Facility: CLINIC | Age: OVER 89
End: 2025-05-08
Payer: MEDICARE

## 2025-05-08 VITALS
BODY MASS INDEX: 24 KG/M2 | HEART RATE: 97 BPM | WEIGHT: 132 LBS | DIASTOLIC BLOOD PRESSURE: 60 MMHG | RESPIRATION RATE: 16 BRPM | OXYGEN SATURATION: 55 % | SYSTOLIC BLOOD PRESSURE: 142 MMHG

## 2025-05-08 DIAGNOSIS — N63.13 BREAST LUMP ON RIGHT SIDE AT 8 O'CLOCK POSITION: Primary | ICD-10-CM

## 2025-05-08 PROCEDURE — 99213 OFFICE O/P EST LOW 20 MIN: CPT | Performed by: PHYSICIAN ASSISTANT

## 2025-05-08 NOTE — PROGRESS NOTES
Subjective:   Clary Quinones is a 89 year old female who presents for Breast Lump (Right breast lump)     History/Other:   History of Present Illness  Clary Quinones is a 89 year old female who presents with a newly discovered breast lump. She is accompanied by her daughter.    She noticed a breast lump approximately one week ago while in the shower or putting on her bra. The lump is not painful, and she was surprised to find it. No fevers, chills, or night sweats. There have been no significant changes in her appetite, although she has unintentionally lost a lot of weight over the past year despite maintaining her food intake.    She has a history of a fall some time ago, which resulted in hitting the side of her body, but she does not experience any pain when pressure is applied to the area of the lump. She has not had any mammograms in the past 15 years or more.    She has no restrictions with arm or body movements, although she has balance issues when standing. She has a heart monitor implant, but it is unrelated to her current concern.     Chief Complaint Reviewed and Verified  Nursing Notes Reviewed and   Verified  Tobacco Reviewed  Allergies Reviewed  Medications Reviewed    OB Status Reviewed         Tobacco:  She has never smoked tobacco.    Current Medications[1]      Objective:   /60   Pulse 97   Resp 16   Wt 132 lb (59.9 kg)   SpO2 (!) 55%   BMI 24.14 kg/m²  Estimated body mass index is 24.14 kg/m² as calculated from the following:    Height as of 3/21/25: 5' 2\" (1.575 m).    Weight as of this encounter: 132 lb (59.9 kg).  Results  DIAGNOSTIC  Breast lump measurement: 4 cm x 2 cm, firm, fixed (05/08/2025)     Physical Exam  GENERAL: Alert, cooperative, well developed, no acute distress.  BREAST: Breast lump palpable in the right breast 8 oclock position, measuring 4 cm x 2 cm, firm, non-mobile, located in the lower quadrant.      Assessment & Plan:   1. Breast lump on right  side at 8 o'clock position (Primary)  -     ORI PRIYA 2D+3D DIAGNOSTIC ORI RIGHT (CPT=77065/77615); Future; Expected date: 05/08/2025  -     US BREAST RIGHT COMPLETE (CPT=76641); Future; Expected date: 05/08/2025    Assessment & Plan  Breast lump  A breast lump was identified approximately one week ago. The lump is firm, non-mobile, and measures approximately 4 cm by 2 cm,.  Explained the process of mammogram and ultrasound, with potential biopsy if indicated. Discussed post-diagnosis options, including surgical excision versus observation, emphasizing decision-making at each step based on findings and her preference. Highlighted that further testing and treatment decisions are based on her health status and personal preference.  - Order mammogram and breast ultrasound  - Coordinate with breast clinic for further management based on imaging results    Recording duration: 14 minutes        ANH Rebolledo, 5/8/2025, 12:24 PM         [1]   Current Outpatient Medications   Medication Sig Dispense Refill    losartan 50 MG Oral Tab Take 1 tablet (50 mg total) by mouth daily. 90 tablet 3    Lovastatin 20 MG Oral Tab Take 1 tablet (20 mg total) by mouth every evening. 90 tablet 3    prednisoLONE 1 % Ophthalmic Suspension Place 1 drop into the right eye in the morning and 1 drop before bedtime.      ROCKLATAN 0.02-0.005 % Ophthalmic Solution Place 1 drop into the right eye nightly.      atropine 1 % Ophthalmic Solution Place 1 drop into both eyes in the morning and 1 drop before bedtime.      Acetaminophen Extra Strength 500 MG Oral Tab Take 2 tablets (1,000 mg total) by mouth every 6 (six) hours.      dorzolamide-timolol 2-0.5 % Ophthalmic Solution Place 1 drop into both eyes 2 (two) times daily.      PEG 3350 Oral Powd Pack Take 17 g by mouth daily.      Probiotic Product (PROBIOTIC-10 OR) Take by mouth.      Niacin 500 MG Oral Tab Take 1 tablet (500 mg total) by mouth daily with breakfast.      Omega-3 Fatty Acids (FISH  OIL) 1000 MG Oral Cap Take 1,000 mg by mouth daily.      Multiple Vitamins-Minerals (MULTIVITAMIN & MINERAL OR) Take 1 tablet by mouth daily.

## 2025-05-08 NOTE — PROGRESS NOTES
The following individual(s) verbally consented to be recorded using ambient AI listening technology and understand that they can each withdraw their consent to this listening technology at any point by asking the clinician to turn off or pause the recording:    Patient name: Clary Quinones  Additional names:  daughter max christianson

## 2025-05-30 ENCOUNTER — HOSPITAL ENCOUNTER (OUTPATIENT)
Dept: MAMMOGRAPHY | Facility: HOSPITAL | Age: OVER 89
Discharge: HOME | End: 2025-05-30
Attending: PHYSICIAN ASSISTANT
Payer: MEDICARE

## 2025-05-30 DIAGNOSIS — N63.13 BREAST LUMP ON RIGHT SIDE AT 8 O'CLOCK POSITION: ICD-10-CM

## 2025-05-30 PROCEDURE — 77062 BREAST TOMOSYNTHESIS BI: CPT | Performed by: PHYSICIAN ASSISTANT

## 2025-05-30 PROCEDURE — 76642 ULTRASOUND BREAST LIMITED: CPT | Performed by: PHYSICIAN ASSISTANT

## 2025-05-30 PROCEDURE — 77066 DX MAMMO INCL CAD BI: CPT | Performed by: PHYSICIAN ASSISTANT

## 2025-05-30 NOTE — IMAGING NOTE
Clary Quinones is recommended for an ultrasound guided biopsy of the right breast by Dr.Nimesh Colvin.     History Breast lump on right side at 8 o'clock position   Findings- 1. Ultrasound-guided biopsy of a dominant right breast mass at 9 o'clock, 7 cm from nipple is recommended.  Subsequent surgical consultation is advised.   Clary Quinones's daughter requested contact information for Dr. Bernadette Nava. Dr. Nava's office phone number provided.   2. Additional suspicious appearing masses in the bilateral breasts as above.  These may be sampled with ultrasound guidance if indicated.   Recommendation- ULTRASOUND-GUIDED BIOPSY: RIGHT BREAST     See EMR for complete imaging report    Medications and allergies reviewed:  Current Medications[1]  The following allergies were reported-  LatexRASH  PenicillinsRASH  Polysporin [Bacitracin-polymyxin B]RASH  Adhesive TapeRASH    Clary Quinones denies the use of prescribed anticoagulants, denies known bleeding disorders and/or liver disease, denies chemotherapy    Ultrasound guided breast biopsy procedure explained to Clary Quinones and her daughter Abby. All questions answered.   Clary Quinones and her daughter provided with written educational material.   Encouraged to contact the breast nurse coordinator with questions or concerns.     Clary Quinones instructed to take 1000 mg of acetaminophen on the day of the biopsy, eat a light meal, and bring or wear a sport bra.  Post biopsy care and instruction reviewed: including no lifting more than five pounds, no upper body exercise, icing of biopsy site, no submerging in water.  Clary Quinones verbalized understanding.    Clary Quinones provided with an appointment at the [facility] center-Wednesday, June 04 at 1300. Sarwat Quinones instructed to arrive at 1230.  PLEASE NOTE: Due to mobility limitations, please perform biopsy procedure in ultrasound room.          [1]   Current Outpatient  Medications   Medication Sig Dispense Refill    losartan 50 MG Oral Tab Take 1 tablet (50 mg total) by mouth daily. 90 tablet 3    Lovastatin 20 MG Oral Tab Take 1 tablet (20 mg total) by mouth every evening. 90 tablet 3    prednisoLONE 1 % Ophthalmic Suspension Place 1 drop into the right eye in the morning and 1 drop before bedtime.      ROCKLATAN 0.02-0.005 % Ophthalmic Solution Place 1 drop into the right eye nightly.      atropine 1 % Ophthalmic Solution Place 1 drop into both eyes in the morning and 1 drop before bedtime.      Acetaminophen Extra Strength 500 MG Oral Tab Take 2 tablets (1,000 mg total) by mouth every 6 (six) hours.      dorzolamide-timolol 2-0.5 % Ophthalmic Solution Place 1 drop into both eyes 2 (two) times daily.      PEG 3350 Oral Powd Pack Take 17 g by mouth daily.      Probiotic Product (PROBIOTIC-10 OR) Take by mouth.      Niacin 500 MG Oral Tab Take 1 tablet (500 mg total) by mouth daily with breakfast.      Omega-3 Fatty Acids (FISH OIL) 1000 MG Oral Cap Take 1,000 mg by mouth daily.      Multiple Vitamins-Minerals (MULTIVITAMIN & MINERAL OR) Take 1 tablet by mouth daily.

## 2025-06-04 ENCOUNTER — HOSPITAL ENCOUNTER (OUTPATIENT)
Dept: MAMMOGRAPHY | Facility: HOSPITAL | Age: OVER 89
Discharge: HOME | End: 2025-06-04
Attending: PHYSICIAN ASSISTANT
Payer: MEDICARE

## 2025-06-04 ENCOUNTER — HOSPITAL ENCOUNTER (OUTPATIENT)
Dept: MAMMOGRAPHY | Facility: HOSPITAL | Age: OVER 89
End: 2025-06-04
Attending: PHYSICIAN ASSISTANT
Payer: MEDICARE

## 2025-06-04 ENCOUNTER — TELEPHONE (OUTPATIENT)
Dept: FAMILY MEDICINE CLINIC | Facility: CLINIC | Age: OVER 89
End: 2025-06-04

## 2025-06-04 DIAGNOSIS — R92.8 ABNORMAL FINDINGS ON DIAGNOSTIC IMAGING OF BREAST: ICD-10-CM

## 2025-06-04 DIAGNOSIS — N63.0 BREAST MASS: ICD-10-CM

## 2025-06-04 PROCEDURE — 88360 TUMOR IMMUNOHISTOCHEM/MANUAL: CPT | Performed by: PHYSICIAN ASSISTANT

## 2025-06-04 PROCEDURE — 19083 BX BREAST 1ST LESION US IMAG: CPT | Performed by: PHYSICIAN ASSISTANT

## 2025-06-04 PROCEDURE — 88305 TISSUE EXAM BY PATHOLOGIST: CPT | Performed by: PHYSICIAN ASSISTANT

## 2025-06-04 PROCEDURE — 77065 DX MAMMO INCL CAD UNI: CPT | Performed by: PHYSICIAN ASSISTANT

## 2025-06-04 PROCEDURE — 88342 IMHCHEM/IMCYTCHM 1ST ANTB: CPT | Performed by: PHYSICIAN ASSISTANT

## 2025-06-04 NOTE — TELEPHONE ENCOUNTER
Mar calling From [facility] called to talked to Young Melendrez nurse regarding critical lab results

## 2025-06-04 NOTE — TELEPHONE ENCOUNTER
Pt is having breast biopsy and something suspicious found. Mammography dept would like to know who Young would refer pt to for surgery if necessary. Spoke w/Young who states Dr. Kelly Farias. Message given to mammography who verbalized agreement and understanding.

## 2025-06-04 NOTE — DISCHARGE INSTRUCTIONS
Discharge Instructions  Stereotactic / Ultrasound / MRI Core Biopsy     Place an ice pack on top of the biopsy site in your bra OR the folds of the Ace wrap for 10-15 minutes every hour until bedtime for your comfort and to decrease bleeding.     Keep your supportive bra OR the Ace wrap in place for 24 hours after your biopsy. This compression decreases bleeding and breast movement for your comfort. Wear a supportive bra for the next couple days for comfort (as well as for sleeping)     No bath or shower during the first 24 hours after biopsy. After this time, you may take a bath or shower. It’s okay if the steri-strips get wet but don’t soak them.   No saunas, hot tubs, or swimming pools until the steri-strips fall off (5-7days).  This prevents infection and allows time for the area to completely close and heal.     DO NOT remove the steri-strips. They will fall off in 5 days to two weeks. If any type of irritation (redness, itching, OR blisters) develops in the area around the steri-strips, remove them gently. Keep the area clean and dry.     It is normal to have mild discomfort and bruising at the biopsy site.      You may take Tylenol as needed for discomfort.     DO NOT participate in strenuous activity (aerobics, heavy lifting, housework, gardening, etc.) 48 hours after your biopsy to prevent bleeding.     You will receive results typically within 2-3 business days. Occasionally, the specimen is sent off-site for a 2nd opinion. You will be notified when this occurs as this will delay your results.     If you have any questions about the procedure or your results, please contact the Breast Care Coordinator Nurse at [phone]. (M-F 7:30-4)    If you are having a MRI Breast Biopsy or an Ultrasound guided biopsy, you will be billed for the biopsy and a unilateral mammogram separately.    A 6-month or one year follow-up Diagnostic Mammogram/Ultrasound will be recommended to document stability of the biopsy  site. It may be scheduled at [facility] or [facility] by calling [phone]. You will need an order for this exam from your referring physician.       5/2024

## 2025-06-05 ENCOUNTER — TELEPHONE (OUTPATIENT)
Dept: GENERAL RADIOLOGY | Facility: HOSPITAL | Age: OVER 89
End: 2025-06-05

## 2025-06-05 NOTE — TELEPHONE ENCOUNTER
1706: Per Clary Quinones's request, this nurse contacted Ms. Quinones's daughter-Abby Saunders alternate contact in EMR- to discuss pathology results for right breast biopsy performed Wednesday, June 04   Results discussed as follows:  Final Diagnosis:   Right breast, 9:00 mass, ultrasound-guided biopsy:  -Invasive ductal carcinoma   -Ductal carcinoma in situ,   See EMR for complete pathology report    Per June 04 epic note-ANH Melendrez referring to Dr. Kelly Farias    Informed Abby Chip of the above surgical referral and informed Ms. Saunders that a nurse from the breast center would follow up to provide an appointment with Dr. Farias.    Abby Saunders shared that she has previously been under the care of Dr. Bernadette Nava. Ms. Saunders shared her preference to continue her mother's care with Dr. Nava.  Ms. Saunders shared that she has an appointment scheduled for consultations with Dr. Nava later this month.    Informed Abby Chip that a nurse from the breast center would follow up tomorrow-Friday June 06 to provide Dr. Nava's appointment availability and assist with scheduling an earlier appointment if available.     Abby Saunders verbalized understanding and appreciation for the call.

## 2025-06-05 NOTE — IMAGING NOTE
1648: Spoke with Clary Quinones post ultrasound guided right breast biopsy.  Introduced myself as one of the breast nurse coordinators at [facility] and informed Ms. Quinones of the purpose of my call.  Name and date of birth verified by patient.    Reinforced post biopsy care and instruction.  Yeny Stacie denies any issues with biopsy site- bleeding, drainage, redness, tenderness.     Ms. Quinones requested that pathology results and recommendations be provided to her daughter Abby Saunders.   is listed as an alternate contact in Clary Quinones's  EMR.    Informed Ms. Quinones that this nurse would attempt to contact her daughter Abby today to discuss the above.

## 2025-06-06 ENCOUNTER — TELEPHONE (OUTPATIENT)
Dept: MAMMOGRAPHY | Facility: HOSPITAL | Age: OVER 89
End: 2025-06-06

## 2025-06-06 NOTE — TELEPHONE ENCOUNTER
Called and spoke to daughter Abby per patient's request. Abby accepted an appt with Dr Nava for her mom on Tuesday 6-17-25 at 0945. Contact information for Dr Nava and the Breast Program Navigators provided. Daughter has no further questions at this time.

## 2025-06-09 ENCOUNTER — TELEPHONE (OUTPATIENT)
Age: OVER 89
End: 2025-06-09

## 2025-06-09 NOTE — TELEPHONE ENCOUNTER
Phoned patient's daughter, Abby, and we discussed newly diagnosed breast cancer of the patient. Introduced myself as one of the breast nurse navigators and explained the role of the breast nurse navigator. Explained the role of the physicians on her breast cancer care team. Reviewed over pathology report and discussed the type of breast cancer, grade, and ER/SD and Her 2. Briefly discussed breast cancer treatments including surgery, radiation, hormone therapy, and chemotherapy. Explained the breast cancer multidisciplinary meeting and that her case will be discussed. Discussed the breast cancer treatment handbook and at their upcoming appointment this resource will be provided. Reviewed patient's family history and she stated that her sister, patient's daughter, had breast cancer 2 years ago. Patient is scheduled with Dr. Nava on Tuesday June 17, 2025 in [place].Patient given my contact information to call with any further questions or concerns.

## 2025-06-17 ENCOUNTER — NURSE NAVIGATOR ENCOUNTER (OUTPATIENT)
Age: OVER 89
End: 2025-06-17

## 2025-06-17 ENCOUNTER — OFFICE VISIT (OUTPATIENT)
Dept: SURGERY | Facility: CLINIC | Age: OVER 89
End: 2025-06-17
Payer: MEDICARE

## 2025-06-17 VITALS
WEIGHT: 132 LBS | DIASTOLIC BLOOD PRESSURE: 72 MMHG | SYSTOLIC BLOOD PRESSURE: 122 MMHG | HEART RATE: 78 BPM | RESPIRATION RATE: 16 BRPM | OXYGEN SATURATION: 93 % | BODY MASS INDEX: 23.39 KG/M2 | HEIGHT: 63 IN

## 2025-06-17 DIAGNOSIS — C50.811 MALIGNANT NEOPLASM OF OVERLAPPING SITES OF RIGHT BREAST IN FEMALE, ESTROGEN RECEPTOR POSITIVE (HCC): Primary | ICD-10-CM

## 2025-06-17 DIAGNOSIS — Z17.0 MALIGNANT NEOPLASM OF OVERLAPPING SITES OF RIGHT BREAST IN FEMALE, ESTROGEN RECEPTOR POSITIVE (HCC): Primary | ICD-10-CM

## 2025-06-17 PROCEDURE — 99205 OFFICE O/P NEW HI 60 MIN: CPT | Performed by: SURGERY

## 2025-06-17 NOTE — PROGRESS NOTES
Breast Surgery New Patient Consultation    This is the first visit for this 89 year old woman, referred by Dr. Perez, who presents for evaluation of right breast cancer.    History of Present Illness:   Ms. Clary Quinones is a 89 year old woman who presents with a self detected mass of the right breast that was first noted about a month ago.  She denies any nipple discharge, skin changes or axillary concerns.  She does have a family history of breast cancer.  She has no personal prior history of breast disease or biopsies.  She was noted to have the palpable concern and sent for a bilateral diagnostic evaluation on May 30, 2025 where she was noted to have a suspicious mass that measured 2.4 cm at 9:00, 7 cm the nipple as well as 2 adjacent suspicious satellite lesions and a right axillary lymph node with cortical thickening and irregular mass in the left breast at the 6:00 retroareolar region.  Given the patient's age and comorbidities she only had a single site biopsy of the right breast most suspicious mass on 2025 was confirmed to have invasive ductal carcinoma, grade 2 associated with DCIS that was noted to be ER/OK positive, HER2/raji negative with a Ki-67 of 7%.  She is here today for evaluation and recommendations for further therapy.        Past Medical History[1]    Past Surgical History[2]    Gynecological History:  Pt is a   Pt was 25 years old at time of first pregnancy.    She denies any cumulative breastfeeding history.  She achieved menarche at age 12 and LMP 1973  Age of Menopause: 42  Type: hysterectomy  She denies any history of hormone replacement therapy.  She denies any history of oral contraceptive use.  She denies infertility treatment to achieve pregnancy.    Medications:    Current Medications[3]    Allergies:    Allergies[4]    Family History:   Family History[5]    She is not of Ashkenazi Anglican ancestry.    Social History:  History   Alcohol Use Not Currently     Comment:  2 a year        History   Smoking Status    Never   Smokeless Tobacco    Never     Ms. Clary Quinones is  with 3 children. She has 6 siblings. She is currently Retired   Review of Systems:  General:   The patient denies, fever, chills, night sweats, fatigue, generalized weakness, change in appetite or +weight loss.    HEENT:     The patient denies eye irritation, cataracts, redness, +glaucoma, yellowing of the eyes, change in vision, color blindness, or wearing contacts/glasses. The patient denies +hearing loss, ringing in the ears, ear drainage, earaches, nasal congestion, nose bleeds, snoring, pain in mouth/throat, hoarseness, change in voice, facial trauma.    Respiratory:  The patient denies chronic cough, +phlegm, hemoptysis, pleurisy/chest pain, pneumonia, asthma, wheezing, difficulty in breathing with exertion, emphysema, chronic bronchitis, shortness of breath or abnormal sound when breathing.     Cardiovascular:  There is no history of chest pain, chest pressure/discomfort, palpitations, +irregular heartbeat, fainting or near-fainting, difficulty breathing when lying flat, SOB/Coughing at night, swelling of the legs or chest pain while walking.    Breasts:  See history of present illness    Gastrointestinal:     There is no history of difficulty or pain with swallowing, reflux symptoms, vomiting, dark or bloody stools, constipation, yellowing of the skin, indigestion, nausea, change in bowel habits, diarrhea, abdominal pain or vomiting blood.     Genitourinary:  The patient denies +frequent urination, needing to get up at night to urinate, urinary hesitancy or retaining urine, painful urination, +urinary incontinence, decreased urine stream, blood in the urine or vaginal/penile discharge.    Skin:    The patient denies rash, itching, skin lesions, dry skin, change in skin color or change in moles.     Hematologic/Lymphatic:  The patient denies easily bruising or bleeding or persistent swollen  glands or lymph nodes.     Musculoskeletal:  The patient denies muscle aches/pain, joint pain, stiff joints, neck pain, back pain or bone pain.    Neuropsychiatric:  There is no history of migraines or severe headaches, seizure/epilepsy, speech problems, coordination problems, trembling/tremors, fainting/black outs, dizziness, memory problems, loss of sensation/numbness, +problems walking, weakness, tingling or burning in hands/feet. There is no history of abusive relationship, bipolar disorder, sleep disturbance, anxiety, depression or feeling of despair.    Endocrine:    There is no history of poor/slow wound healing, weight loss/gain, fertility or hormone problems, cold intolerance, thyroid disease.     Allergic/Immunologic:  There is no history of hives, hay fever, angioedema or anaphylaxis.    /72 (BP Location: Left arm, Patient Position: Sitting, Cuff Size: adult)   Pulse 78   Resp 16   Ht 1.6 m (5' 3\")   Wt 59.9 kg (132 lb)   SpO2 93%   BMI 23.38 kg/m²     Physical Exam:  The patient is an alert, oriented, well-nourished and  well-developed woman who appears her stated age. Her speech patterns and movements are normal. Her affect is appropriate.    HEENT: The head is normocephalic. The neck is supple. The thyroid is not enlarged and is without palpable masses/nodules. There are no palpable masses. The trachea is in the midline. Conjunctiva are clear, non-icteric.    Chest: The chest expands symmetrically. The lungs are clear to auscultation.    Heart: The rhythm is regular.  There are no murmurs, rubs, gallops or thrills.    Breasts:  Her breasts are symmetrical with a cup size 34B.  Right breast: The skin, nipple ,and areola appear normal. There is no skin dimpling with movement of the pectoralis. There is no nipple retraction. No nipple discharge can be elicited. The parenchyma is mildly nodular. There is a palpable firm but mobile 2 x 2 cm mass at the 9 o'clock position approximately 7 cm from  the nipple with no skin involvement. The axillary tail is normal.  Left breast:   The skin, nipple, and areola appear normal. There is no skin dimpling with movement of the pectoralis. There is no nipple retraction. No nipple discharge can be elicited. The parenchyma is mildly nodular. There are no dominant masses in the breast. The axillary tail is normal.    Abdomen:  The abdomen is soft, flat and non tender. The liver is not enlarged. There are no palpable masses.    Lymph Nodes:  The supraclavicular, axillary and cervical regions are free of significant lymphadenopathy.    Back: There is no vertebral column tenderness.    Skin: The skin appears normal. There are no suspicious appearing rashes or lesions.    Extremities: The extremities are without deformity, cyanosis or edema.    Impression:   Ms. Clary Quinones is a 89 year old woman presents with biopsy confirmed right breast cancer, clinical stage T2 N1 MX.    Discussion and Plan:  I had a discussion with the Patient regarding her breast exam. On exam today I found her to be healing well since recent biopsy with no other clinical findings.  I personally reviewed the recent imaging and pathology we discussed this at length.    The natural history and evolution of breast cancer were discussed with Ms. Clary Quinones and her  family, including the difference between in-situ and invasive carcinoma, and the distinction  between local and systemic disease and local and systemic therapy. For local treatment options,  I explained the risks and benefits of breast conservation and mastectomy (with or without  reconstruction), including the fact that survival rates are equal with these two approaches.  If breast conservation is elected, I explained the need for free margins, the possibility of re-  excision to achieve free margins, and the need for post-operative radiotherapy. The approach  to alice staging was also described, including the technique, risks  and benefits of sentinel node  biopsy, the possible need for axillary dissection, and the long-term sequelae of this procedure.  With regard to systemic therapy, final recommendation will be made following receipt of final  pathology post-op, but I outlined the possibility of endocrine therapy, chemotherapy, and  herceptin, depending on tumor marker profile.  Her case was discussed at multidisciplinary tumor board and due to age and comorbidities we discussed that further workup of the aforementioned imaging findings would require multiple biopsies of the right breast as well as the right axilla and the contralateral left breast.  The patient and the family wish to avoid these due to her comorbidities.  We discussed that she may be a candidate for neoadjuvant endocrine therapy as a means to palliate the growth and changes at the primary tumor site and lieu of planning a surgery at this time.  We discussed that we can reevaluate the scan to make sure that this is not involved and we addressed the appropriateness of surgery once she has met with medical oncology to see if she is a candidate for this approach.  The risks and possible complications of the procedure were explained to the patient and her family and she understood and agreed to the proposed plan. She was given ample opportunity for questions and those questions were answered to her satisfaction. She has been  encouraged to contact the office with any questions or concerns prior to her next appointment.     This note was created by [employer] voice recognition. Errors in content may be related to improper recognition by the system; efforts to review and correct have been done but errors may still exist. Please be advised the primary purpose of this note is for me to communicate medical care. Standard sentence structure is not always used. Medical terminology and medical abbreviations may be used. There may be grammatical, typographical, and automated fill ins  that may have errors missed in proofreading.              [1]   Past Medical History:   Back pain    3 months ago    Back problem    Eye disorder    Glaucoma    High blood pressure    High cholesterol    Hx of actinic keratosis    Per Dr Jose G Gupta - Actinic keratosis destroyed     Renal disorder   [2]   Past Surgical History:  Procedure Laterality Date    Appendectomy  01/01/1950    Appendectomy      Cataract  01/01/2005    Cataract surgery, complex  01/01/2005    Colonoscopy  05/1998 03/2004    Colonoscopy  04/02/2014    Procedure: COLONOSCOPY;  Surgeon: Harshad Duron MD;  Location:  ENDOSCOPY    Diagnostic anoscopy      Hysterectomy  01/01/1976    due to excessive bleeding   [3]   No outpatient medications have been marked as taking for the 6/17/25 encounter (Appointment) with Bernadette Nava MD.   [4]   Allergies  Allergen Reactions    Latex RASH    Penicillins RASH    Polysporin [Bacitracin-Polymyxin B] RASH    Adhesive Tape RASH   [5]   Family History  Problem Relation Age of Onset    Stroke Mother     Cancer Father         Lung Cancer    Stroke Sister     Heart Attack Brother     Cancer Brother     Breast Cancer Daughter 63    Prostate Cancer Son 65

## 2025-06-17 NOTE — PROGRESS NOTES
Met with patient and her two daughters in Dr. Nava's clinic. Introduced myself as one the breast nurse navigators and explained the role of the breast nurse navigator and coordination of care. Explained the role of all of the physicians involved in her care including the surgeon, medical oncologist, radiation oncologist, and possibly plastic surgeon. Patient was given the breast cancer treatment handbook and reviewed over how to use this resource. Discussed the breast multidisciplinary conference and that her case was discussed. Next step in care will be to schedule medical oncologist appointment. Scheduled patient with Dr. Krause for tomorrow, Wednesday June 18, 2025, at 1400 in [place].     Patient was provided with breast nurse navigator contact information and was encouraged to phone with any other questions or concerns.

## 2025-06-18 ENCOUNTER — OFFICE VISIT (OUTPATIENT)
Age: OVER 89
End: 2025-06-18
Attending: INTERNAL MEDICINE
Payer: MEDICARE

## 2025-06-18 VITALS
RESPIRATION RATE: 16 BRPM | SYSTOLIC BLOOD PRESSURE: 160 MMHG | WEIGHT: 132 LBS | BODY MASS INDEX: 23.39 KG/M2 | HEART RATE: 73 BPM | HEIGHT: 62.99 IN | OXYGEN SATURATION: 96 % | TEMPERATURE: 97 F | DIASTOLIC BLOOD PRESSURE: 80 MMHG

## 2025-06-18 DIAGNOSIS — C50.811 MALIGNANT NEOPLASM OF OVERLAPPING SITES OF RIGHT BREAST IN FEMALE, ESTROGEN RECEPTOR POSITIVE (HCC): Primary | ICD-10-CM

## 2025-06-18 DIAGNOSIS — Z17.0 MALIGNANT NEOPLASM OF OVERLAPPING SITES OF RIGHT BREAST IN FEMALE, ESTROGEN RECEPTOR POSITIVE (HCC): Primary | ICD-10-CM

## 2025-06-18 LAB
ALBUMIN SERPL-MCNC: 4.4 G/DL (ref 3.2–4.8)
ALBUMIN/GLOB SERPL: 1.8 {RATIO} (ref 1–2)
ALP LIVER SERPL-CCNC: 106 U/L (ref 55–142)
ALT SERPL-CCNC: 9 U/L (ref 10–49)
ANION GAP SERPL CALC-SCNC: 9 MMOL/L (ref 0–18)
AST SERPL-CCNC: 20 U/L (ref ?–34)
BASOPHILS # BLD AUTO: 0.05 X10(3) UL (ref 0–0.2)
BASOPHILS NFR BLD AUTO: 0.8 %
BILIRUB SERPL-MCNC: 0.2 MG/DL (ref 0.2–0.9)
BUN BLD-MCNC: 35 MG/DL (ref 9–23)
CALCIUM BLD-MCNC: 9.9 MG/DL (ref 8.7–10.6)
CHLORIDE SERPL-SCNC: 108 MMOL/L (ref 98–112)
CO2 SERPL-SCNC: 26 MMOL/L (ref 21–32)
CREAT BLD-MCNC: 1.33 MG/DL (ref 0.55–1.02)
EGFRCR SERPLBLD CKD-EPI 2021: 37 ML/MIN/1.73M2 (ref 60–?)
EOSINOPHIL # BLD AUTO: 0.44 X10(3) UL (ref 0–0.7)
EOSINOPHIL NFR BLD AUTO: 7.2 %
ERYTHROCYTE [DISTWIDTH] IN BLOOD BY AUTOMATED COUNT: 12.5 %
GLOBULIN PLAS-MCNC: 2.4 G/DL (ref 2–3.5)
GLUCOSE BLD-MCNC: 120 MG/DL (ref 70–99)
HCT VFR BLD AUTO: 37.4 % (ref 35–48)
HGB BLD-MCNC: 12.1 G/DL (ref 12–16)
IMM GRANULOCYTES # BLD AUTO: 0.02 X10(3) UL (ref 0–1)
IMM GRANULOCYTES NFR BLD: 0.3 %
LDH SERPL L TO P-CCNC: 168 U/L (ref 120–246)
LYMPHOCYTES # BLD AUTO: 0.82 X10(3) UL (ref 1–4)
LYMPHOCYTES NFR BLD AUTO: 13.4 %
MCH RBC QN AUTO: 31.7 PG (ref 26–34)
MCHC RBC AUTO-ENTMCNC: 32.4 G/DL (ref 31–37)
MCV RBC AUTO: 97.9 FL (ref 80–100)
MONOCYTES # BLD AUTO: 0.58 X10(3) UL (ref 0.1–1)
MONOCYTES NFR BLD AUTO: 9.4 %
NEUTROPHILS # BLD AUTO: 4.23 X10 (3) UL (ref 1.5–7.7)
NEUTROPHILS # BLD AUTO: 4.23 X10(3) UL (ref 1.5–7.7)
NEUTROPHILS NFR BLD AUTO: 68.9 %
OSMOLALITY SERPL CALC.SUM OF ELEC: 305 MOSM/KG (ref 275–295)
PLATELET # BLD AUTO: 239 10(3)UL (ref 150–450)
POTASSIUM SERPL-SCNC: 4.4 MMOL/L (ref 3.5–5.1)
PROT SERPL-MCNC: 6.8 G/DL (ref 5.7–8.2)
RBC # BLD AUTO: 3.82 X10(6)UL (ref 3.8–5.3)
SODIUM SERPL-SCNC: 143 MMOL/L (ref 136–145)
WBC # BLD AUTO: 6.1 X10(3) UL (ref 4–11)

## 2025-06-18 RX ORDER — LETROZOLE 2.5 MG/1
2.5 TABLET, FILM COATED ORAL DAILY
Qty: 90 TABLET | Refills: 3 | Status: SHIPPED | OUTPATIENT
Start: 2025-06-18

## 2025-06-18 NOTE — CONSULTS
[facility] Report of Consultation    Patient Name: Clary Quinones   YOB: 1930   Medical Record Number: [id number]   CSN: [id number]   Consulting Physician: Darnell Krause MD  Referring Physician(s): No ref. provider found  Date of Consultation: 6/18/2025     Reason for Consultation:  Clary Quinones was [facility] in the [facility] for evaluation and management of right breast cancer.    History of Present Illness  Clary Quinones is a 89 year old female with invasive ductal carcinoma who presents for oncology consultation. She is accompanied by her daughter, who is also her caregiver. She was referred by Dr. Dennis for further evaluation of her breast cancer.    She noticed a lump in her right breast, which she then showed to her daughter. A mammogram and ultrasound on May 30, 2025, revealed a 2 x 2.1 x 2.4 cm mass in the right breast along with additional smaller masses. A biopsy on June 4, 2025, confirmed invasive ductal carcinoma, grade 2. Further testing showed estrogen receptor greater than 95% positive, progesterone receptor 70% positive, KI-67 at 7%, and HER2 at 1+.    She has experienced weight loss over the past year despite maintaining a good appetite, as noted by her daughter. No new pain, including spine, hip, or abdominal pain, except for occasional transient tummy aches. No significant changes in breathing but acknowledges limited physical activity and uses a walker for mobility. She has not attempted climbing stairs recently.    Her current medications include treatments for blood pressure, cholesterol, and glaucoma. She is blind in her left eye and has limited vision in her right eye. Her past medical history includes a hysterectomy at age 45, cataract surgery, and appendectomy. She has mild kidney dysfunction.    Her family history is significant for a brother with cancer, a daughter with breast cancer, and a son with prostate cancer. Her sister underwent genetic  testing two years ago, which was negative. She has never smoked and consumes alcohol only occasionally.    Past Medical History:  Past Medical History[1]    Past Surgical History:  Past Surgical History[2]    Family Medical History:  Family History[3]    Gyne History:  OB History    Para Term  AB Living   3 3 0 0 0 0   SAB IAB Ectopic Multiple Live Births   0 0 0 0 0       Psychosocial History:  Social History     Socioeconomic History    Marital status:      Spouse name: Not on file    Number of children: Not on file    Years of education: Not on file    Highest education level: Not on file   Occupational History    Occupation: retired    Tobacco Use    Smoking status: Never    Smokeless tobacco: Never   Vaping Use    Vaping status: Never Used   Substance and Sexual Activity    Alcohol use: Not Currently     Alcohol/week: 0.0 standard drinks of alcohol     Comment: 2 a year     Drug use: No    Sexual activity: Not on file   Other Topics Concern     Service Not Asked    Blood Transfusions Not Asked    Caffeine Concern Yes     Comment: 1 coffee or 1 tea  daily    Occupational Exposure Not Asked    Hobby Hazards Not Asked    Sleep Concern Not Asked    Stress Concern Not Asked    Weight Concern Not Asked    Special Diet Not Asked    Back Care Not Asked    Exercise Yes     Comment: house work     Bike Helmet Not Asked    Seat Belt Yes    Self-Exams Yes     Comment: self breast exam occasionally   Social History Narrative    Not on file     Social Drivers of Health     Food Insecurity: No Food Insecurity (2025)    Food Insecurity     Food Insecurity: Never true   Transportation Needs: No Transportation Needs (2025)    Transportation Needs     Lack of Transportation: No     Car Seat: Not on file   Housing Stability: Low Risk  (2025)    Housing Stability     Housing Instability: No     Housing Instability Emergency: Not on file     Crib or Bassinette: Not on file       Allergies:    Allergies[4]    Current Medications:  Medications - Current[5]    Review of Systems:      Constitutional: Negative for anorexia, fevers, chills, night sweats.  Eyes: Negative for visual disturbance, irritation and redness.  Respiratory: Negative for cough, hemoptysis, chest pain, or dyspnea.  Cardiovascular: Negative for angina, orthopnea or palpitations.  Gastrointestinal: Negative for nausea, vomiting, change in bowel habits, diarrhea, constipation and abdominal pain.  Integument/breast: Negative for rash, skin lesions, and pruritus.  Hematologic/lymphatic: Negative for easy bruising, bleeding, and lymphadenopathy.  Psychiatric: The patient's mood was calm and appropriate for this visit.    The pertinent positives and negatives were described in the HPI and above. All other systems were negative.      Vital Signs:  Height: 160 cm (5' 2.99\") (06/18 1400)  Weight: 59.9 kg (132 lb) (06/18 1400)  BSA (Calculated - sq m): 1.62 sq meters (06/18 1400)  Pulse: 73 (06/18 1400)  BP: 160/80 (06/18 1400)  Temp: 97.3 °F (36.3 °C) (06/18 1400)  Do Not Use - Resp Rate: --  SpO2: 96 % (06/18 1400)    Physical Examination:    Constitutional: Patient is alert and oriented x 3, not in acute distress.  HEENT:  Oropharynx is clear. Neck is supple.  Respiratory: Clear to auscultation and percussion. No rales.  No wheezes.  Cardiovascular: Regular rate and rhythm.   Breast: The right breast has an outer, lower mass measuring about 2 cm with some skin dimpling.  Gastrointestinal: Soft, non tender with good bowel sounds.  Extremities: No edema. No calf tenderness.  Lymphatics: There is no palpable lymphadenopathy throughout in the cervical, supraclavicular, or axillary regions.    Labs reviewed at this visit:  Lab Results   Component Value Date    WBC 6.1 06/18/2025    RBC 3.82 06/18/2025    HGB 12.1 06/18/2025    HCT 37.4 06/18/2025    MCV 97.9 06/18/2025    MCH 31.7 06/18/2025    MCHC 32.4 06/18/2025    RDW 12.5 06/18/2025    PLT  239.0 06/18/2025     Lab Results   Component Value Date     06/18/2025    K 4.4 06/18/2025     06/18/2025    CO2 26.0 06/18/2025    BUN 35 (H) 06/18/2025    CREATSERUM 1.33 (H) 06/18/2025     (H) 06/18/2025    CA 9.9 06/18/2025    ALKPHO 106 06/18/2025    ALT 9 (L) 06/18/2025    AST 20 06/18/2025    BILT 0.2 06/18/2025    ALB 4.4 06/18/2025    TP 6.8 06/18/2025     Lab Component   Component Value Date/Time     06/18/2025 1449        Results  LABS  CBC: Normal (01/17/2025)  Chemistry panel: Normal except borderline kidney function (01/17/2025)    RADIOLOGY  Mammogram: Right breast mass 2.0 x 2.1 x 2.4 cm, additional smaller masses (05/30/2025)    PATHOLOGY  Biopsy: Invasive ductal carcinoma, grade 2, ER >95% positive, TX 70%, KI-67 7%, HER2 1+ (06/04/2025)     Radiologic imaging reviewed at this visit:    Mammogram on 5/30/2025:  BREAST COMPOSITION:  Category B - There are scattered areas of fibroglandular density.     FINDINGS:  Focal asymmetry in the outer right breast at 9 o'clock with additional asymmetries in the outer right breast are noted.  Enlarged right axillary adenopathy is present.  There is subtle asymmetry in the retroareolar left breast at anterior  depth.  Bilateral breast ultrasound is recommended.       Ultrasound of the right breast demonstrates a spiculated mass at 9 o'clock, 7 cm from the nipple measuring up to 2.0 x 2.1 x 2.4 cm.  An additional mass at 9 o'clock, 5 cm from the nipple is noted measuring up to 0.7 x 0.5 x 0.5 cm.  A 3rd mass at 10  o'clock, 3 cm from the nipple is noted measuring up to 0.7 x 0.3 x 0.4 cm.  A hypoechoic mass in the right axilla is noted measuring up to 1.1 x 1.5 x 1.3 cm likely representing a pathologic lymph node.     Ultrasound left breast demonstrates an irregular hypoechoic mass in the 6 o'clock retroareolar region corresponding to the asymmetry on mammography measuring up to 0.6 x 0.3 x 0.6 cm.     After the discussion with the  patient a biopsy of the dominant right breast mass is recommended prior to surgical consultation     Impression   CONCLUSION:    1. Ultrasound-guided biopsy of a dominant right breast mass at 9 o'clock, 7 cm from nipple is recommended.  Subsequent surgical consultation is advised.  2. Additional suspicious appearing masses in the bilateral breasts as above.  These may be sampled with ultrasound guidance if indicated.         Assessment & Plan  Invasive ductal carcinoma of right breast  ER > 95%  NC 70%  Ki-67 7%  Her2 IHC 1+  Grade II    Invasive ductal carcinoma of the right breast, grade 2, with a mass measuring 2 x 2.1 x 2.4 cm and additional smaller masses. Estrogen receptor positive (>95%), progesterone receptor positive (70%), KI-67 at 7%, and HER2 negative (1+). The cancer is less aggressive and slow-growing, typical for older age. Surgical removal is not favored due to multiple areas involved and her age (89 years). Non-surgical management with hormone therapy is considered appropriate. Letrozole, an aromatase inhibitor, is preferred due to its efficacy in estrogen receptor-positive cancers and lower risk profile compared to tamoxifen, particularly in older patients.  - Order blood work to assess liver enzymes and other relevant parameters.  - Prescribe letrozole 2.5 mg daily indefinitely, with monitoring for effectiveness and side effects.  - Re-evaluate in 3 months to assess response to letrozole and adjust management as needed.    Weight loss  Unexplained weight loss over the past year despite adequate food intake. Differential includes cancer-related cachexia or age-related metabolic changes. Further investigation with blood work to assess liver function and other potential causes.  - Order blood work to assess liver enzymes and other relevant parameters.  Labs show renal insufficiency but no other abnormality. We decided to hold off on other work up and treat with the AI and reassess in three  months.    Kidney dysfunction  Mild kidney dysfunction noted. Consideration of kidney function in decision-making for imaging and treatment. Avoidance of contrast in imaging due to potential kidney injury.    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    Hypertension  Hypertension managed with medication.    Hyperlipidemia  Hyperlipidemia managed with medication.    Follow-up  Follow-up plan to monitor response to letrozole and overall health status.  - Schedule follow-up appointment in 3 months to assess response to letrozole and adjust management as needed.         The following individual(s) verbally consented to be recorded using ambient AI listening technology and understand that they can each withdraw their consent to this listening technology at any point by asking the clinician to turn off or pause the recording:    Patient name: Clary Quinones  Additional names:  Abby Krause MD        [1]   Past Medical History:   Glaucoma    High blood pressure    High cholesterol    Hx of actinic keratosis    Per Dr Jose G Gupta - Actinic keratosis destroyed     Renal disorder   [2]   Past Surgical History:  Procedure Laterality Date    Appendectomy  01/01/1950    Cataract  01/01/2005    Colonoscopy  05/1998 03/2004    Colonoscopy  04/02/2014    Procedure: COLONOSCOPY;  Surgeon: Harshad Duron MD;  Location:  ENDOSCOPY    Diagnostic anoscopy      Hysterectomy  01/01/1976    due to excessive bleeding   [3]   Family History  Problem Relation Age of Onset    Stroke Mother     Stroke Sister     Heart Attack Brother     Cancer Brother     Breast Cancer Daughter 63    Prostate Cancer Son 65   [4]   Allergies  Allergen Reactions    Latex RASH    Penicillins RASH    Polysporin [Bacitracin-Polymyxin B] RASH    Adhesive Tape RASH   [5]   Current Outpatient Medications:     letrozole 2.5 MG Oral Tab, Take 1 tablet (2.5 mg total)  by mouth daily., Disp: 90 tablet, Rfl: 3    losartan 50 MG Oral Tab, Take 1 tablet (50 mg total) by mouth daily., Disp: 90 tablet, Rfl: 3    Lovastatin 20 MG Oral Tab, Take 1 tablet (20 mg total) by mouth every evening., Disp: 90 tablet, Rfl: 3    prednisoLONE 1 % Ophthalmic Suspension, Place 1 drop into the right eye in the morning and 1 drop before bedtime., Disp: , Rfl:     ROCKLATAN 0.02-0.005 % Ophthalmic Solution, Place 1 drop into the right eye nightly., Disp: , Rfl:     atropine 1 % Ophthalmic Solution, Place 1 drop into both eyes in the morning and 1 drop before bedtime., Disp: , Rfl:     Acetaminophen Extra Strength 500 MG Oral Tab, Take 2 tablets (1,000 mg total) by mouth every 6 (six) hours., Disp: , Rfl:     dorzolamide-timolol 2-0.5 % Ophthalmic Solution, Place 1 drop into both eyes in the morning and 1 drop before bedtime., Disp: , Rfl:     PEG 3350 Oral Powd Pack, Take 17 g by mouth in the morning., Disp: , Rfl:     Probiotic Product (PROBIOTIC-10 OR), Take by mouth., Disp: , Rfl:     Niacin 500 MG Oral Tab, Take 1 tablet (500 mg total) by mouth daily with breakfast., Disp: , Rfl:     Omega-3 Fatty Acids (FISH OIL) 1000 MG Oral Cap, Take 1,000 mg by mouth in the morning., Disp: , Rfl:     Multiple Vitamins-Minerals (MULTIVITAMIN & MINERAL OR), Take 1 tablet by mouth in the morning., Disp: , Rfl:

## 2025-06-18 NOTE — PROGRESS NOTES
Patient here for new consult for right breast cancer. Patient had consult with Dr Nava yesterday with no scheduled surgeries. Patient is accompanied by her daughter Abby Saunders.     Education Record    Learner:  Patient and Family Member daughter Abby Saunders     Disease / Diagnosis: right breast cancer     Barriers / Limitations:  None   Comments:    Method:  Discussion   Comments:    General Topics:  Medication and Plan of care reviewed   Comments:    Outcome:  Shows understanding   Comments:

## 2025-06-27 ENCOUNTER — TELEMEDICINE (OUTPATIENT)
Dept: FAMILY MEDICINE CLINIC | Facility: CLINIC | Age: OVER 89
End: 2025-06-27
Payer: MEDICARE

## 2025-06-27 VITALS
RESPIRATION RATE: 14 BRPM | BODY MASS INDEX: 23 KG/M2 | HEART RATE: 68 BPM | DIASTOLIC BLOOD PRESSURE: 70 MMHG | HEIGHT: 62.99 IN | SYSTOLIC BLOOD PRESSURE: 122 MMHG | OXYGEN SATURATION: 97 %

## 2025-06-27 DIAGNOSIS — R73.03 PREDIABETES: ICD-10-CM

## 2025-06-27 DIAGNOSIS — E03.8 SUBCLINICAL HYPOTHYROIDISM: ICD-10-CM

## 2025-06-27 DIAGNOSIS — N18.4 CHRONIC KIDNEY DISEASE, STAGE 4 (SEVERE) (HCC): ICD-10-CM

## 2025-06-27 PROCEDURE — 99213 OFFICE O/P EST LOW 20 MIN: CPT | Performed by: FAMILY MEDICINE

## 2025-06-27 NOTE — ASSESSMENT & PLAN NOTE
Thyroid shows Good control.   3/21/2024: TSH 3.290  current treatment plan effective, no change in therapy   Orders:    TSH and Free T4; Future; Expected date: 08/26/2025

## 2025-06-27 NOTE — ASSESSMENT & PLAN NOTE
3/21/2024: HgbA1C 6.2 (H)  6/18/2025: Glucose 120 (H) Sugar control is stable  Continue with current treatment plan  Pre-Diabetic. Not currently on Diabetic meds.   Orders:    Hemoglobin A1C; Future; Expected date: 08/26/2025

## 2025-06-27 NOTE — PATIENT INSTRUCTIONS
Future Appointments   Date Time Provider Department Center   9/24/2025 10:30 AM NP OOT NP  Inf [facility]   9/24/2025 11:00 AM Darnell Krause MD NP  HemOnc [facility]   2/27/2026 11:30 AM Camilo Perez MD [facility]

## 2025-06-27 NOTE — PROGRESS NOTES
Subjective:   Clary is a 89 year old female coming in for had concerns including Lab (Follow up ).   HPI  History of Present Illness       6 month follow up. Labs ordered for late summer.   Due to elevator issue. We switched to video but then she was seen in person as she was in the buiding.     /70   Pulse 68   Resp 14   Ht 5' 2.99\" (1.6 m)   SpO2 97%   BMI 23.39 kg/m²  Body mass index is 23.39 kg/m².   Physical Exam  Constitutional:       Appearance: She is well-developed.   HENT:      Head: Normocephalic and atraumatic.   Pulmonary:      Effort: Pulmonary effort is normal. No respiratory distress.   Musculoskeletal:         General: Normal range of motion.      Cervical back: Normal range of motion.   Skin:     Findings: No rash.   Neurological:      Mental Status: She is alert and oriented to person, place, and time.   Psychiatric:         Mood and Affect: Mood normal.         Behavior: Behavior normal.         Thought Content: Thought content normal.         Judgment: Judgment normal.        Physical Exam          Results       Assessment & Plan  Subclinical hypothyroidism  Thyroid shows Good control.   3/21/2024: TSH 3.290  current treatment plan effective, no change in therapy   Orders:    TSH and Free T4; Future; Expected date: 08/26/2025    Prediabetes  3/21/2024: HgbA1C 6.2 (H)  6/18/2025: Glucose 120 (H) Sugar control is stable  Continue with current treatment plan  Pre-Diabetic. Not currently on Diabetic meds.   Orders:    Hemoglobin A1C; Future; Expected date: 08/26/2025    Chronic kidney disease, stage 4 (severe) (HCC)  Last Glomerular Filtration Rate: CKD 3b (GFR 37). .   6/18/2025: Creatinine 1.33 (H) CKD etiologies : Hypertension  Plan/Management: Control Hypertension/Diabetes   Orders:    Comp Metabolic Panel (14); Future; Expected date: 08/26/2025    CBC With Differential With Platelet; Future; Expected date: 08/26/2025             Assessment & Plan    I am having Clary Quinones  maintain her niacin, omega-3 fatty acids, Multiple Vitamins-Minerals (MULTIVITAMIN & MINERAL OR), Probiotic Product (PROBIOTIC-10 OR), polyethylene glycol (PEG 3350), dorzolamide-timolol, Acetaminophen Extra Strength, prednisoLONE, Rocklatan, atropine, Lovastatin, losartan, and letrozole.       Return in about 7 months (around 2/2/2026) for AWV with chonic condition follow up.

## 2025-06-27 NOTE — ASSESSMENT & PLAN NOTE
Last Glomerular Filtration Rate: CKD 3b (GFR 37). .   6/18/2025: Creatinine 1.33 (H) CKD etiologies : Hypertension  Plan/Management: Control Hypertension/Diabetes   Orders:    Comp Metabolic Panel (14); Future; Expected date: 08/26/2025    CBC With Differential With Platelet; Future; Expected date: 08/26/2025

## 2025-07-14 ENCOUNTER — TELEPHONE (OUTPATIENT)
Age: OVER 89
End: 2025-07-14

## 2025-07-14 NOTE — TELEPHONE ENCOUNTER
Pt daughter Abby called she stated that the pt current cancer medication is causing her nausea and she would like to know if the pt can take something different that doesn't cause her to be nauseous.      Please call pt philip Mora at [phone]

## 2025-07-14 NOTE — TELEPHONE ENCOUNTER
Returned patient's daughter call regarding daily nausea. Patient's daughter Abby states that her mom has been nauseous once a day for last couple weeks. Patient started letrozole in June. I advised Abby that I would forward message to Dr Krause and that she should let her PCP aware also. Patient's daughter conveyed understanding.

## 2025-07-15 ENCOUNTER — TELEPHONE (OUTPATIENT)
Age: OVER 89
End: 2025-07-15

## 2025-07-15 NOTE — TELEPHONE ENCOUNTER
Returned patient's daughter Abby call. Per Dr Krause she is to hold letrozole for 7 days and let us know if she feels better after the 7 days. Patient's daughter Abby conveyed understanding.

## 2025-07-24 ENCOUNTER — TELEPHONE (OUTPATIENT)
Facility: LOCATION | Age: OVER 89
End: 2025-07-24

## 2025-07-24 NOTE — TELEPHONE ENCOUNTER
Patient daughter Abby is calling as requested to inform Dr. Krause patient has stop taking the Letrozole for a week and no nausea since she stopped the medication    Please contact Abby.

## 2025-07-24 NOTE — TELEPHONE ENCOUNTER
Returned patient's daughter Abby calling concerning letrozole and nausea. Patient held letrozole for a week and states her nausea resolved. Patient and daughter are inquiring about switching to anastrozole. I advised daughter that I would update Dr Krause and send [employer] message with POC. Abby, patient's daughter conveyed understanding.

## 2025-07-25 ENCOUNTER — TELEPHONE (OUTPATIENT)
Facility: LOCATION | Age: OVER 89
End: 2025-07-25

## 2025-07-25 RX ORDER — ANASTROZOLE 1 MG/1
1 TABLET ORAL DAILY
Qty: 30 TABLET | Refills: 3 | Status: SHIPPED | OUTPATIENT
Start: 2025-07-25

## 2025-07-25 NOTE — TELEPHONE ENCOUNTER
Called daughter max to advise her that Dr Krause sent a prescription of anastrozole to her mom's pharmacy. Max conveyed understanding.

## 2025-08-10 ENCOUNTER — HOSPITAL ENCOUNTER (OUTPATIENT)
Facility: HOSPITAL | Age: OVER 89
Setting detail: OBSERVATION
Discharge: HOME | End: 2025-08-11
Attending: EMERGENCY MEDICINE | Admitting: HOSPITALIST

## 2025-08-10 ENCOUNTER — APPOINTMENT (OUTPATIENT)
Dept: GENERAL RADIOLOGY | Age: OVER 89
End: 2025-08-10
Attending: EMERGENCY MEDICINE

## 2025-08-10 DIAGNOSIS — R00.1 BRADYCARDIA: ICD-10-CM

## 2025-08-10 DIAGNOSIS — R07.89 CHEST PRESSURE: ICD-10-CM

## 2025-08-10 DIAGNOSIS — I48.91 ATRIAL FIBRILLATION, NEW ONSET (HCC): Primary | ICD-10-CM

## 2025-08-10 LAB
ALBUMIN SERPL-MCNC: 4.3 G/DL (ref 3.2–4.8)
ALBUMIN/GLOB SERPL: 1.7 (ref 1–2)
ALP LIVER SERPL-CCNC: 109 U/L (ref 55–142)
ALT SERPL-CCNC: 9 U/L (ref 10–49)
ANION GAP SERPL CALC-SCNC: 9 MMOL/L (ref 0–18)
AST SERPL-CCNC: 23 U/L (ref ?–34)
ATRIAL RATE: 330 BPM
BASOPHILS # BLD AUTO: 0.04 X10(3) UL (ref 0–0.2)
BASOPHILS NFR BLD AUTO: 0.5 %
BILIRUB SERPL-MCNC: 0.5 MG/DL (ref 0.2–0.9)
BUN BLD-MCNC: 28 MG/DL (ref 9–23)
CALCIUM BLD-MCNC: 9.8 MG/DL (ref 8.7–10.6)
CHLORIDE SERPL-SCNC: 107 MMOL/L (ref 98–112)
CO2 SERPL-SCNC: 23 MMOL/L (ref 21–32)
CREAT BLD-MCNC: 1.35 MG/DL (ref 0.55–1.02)
D DIMER PPP FEU-MCNC: 0.5 UG/ML FEU (ref ?–0.94)
EGFRCR SERPLBLD CKD-EPI 2021: 36 ML/MIN/1.73M2 (ref 60–?)
EOSINOPHIL # BLD AUTO: 0.2 X10(3) UL (ref 0–0.7)
EOSINOPHIL NFR BLD AUTO: 2.4 %
ERYTHROCYTE [DISTWIDTH] IN BLOOD BY AUTOMATED COUNT: 12.3 %
GLOBULIN PLAS-MCNC: 2.6 G/DL (ref 2–3.5)
GLUCOSE BLD-MCNC: 121 MG/DL (ref 70–99)
HCT VFR BLD AUTO: 39.7 % (ref 35–48)
HGB BLD-MCNC: 13.3 G/DL (ref 12–16)
IMM GRANULOCYTES # BLD AUTO: 0.03 X10(3) UL (ref 0–1)
IMM GRANULOCYTES NFR BLD: 0.4 %
LYMPHOCYTES # BLD AUTO: 1.06 X10(3) UL (ref 1–4)
LYMPHOCYTES NFR BLD AUTO: 12.5 %
MCH RBC QN AUTO: 32.1 PG (ref 26–34)
MCHC RBC AUTO-ENTMCNC: 33.5 G/DL (ref 31–37)
MCV RBC AUTO: 95.9 FL (ref 80–100)
MONOCYTES # BLD AUTO: 0.79 X10(3) UL (ref 0.1–1)
MONOCYTES NFR BLD AUTO: 9.3 %
NEUTROPHILS # BLD AUTO: 6.37 X10 (3) UL (ref 1.5–7.7)
NEUTROPHILS # BLD AUTO: 6.37 X10(3) UL (ref 1.5–7.7)
NEUTROPHILS NFR BLD AUTO: 74.9 %
NT-PROBNP SERPL-MCNC: 2382 PG/ML (ref ?–450)
OSMOLALITY SERPL CALC.SUM OF ELEC: 295 MOSM/KG (ref 275–295)
PLATELET # BLD AUTO: 227 10(3)UL (ref 150–450)
POTASSIUM SERPL-SCNC: 4.6 MMOL/L (ref 3.5–5.1)
PROT SERPL-MCNC: 6.9 G/DL (ref 5.7–8.2)
Q-T INTERVAL: 350 MS
QRS DURATION: 72 MS
QTC CALCULATION (BEZET): 378 MS
R AXIS: -32 DEGREES
RBC # BLD AUTO: 4.14 X10(6)UL (ref 3.8–5.3)
SODIUM SERPL-SCNC: 139 MMOL/L (ref 136–145)
T AXIS: 31 DEGREES
TROPONIN I SERPL HS-MCNC: 26 NG/L (ref ?–34)
VENTRICULAR RATE: 70 BPM
WBC # BLD AUTO: 8.5 X10(3) UL (ref 4–11)

## 2025-08-10 PROCEDURE — 99222 1ST HOSP IP/OBS MODERATE 55: CPT | Performed by: HOSPITALIST

## 2025-08-10 PROCEDURE — 71045 X-RAY EXAM CHEST 1 VIEW: CPT | Performed by: EMERGENCY MEDICINE

## 2025-08-10 RX ORDER — PREDNISOLONE ACETATE 10 MG/ML
1 SUSPENSION/ DROPS OPHTHALMIC 2 TIMES DAILY
Status: DISCONTINUED | OUTPATIENT
Start: 2025-08-10 | End: 2025-08-10

## 2025-08-10 RX ORDER — ACETAMINOPHEN 500 MG
1000 TABLET ORAL EVERY 6 HOURS PRN
Status: DISCONTINUED | OUTPATIENT
Start: 2025-08-10 | End: 2025-08-11

## 2025-08-10 RX ORDER — LETROZOLE 2.5 MG/1
2.5 TABLET, FILM COATED ORAL DAILY
Status: DISCONTINUED | OUTPATIENT
Start: 2025-08-10 | End: 2025-08-11

## 2025-08-10 RX ORDER — ACETAMINOPHEN 500 MG
1000 TABLET ORAL EVERY 6 HOURS
Status: DISCONTINUED | OUTPATIENT
Start: 2025-08-10 | End: 2025-08-10

## 2025-08-10 RX ORDER — PREDNISOLONE ACETATE 10 MG/ML
1 SUSPENSION/ DROPS OPHTHALMIC 3 TIMES DAILY
Status: DISCONTINUED | OUTPATIENT
Start: 2025-08-10 | End: 2025-08-11

## 2025-08-10 RX ORDER — HEPARIN SODIUM 5000 [USP'U]/ML
5000 INJECTION, SOLUTION INTRAVENOUS; SUBCUTANEOUS EVERY 12 HOURS SCHEDULED
Status: DISCONTINUED | OUTPATIENT
Start: 2025-08-10 | End: 2025-08-11

## 2025-08-10 RX ORDER — ATROPINE SULFATE 10 MG/ML
1 SOLUTION/ DROPS OPHTHALMIC 2 TIMES DAILY
Status: DISCONTINUED | OUTPATIENT
Start: 2025-08-10 | End: 2025-08-11

## 2025-08-10 RX ORDER — LOSARTAN POTASSIUM 50 MG/1
50 TABLET ORAL DAILY
Status: DISCONTINUED | OUTPATIENT
Start: 2025-08-10 | End: 2025-08-11

## 2025-08-10 RX ORDER — MORPHINE SULFATE 2 MG/ML
0.5 INJECTION, SOLUTION INTRAMUSCULAR; INTRAVENOUS ONCE
Status: DISCONTINUED | OUTPATIENT
Start: 2025-08-10 | End: 2025-08-11

## 2025-08-10 RX ORDER — PRAVASTATIN SODIUM 10 MG
10 TABLET ORAL NIGHTLY
Status: DISCONTINUED | OUTPATIENT
Start: 2025-08-10 | End: 2025-08-11

## 2025-08-10 RX ORDER — ASPIRIN 81 MG/1
81 TABLET ORAL DAILY
Status: DISCONTINUED | OUTPATIENT
Start: 2025-08-10 | End: 2025-08-11

## 2025-08-10 RX ORDER — PANTOPRAZOLE SODIUM 40 MG/1
40 TABLET, DELAYED RELEASE ORAL
Status: DISCONTINUED | OUTPATIENT
Start: 2025-08-11 | End: 2025-08-11

## 2025-08-10 RX ORDER — ASPIRIN 81 MG/1
81 TABLET ORAL DAILY
COMMUNITY

## 2025-08-10 RX ORDER — OMEPRAZOLE 20 MG/1
20 CAPSULE, DELAYED RELEASE ORAL
COMMUNITY

## 2025-08-10 RX ORDER — TROSPIUM CHLORIDE ER 60 MG/1
CAPSULE ORAL
Status: ON HOLD | COMMUNITY
End: 2025-08-10

## 2025-08-10 RX ORDER — DORZOLAMIDE HYDROCHLORIDE AND TIMOLOL MALEATE 20; 5 MG/ML; MG/ML
1 SOLUTION/ DROPS OPHTHALMIC 2 TIMES DAILY
Status: DISCONTINUED | OUTPATIENT
Start: 2025-08-10 | End: 2025-08-11

## 2025-08-11 VITALS
BODY MASS INDEX: 22.15 KG/M2 | HEART RATE: 83 BPM | DIASTOLIC BLOOD PRESSURE: 65 MMHG | RESPIRATION RATE: 26 BRPM | WEIGHT: 125 LBS | OXYGEN SATURATION: 92 % | TEMPERATURE: 98 F | SYSTOLIC BLOOD PRESSURE: 97 MMHG | HEIGHT: 63 IN

## 2025-08-11 LAB
ATRIAL RATE: 96 BPM
GLUCOSE BLD-MCNC: 157 MG/DL (ref 70–99)
P AXIS: 22 DEGREES
P-R INTERVAL: 278 MS
Q-T INTERVAL: 338 MS
Q-T INTERVAL: 394 MS
QRS DURATION: 80 MS
QRS DURATION: 86 MS
QTC CALCULATION (BEZET): 369 MS
QTC CALCULATION (BEZET): 427 MS
R AXIS: -32 DEGREES
R AXIS: -33 DEGREES
T AXIS: 20 DEGREES
T AXIS: 31 DEGREES
VENTRICULAR RATE: 53 BPM
VENTRICULAR RATE: 96 BPM

## 2025-08-12 ENCOUNTER — PATIENT OUTREACH (OUTPATIENT)
Age: OVER 89
End: 2025-08-12

## 2025-08-20 ENCOUNTER — OFFICE VISIT (OUTPATIENT)
Dept: FAMILY MEDICINE CLINIC | Facility: CLINIC | Age: OVER 89
End: 2025-08-20

## 2025-08-20 VITALS
RESPIRATION RATE: 16 BRPM | TEMPERATURE: 98 F | WEIGHT: 128 LBS | SYSTOLIC BLOOD PRESSURE: 118 MMHG | BODY MASS INDEX: 23 KG/M2 | DIASTOLIC BLOOD PRESSURE: 58 MMHG | HEART RATE: 81 BPM | OXYGEN SATURATION: 93 %

## 2025-08-20 DIAGNOSIS — I10 PRIMARY HYPERTENSION: ICD-10-CM

## 2025-08-20 DIAGNOSIS — Z09 HOSPITAL DISCHARGE FOLLOW-UP: Primary | ICD-10-CM

## 2025-08-20 DIAGNOSIS — I48.92 ATRIAL FLUTTER, UNSPECIFIED TYPE (HCC): ICD-10-CM

## 2025-08-20 DIAGNOSIS — N39.41 URGE INCONTINENCE OF URINE: ICD-10-CM

## 2025-08-20 PROCEDURE — 99495 TRANSJ CARE MGMT MOD F2F 14D: CPT | Performed by: PHYSICIAN ASSISTANT

## 2025-08-26 ENCOUNTER — TELEPHONE (OUTPATIENT)
Dept: FAMILY MEDICINE CLINIC | Facility: CLINIC | Age: OVER 89
End: 2025-08-26

## (undated) NOTE — Clinical Note
Attempted to completed transitions of care call with patient. The patient reported she has been doing good since her discharge home from the hospital but reports her blood pressure was high this morning. Patient declined further assessment. A condition update was sent to the office. The patient is scheduled for a TCM/HFU with you on 01/23/2025.  Thank you.

## (undated) NOTE — MR AVS SNAPSHOT
[facility]  [place]  [address] [id number] [place] [address]               Thank you for choosing us for your health care visit with Gilberto Sidhu MD.  We are glad to serve you and happy to provide you with this summa Diabetes Screening      HbgA1C    At Least  Annually for Diabetics HGBA1C (%)   Date Value   05/01/2017 5.9*   05/01/2014 5.9*    No flowsheet data found.     Fasting Blood Sugar (FSB)   Patient must be diagnosed with one of the following:   • Hypertension Flex Sigmoidoscopy Screen  Covered every 5 years No results found for this or any previous visit. No flowsheet data found. Fecal Occult Blood   Covered Annually No results found for: FOB, OCCULTSTOOL No flowsheet data found.      Barium Enema-   uncomf Please get once after your 65th birthday    Hepatitis B for Moderate/High Risk       No orders found for this or any previous visit.  Medium/high risk factors:   End-stage renal disease   Hemophiliacs who received Factor VIII or IX concentrates   Clients of med list.                Acetaminophen-Codeine 300-30 MG Tabs   Take 1 tablet by mouth every 4 (four) hours as needed for Pain. Commonly known as:  TYLENOL WITH CODEINE #3           aspirin 81 MG Chew   Chew 81 mg by mouth daily.            CALCIUM 1200 O [employer] [address] [place], [place] [address] for making healthy food choices  -   Enjoy your food, but eat less. Fully enjoy your food when eating. Don’t eat while distracted and slow down. Avoid over sized portions. Don’t eat while when you’re bored.

## (undated) NOTE — LETTER
Date: 8/10/2022    Patient Name: Najma Lopez  YOB: 1930         To Whom it may concern:    Najma Lopez is under my care. Due to physical limitations, she is unable to physically get to her mailbox due to orthopedic and neurolgical deficits. Please directly deliver mail to her.     Thank you,       Katie Garcia MD, 8/10/2022, 9:44 AM